# Patient Record
Sex: MALE | Race: WHITE | NOT HISPANIC OR LATINO | Employment: OTHER | URBAN - METROPOLITAN AREA
[De-identification: names, ages, dates, MRNs, and addresses within clinical notes are randomized per-mention and may not be internally consistent; named-entity substitution may affect disease eponyms.]

---

## 2017-01-04 DIAGNOSIS — E78.5 HYPERLIPIDEMIA: ICD-10-CM

## 2017-01-05 RX ORDER — ATORVASTATIN CALCIUM 10 MG/1
TABLET, FILM COATED ORAL
Qty: 90 TABLET | Refills: 0 | Status: SHIPPED | OUTPATIENT
Start: 2017-01-05 | End: 2017-04-02 | Stop reason: SDUPTHER

## 2017-01-19 ENCOUNTER — LAB VISIT (OUTPATIENT)
Dept: LAB | Facility: HOSPITAL | Age: 62
End: 2017-01-19
Attending: FAMILY MEDICINE
Payer: COMMERCIAL

## 2017-01-19 DIAGNOSIS — Z51.81 ENCOUNTER FOR MONITORING STATIN THERAPY: ICD-10-CM

## 2017-01-19 DIAGNOSIS — E78.5 HYPERLIPIDEMIA: ICD-10-CM

## 2017-01-19 DIAGNOSIS — Z79.899 ENCOUNTER FOR MONITORING STATIN THERAPY: ICD-10-CM

## 2017-01-19 LAB
ALBUMIN SERPL BCP-MCNC: 4 G/DL
ALP SERPL-CCNC: 72 U/L
ALT SERPL W/O P-5'-P-CCNC: 31 U/L
ANION GAP SERPL CALC-SCNC: 12 MMOL/L
AST SERPL-CCNC: 24 U/L
BILIRUB SERPL-MCNC: 0.5 MG/DL
BUN SERPL-MCNC: 15 MG/DL
CALCIUM SERPL-MCNC: 10.3 MG/DL
CHLORIDE SERPL-SCNC: 99 MMOL/L
CHOLEST/HDLC SERPL: 2.4 {RATIO}
CO2 SERPL-SCNC: 25 MMOL/L
CREAT SERPL-MCNC: 0.9 MG/DL
EST. GFR  (AFRICAN AMERICAN): >60 ML/MIN/1.73 M^2
EST. GFR  (NON AFRICAN AMERICAN): >60 ML/MIN/1.73 M^2
GLUCOSE SERPL-MCNC: 95 MG/DL
HDL/CHOLESTEROL RATIO: 41 %
HDLC SERPL-MCNC: 183 MG/DL
HDLC SERPL-MCNC: 75 MG/DL
LDLC SERPL CALC-MCNC: 89.4 MG/DL
NONHDLC SERPL-MCNC: 108 MG/DL
POTASSIUM SERPL-SCNC: 4.4 MMOL/L
PROT SERPL-MCNC: 7.3 G/DL
SODIUM SERPL-SCNC: 136 MMOL/L
TRIGL SERPL-MCNC: 93 MG/DL

## 2017-01-19 PROCEDURE — 80053 COMPREHEN METABOLIC PANEL: CPT

## 2017-01-19 PROCEDURE — 80061 LIPID PANEL: CPT

## 2017-01-19 PROCEDURE — 36415 COLL VENOUS BLD VENIPUNCTURE: CPT | Mod: PO

## 2017-03-21 ENCOUNTER — TELEPHONE (OUTPATIENT)
Dept: FAMILY MEDICINE | Facility: CLINIC | Age: 62
End: 2017-03-21

## 2017-03-21 NOTE — TELEPHONE ENCOUNTER
That statement was made to me by him and I can't change it.  However, if they look at the details in the 5/16/14 note they will see the date he stated that [place] recorded.

## 2017-03-21 NOTE — TELEPHONE ENCOUNTER
Patient quit smoking 5/8/2014 at 4:30 am. Life insurance denied due to statement in ov notes from 6/29/15. Patient wants this changed and taken out of his record or record stating when he quit.

## 2017-03-22 NOTE — TELEPHONE ENCOUNTER
Patient notified documentation found in chart stating what day he stopped smoking-mailed copy to him.

## 2017-04-02 DIAGNOSIS — E78.5 HYPERLIPIDEMIA: ICD-10-CM

## 2017-04-02 RX ORDER — ATORVASTATIN CALCIUM 10 MG/1
TABLET, FILM COATED ORAL
Qty: 90 TABLET | Refills: 2 | Status: SHIPPED | OUTPATIENT
Start: 2017-04-02 | End: 2017-12-26 | Stop reason: SDUPTHER

## 2017-04-03 DIAGNOSIS — E78.5 HYPERLIPIDEMIA: ICD-10-CM

## 2017-04-03 RX ORDER — ATORVASTATIN CALCIUM 10 MG/1
TABLET, FILM COATED ORAL
Qty: 90 TABLET | Refills: 0 | OUTPATIENT
Start: 2017-04-03

## 2017-04-11 DIAGNOSIS — E78.5 HYPERLIPIDEMIA: ICD-10-CM

## 2017-04-11 RX ORDER — ATORVASTATIN CALCIUM 10 MG/1
TABLET, FILM COATED ORAL
Qty: 90 TABLET | Refills: 2 | OUTPATIENT
Start: 2017-04-11

## 2017-05-02 ENCOUNTER — OFFICE VISIT (OUTPATIENT)
Dept: DERMATOLOGY | Facility: CLINIC | Age: 62
End: 2017-05-02
Payer: COMMERCIAL

## 2017-05-02 VITALS — WEIGHT: 270 LBS | HEIGHT: 69 IN | BODY MASS INDEX: 39.99 KG/M2

## 2017-05-02 DIAGNOSIS — L57.0 ACTINIC KERATOSES: ICD-10-CM

## 2017-05-02 DIAGNOSIS — L82.0 INFLAMED SEBORRHEIC KERATOSIS: ICD-10-CM

## 2017-05-02 DIAGNOSIS — D22.9 MULTIPLE BENIGN NEVI: ICD-10-CM

## 2017-05-02 DIAGNOSIS — L82.1 SEBORRHEIC KERATOSES: ICD-10-CM

## 2017-05-02 DIAGNOSIS — L81.4 SOLAR LENTIGO: ICD-10-CM

## 2017-05-02 DIAGNOSIS — Z85.820 HISTORY OF MELANOMA: Primary | ICD-10-CM

## 2017-05-02 PROCEDURE — 1160F RVW MEDS BY RX/DR IN RCRD: CPT | Mod: S$GLB,,, | Performed by: DERMATOLOGY

## 2017-05-02 PROCEDURE — 17000 DESTRUCT PREMALG LESION: CPT | Mod: 59,S$GLB,, | Performed by: DERMATOLOGY

## 2017-05-02 PROCEDURE — 99214 OFFICE O/P EST MOD 30 MIN: CPT | Mod: 25,S$GLB,, | Performed by: DERMATOLOGY

## 2017-05-02 PROCEDURE — 99999 PR PBB SHADOW E&M-EST. PATIENT-LVL II: CPT | Mod: PBBFAC,,, | Performed by: DERMATOLOGY

## 2017-05-02 PROCEDURE — 17003 DESTRUCT PREMALG LES 2-14: CPT | Mod: S$GLB,,, | Performed by: DERMATOLOGY

## 2017-05-02 PROCEDURE — 11307 SHAVE SKIN LESION 1.1-2.0 CM: CPT | Mod: S$GLB,,, | Performed by: DERMATOLOGY

## 2017-05-02 NOTE — PATIENT INSTRUCTIONS
Shave Wound Care    Your doctor has performed a shave today.  A band aid and vaseline ointment has been placed over the site.  This should remain in place for 24 hours.  It is recommended that you keep the area dry for the first 24 hours.  After 24 hours, you may remove the band aid and wash the area with warm soap and water and apply Vaseline jelly.  Many patients prefer to use Neosporin or Bacitracin ointment.  This is acceptable; however, know that you can develop an allergy to this medication even if you have used it safely for years.  It is important to keep the area moist.  Letting it dry out and get air slows healing time, and will worsen the scar.  Band aid is optional after first 24 hours.      If you notice increasing redness, tenderness, pain, or yellow drainage at the biopsy site, please notify your doctor.  These are signs of an infection.    If your biopsy site is bleeding, apply firm pressure for 15 minutes straight.  Repeat for another 15 minutes, if it is still bleeding.   If the surgical site continues to bleed, then please contact your doctor.       [facility]  [facility]  [address]  [place] [address]  Dept: [phone]     CRYOSURGERY      Your doctor has used a method called cryosurgery to treat your skin condition. Cryosurgery refers to the use of very cold substances to treat a variety of skin conditions such as warts, pre-skin cancers, molluscum contagiosum, sun spots, and several benign growths. The substance we use in cryosurgery is liquid nitrogen and is so cold (-195 degrees Celsius) that is burns when administered.     Following treatment in the office, the skin may immediately burn and become red. You may find the area around the lesion is affected as well. It is sometimes necessary to treat not only the lesion, but a small area of the surrounding normal skin to achieve a good response.     A blister, and even a blood filled blister, may form after  treatment.   This is a normal response. If the blister is painful, it is acceptable to sterilize a needle and with rubbing alcohol and gently pop the blister. It is important that you gently wash the area with soap and warm water as the blister fluid may contain wart virus if a wart was treated. Do no remove the roof of the blister.     The area treated can take anywhere from 1-3 weeks to heal. Healing time depends on the kind skin lesion treated, the location, and how aggressively the lesion was treated. It is recommended that the areas treated are covered with Vaseline or bacitracin ointment and a band-aid. If a band-aid is not practical, just ointment applied several times per day will do. Keeping these areas moist will speed the healing time.    Treatment with liquid nitrogen can leave a scar. In dark skin, it may be a light or dark scar, in light skin it may be a white or pink scar. These will generally fade with time, but may never go away completely.     If you have any concerns after your treatment, please feel free to call the office.         [facility]  [facility]  [address] [address]  Dept: [phone]

## 2017-05-02 NOTE — PROGRESS NOTES
Subjective:       Patient ID:  Mal Larkin is a 61 y.o. male who presents for   Chief Complaint   Patient presents with    Skin Check     TBSE    Spot     scalp     HPI Comments: Initial visit  Previously under the care of Dr Novoa, last seen 12/2015  H/o MIS upper back 2011  This is a high risk patient here to check for the development of new lesions.        Spot  - Initial  Affected locations: scalp  Duration: 3 years  Signs / symptoms: irritated  Severity: mild  Timing: constant  Aggravated by: pressure  Relieving factors/Treatments tried: nothing        Review of Systems   Constitutional: Negative for fever, chills, weight loss, weight gain, fatigue, night sweats and malaise.   Skin: Positive for wears hat. Negative for daily sunscreen use, activity-related sunscreen use and recent sunburn.   Hematologic/Lymphatic: Bruises/bleeds easily.        Objective:    Physical Exam   Constitutional: He appears well-developed and well-nourished. He is obese.  No distress.   Neurological: He is alert and oriented to person, place, and time. He is not disoriented.   Psychiatric: He has a normal mood and affect.   Skin:   Areas Examined (abnormalities noted in diagram):   Scalp / Hair Palpated and Inspected  Head / Face Inspection Performed  Neck Inspection Performed  Chest / Axilla Inspection Performed  Abdomen Inspection Performed  Genitals / Buttocks / Groin Inspection Performed  Back Inspection Performed  RUE Inspected  LUE Inspection Performed  RLE Inspected  LLE Inspection Performed  Nails and Digits Inspection Performed                       Diagram Legend     Erythematous scaling macule/papule c/w actinic keratosis       Vascular papule c/w angioma      Pigmented verrucoid papule/plaque c/w seborrheic keratosis      Yellow umbilicated papule c/w sebaceous hyperplasia      Irregularly shaped tan macule c/w lentigo     1-2 mm smooth white papules consistent with Milia      Movable subcutaneous cyst with  punctum c/w epidermal inclusion cyst      Subcutaneous movable cyst c/w pilar cyst      Firm pink to brown papule c/w dermatofibroma      Pedunculated fleshy papule(s) c/w skin tag(s)      Evenly pigmented macule c/w junctional nevus     Mildly variegated pigmented, slightly irregular-bordered macule c/w mildly atypical nevus      Flesh colored to evenly pigmented papule c/w intradermal nevus       Pink pearly papule/plaque c/w basal cell carcinoma      Erythematous hyperkeratotic cursted plaque c/w SCC      Surgical scar with no sign of skin cancer recurrence      Open and closed comedones      Inflammatory papules and pustules      Verrucoid papule consistent consistent with wart     Erythematous eczematous patches and plaques     Dystrophic onycholytic nail with subungual debris c/w onychomycosis     Umbilicated papule    Erythematous-base heme-crusted tan verrucoid plaque consistent with inflamed seborrheic keratosis     Erythematous Silvery Scaling Plaque c/w Psoriasis     See annotation      Assessment / Plan:        History of melanoma  Area of previous melanoma (upper back) examined. Site well healed with no signs of recurrence.  Total body skin examination performed today including at least 12 points as noted in physical examination. No lesions suspicious for malignancy noted.    Inflamed seborrheic keratosis, R ant scalp, irritated injured by hard hat, requests treatment  Verbal consent obtained. 1 lesions (1.5 x 1cm) removed with shallow shave removal after anesthesia with 1% lidocaine with epinephrine. Hemostasis achieved with aluminum chloride and hyfrecation. No complications.    Seborrheic keratoses  These are benign inherited growths without a malignant potential. Reassurance given to patient. No treatment is necessary.     Actinic keratoses  Cryosurgery Procedure Note    Verbal consent from the patient is obtained and the patient is aware of the precancerous quality and need for treatment of these  lesions. Liquid nitrogen cryosurgery is applied to the 6 actinic keratoses, as detailed in the physical exam, to produce a freeze injury. The patient is aware that blisters may form and is instructed on wound care with gentle cleansing and use of vaseline ointment to keep moist until healed. The patient is supplied a handout on cryosurgery and is instructed to call if lesions do not completely resolve.    Solar lentigo  This is a benign hyperpigmented sun induced lesion. Daily sun protection will reduce the number of new lesions. Treatment of these benign lesions are considered cosmetic.    Multiple benign nevi  Discussed ABCDE's of nevi.  Monitor for new mole or moles that are becoming bigger, darker, irritated, or developing irregular borders. Brochure provided.    Patient instructed in importance in daily sun protection of at least spf 30. Sun avoidance and topical protection discussed.   Recommend Elta MD (physician office) COTZ sensitive (available online) for daily use on face and neck.  Patient encouraged to wear hat for all outdoor exposure.   Also discussed sun protective clothing.           Return in about 1 year (around 5/2/2018).

## 2017-06-29 ENCOUNTER — OFFICE VISIT (OUTPATIENT)
Dept: FAMILY MEDICINE | Facility: CLINIC | Age: 62
End: 2017-06-29
Payer: COMMERCIAL

## 2017-06-29 ENCOUNTER — TELEPHONE (OUTPATIENT)
Dept: FAMILY MEDICINE | Facility: CLINIC | Age: 62
End: 2017-06-29

## 2017-06-29 ENCOUNTER — DOCUMENTATION ONLY (OUTPATIENT)
Dept: FAMILY MEDICINE | Facility: CLINIC | Age: 62
End: 2017-06-29

## 2017-06-29 ENCOUNTER — HOSPITAL ENCOUNTER (OUTPATIENT)
Dept: RADIOLOGY | Facility: CLINIC | Age: 62
Discharge: HOME | End: 2017-06-29
Attending: FAMILY MEDICINE
Payer: COMMERCIAL

## 2017-06-29 VITALS
HEART RATE: 102 BPM | BODY MASS INDEX: 39.74 KG/M2 | TEMPERATURE: 99 F | WEIGHT: 268.31 LBS | HEIGHT: 69 IN | SYSTOLIC BLOOD PRESSURE: 135 MMHG | DIASTOLIC BLOOD PRESSURE: 82 MMHG

## 2017-06-29 DIAGNOSIS — M47.26 OSTEOARTHRITIS OF SPINE WITH RADICULOPATHY, LUMBAR REGION: ICD-10-CM

## 2017-06-29 DIAGNOSIS — M47.26 OSTEOARTHRITIS OF SPINE WITH RADICULOPATHY, LUMBAR REGION: Primary | ICD-10-CM

## 2017-06-29 PROCEDURE — 99213 OFFICE O/P EST LOW 20 MIN: CPT | Mod: S$GLB,,, | Performed by: PHYSICIAN ASSISTANT

## 2017-06-29 PROCEDURE — 99999 PR PBB SHADOW E&M-EST. PATIENT-LVL III: CPT | Mod: PBBFAC,,, | Performed by: PHYSICIAN ASSISTANT

## 2017-06-29 PROCEDURE — 72110 X-RAY EXAM L-2 SPINE 4/>VWS: CPT | Mod: TC,PO

## 2017-06-29 PROCEDURE — 72110 X-RAY EXAM L-2 SPINE 4/>VWS: CPT | Mod: 26,,, | Performed by: RADIOLOGY

## 2017-06-29 NOTE — PROGRESS NOTES
Pre-Visit Chart Review  For Appointment Scheduled on 06/29/2017      There are no preventive care reminders to display for this patient.

## 2017-06-29 NOTE — PROGRESS NOTES
Subjective:       Patient ID: Mal Larkin is a 61 y.o. male.    Chief Complaint: Back Pain    RESULTS:  Lumbar spine: AP, lateral  views, with coned down lateral view of the lumbosacral junction.     Comparison: 10/10/11.  MRI 06/29/11     Findings:     Again demonstrated is grade II anterolisthesis of L5 on S1, by 2 cm, on the basis of spondylolysis.  The degree of anterolisthesis is similar to that seen on 2011 exam.  There is advanced disk degenerative disease at this level with marked loss of   intervertebral disk space height and endplate sclerosis.  Mild to moderate degenerative changes elsewhere in the upper lumbar region is noted with spurring of the vertebral bodies and mild decrease in intervertebral disk space height.   Slight chronic   anterior wedging of T12.  No acute fracture.  Minor left convex curvature of the lumbar spine is noted as well as post operative findings from posterior decompression L4 to L5.  Sacroiliac joints are intact.  IMPRESSION:      Grade II anterolisthesis of L5 on S1 due to spondylolysis with mild to moderate lumbar spondylosis elsewhere and post operative findings from posterior lower lumbar decompression.           Back Pain   This is a chronic problem. The current episode started more than 1 year ago. The problem occurs constantly. The problem has been gradually worsening (patient in getting to the pain where it is difficult to walk, and is unable to work or pass a physical. ) since onset. The pain is present in the sacro-iliac and lumbar spine. The quality of the pain is described as aching and shooting. The pain is at a severity of 10/10. The pain is severe. The pain is the same all the time. The symptoms are aggravated by bending, lying down, standing and sitting. Associated symptoms include leg pain, numbness, paresthesias, pelvic pain and weakness. Pertinent negatives include no abdominal pain, bladder incontinence, bowel incontinence, chest pain, dysuria, fever,  headaches, perianal numbness, tingling or weight loss. Risk factors include lack of exercise and obesity (chronic severe back issues. ). He has tried NSAIDs for the symptoms. The treatment provided no relief.     Review of Systems   Constitutional: Negative for activity change, appetite change, chills, fatigue, fever, unexpected weight change and weight loss.   HENT: Negative.    Eyes: Negative for photophobia, pain, discharge, redness and visual disturbance.   Respiratory: Negative for cough, chest tightness and shortness of breath.    Cardiovascular: Negative for chest pain, palpitations and leg swelling.   Gastrointestinal: Negative for abdominal distention, abdominal pain, blood in stool, bowel incontinence, constipation, diarrhea, nausea and vomiting.   Genitourinary: Positive for pelvic pain. Negative for bladder incontinence, decreased urine volume, difficulty urinating, dysuria, frequency, hematuria and urgency.   Musculoskeletal: Positive for arthralgias, back pain, gait problem and myalgias.   Neurological: Positive for weakness, numbness and paresthesias. Negative for dizziness, tingling, light-headedness and headaches.       Objective:      Physical Exam   Constitutional: He is oriented to person, place, and time. He appears well-developed and well-nourished. No distress.   HENT:   Head: Normocephalic and atraumatic.   Right Ear: External ear normal.   Left Ear: External ear normal.   Mouth/Throat: No oropharyngeal exudate.   Eyes: Conjunctivae and EOM are normal. Pupils are equal, round, and reactive to light. Right eye exhibits no discharge. Left eye exhibits no discharge.   Neck: Normal range of motion. Neck supple. No thyromegaly present.   Cardiovascular: Normal rate, regular rhythm and normal heart sounds.  Exam reveals no gallop and no friction rub.    No murmur heard.  Pulmonary/Chest: Effort normal and breath sounds normal. No respiratory distress. He has no wheezes. He has no rales. He exhibits  no tenderness.   Abdominal: Soft. Bowel sounds are normal. He exhibits no distension and no mass. There is no tenderness. There is no guarding.   Musculoskeletal:        Lumbar back: He exhibits decreased range of motion, tenderness, bony tenderness and pain. He exhibits no swelling, no edema, no deformity, no laceration and no spasm.   Lymphadenopathy:     He has no cervical adenopathy.   Neurological: He is alert and oriented to person, place, and time.   Skin: Skin is warm and dry. He is not diaphoretic.   Psychiatric: He has a normal mood and affect. His behavior is normal. Judgment and thought content normal.       Assessment:       1. Osteoarthritis of spine with radiculopathy, lumbar region        Plan:       Mal was seen today for back pain.    Diagnoses and all orders for this visit:    Osteoarthritis of spine with radiculopathy, lumbar region  -     X-Ray Lumbar Spine Complete 5 View; Future    Will refer to pain management or neurosurgery

## 2017-06-30 ENCOUNTER — DOCUMENTATION ONLY (OUTPATIENT)
Dept: FAMILY MEDICINE | Facility: CLINIC | Age: 62
End: 2017-06-30

## 2017-06-30 ENCOUNTER — TELEPHONE (OUTPATIENT)
Dept: FAMILY MEDICINE | Facility: CLINIC | Age: 62
End: 2017-06-30

## 2017-06-30 DIAGNOSIS — M47.26 OSTEOARTHRITIS OF SPINE WITH RADICULOPATHY, LUMBAR REGION: Primary | ICD-10-CM

## 2017-06-30 DIAGNOSIS — G89.29 CHRONIC MIDLINE LOW BACK PAIN WITH BILATERAL SCIATICA: Primary | ICD-10-CM

## 2017-06-30 DIAGNOSIS — M54.42 CHRONIC MIDLINE LOW BACK PAIN WITH BILATERAL SCIATICA: Primary | ICD-10-CM

## 2017-06-30 DIAGNOSIS — M54.41 CHRONIC MIDLINE LOW BACK PAIN WITH BILATERAL SCIATICA: Primary | ICD-10-CM

## 2017-06-30 NOTE — TELEPHONE ENCOUNTER
----- Message from JEANNIE Causey sent at 6/29/2017  2:47 PM CDT -----  Back xray unchanged from 2013, lets refer to Dr. Tamayo

## 2017-06-30 NOTE — PROGRESS NOTES
Pre-Visit Chart Review  For Appointment Scheduled on 8-3-17    There are no preventive care reminders to display for this patient.

## 2017-06-30 NOTE — TELEPHONE ENCOUNTER
Patient notified of xray result and has been scheduled to see Dr. Tamayo on 7/17/17. Please review pended referral and will link to appointment.

## 2017-07-17 ENCOUNTER — OFFICE VISIT (OUTPATIENT)
Dept: PAIN MEDICINE | Facility: CLINIC | Age: 62
End: 2017-07-17
Payer: COMMERCIAL

## 2017-07-17 VITALS
BODY MASS INDEX: 39.69 KG/M2 | SYSTOLIC BLOOD PRESSURE: 150 MMHG | HEIGHT: 69 IN | WEIGHT: 268 LBS | HEART RATE: 69 BPM | DIASTOLIC BLOOD PRESSURE: 94 MMHG

## 2017-07-17 DIAGNOSIS — M47.819 FACET ARTHROPATHY: ICD-10-CM

## 2017-07-17 DIAGNOSIS — M51.36 DDD (DEGENERATIVE DISC DISEASE), LUMBAR: Primary | ICD-10-CM

## 2017-07-17 DIAGNOSIS — M96.1 POSTLAMINECTOMY SYNDROME OF LUMBAR REGION: ICD-10-CM

## 2017-07-17 DIAGNOSIS — M43.10 SPONDYLOLISTHESIS, UNSPECIFIED SPINAL REGION: ICD-10-CM

## 2017-07-17 PROCEDURE — 99244 OFF/OP CNSLTJ NEW/EST MOD 40: CPT | Mod: S$GLB,,, | Performed by: ANESTHESIOLOGY

## 2017-07-17 PROCEDURE — 99999 PR PBB SHADOW E&M-EST. PATIENT-LVL II: CPT | Mod: PBBFAC,,, | Performed by: ANESTHESIOLOGY

## 2017-07-17 NOTE — PROGRESS NOTES
This note was completed with dictation software and grammatical errors may exist.    Referring Physician: Renee Vazquez PA    PCP: Tito Marino MD      CC: back and bilateral leg pain    HPI:   Mal Larkin is a 61 y.o. male referred to us for lower back and bilateral leg pain.  Pain has been present for over 30 years. No recent traumatic incident. He has history of 2 prior lumbar spinesurgeries.  Most recently, he had a L5 laminectomy by Dr. Bryant.  He is known history with a stable grade 2 spondylolisthesis at L5 on S1.he continues have constant aching, burning, deep pain in his lower back.  Lower back pain is more bothersome.  He has occasional sharp shooting pain going down his bilateral legs into his bilateral toes. He has numbness and tingling in his toes. Pain worsens with standing, bending, walking, getting up.  Pain improves with sitting down.  His tried physical therapy with minimal benefit. He has not any lumbar spine injections. E denies any weakness.  No bowel bladder changes.  He rates his pain 6/10 today, 10/10 at worse.    ROS:  CONSTITUTIONAL: No fevers, chills, night sweats, wt. loss, appetite changes  SKIN: no rashes or itching  ENT: No headaches, head trauma, vision changes, or eye pain  LYMPH NODES: None noticed   CV: No chest pain, palpitations.   RESP: No shortness of breath, dyspnea on exertion, cough, wheezing, or hemoptysis  GI: No nausea, emesis, diarrhea, constipation, melena, hematochezia, pain.    : No dysuria, hematuria, urgency, or frequency   HEME: No easy bruising, bleeding problems  PSYCHIATRIC: No depression, anxiety, psychosis, hallucinations.  NEURO: No seizures, memory loss, dizziness or difficulty sleeping  MSK: + history of present illness      Past Medical History:   Diagnosis Date    Adenomatous colon polyp     + dysplasia    Colon polyp     Fatty liver     Hypertension     Melanoma 2011    in situ - upper back     Past Surgical History:   Procedure  Laterality Date    APPENDECTOMY      BACK SURGERY      transverse process fracture    COLONOSCOPY  11/11/2013    Dr. Hayes, 3 year recheck    COLONOSCOPY N/A 12/20/2016    Procedure: COLONOSCOPY;  Surgeon: Atilio Hayes MD;  Location[facility];  Service: Endoscopy;  Laterality: N/A;    HAND SURGERY      right dupuytrens release on 8/5/13    LUMBAR LAMINECTOMY  2011    melanoma removal  2011    WISDOM TOOTH EXTRACTION       Family History   Problem Relation Age of Onset    Skin cancer Mother     Ovarian cancer Mother     Heart disease Mother     Prostate cancer Father     Colon cancer Father     Cancer Father      glands    Melanoma Father     Melanoma Sister     Asthma Daughter     Fibromyalgia Daughter     Heart disease Maternal Uncle     Alcohol abuse Maternal Uncle     Cancer Paternal Aunt     Cerebral aneurysm Maternal Grandmother     Early death Maternal Grandmother      fall hit head    Heart disease Maternal Grandfather     Cancer Paternal Grandmother     Pancreatic cancer Paternal Grandmother     Cancer Sister      cervix, abd     Melanoma Sister     Skin cancer Sister     Vaginal cancer Sister     Macular degeneration Neg Hx     Retinal detachment Neg Hx     Glaucoma Neg Hx     Psoriasis Neg Hx     Lupus Neg Hx     Eczema Neg Hx      Social History     Social History    Marital status:      Spouse name: N/A    Number of children: N/A    Years of education: N/A     Occupational History     [employer]     Social History Main Topics    Smoking status: Former Smoker     Packs/day: 1.00     Years: 30.00     Types: Cigarettes     Quit date: 5/8/2014    Smokeless tobacco: Former User     Types: Chew     Quit date: 9/22/1979    Alcohol use 6.0 oz/week     12 Standard drinks or equivalent per week      Comment: Drinks beer only and mostly during football season    Drug use: No    Sexual activity: Not Asked     Other Topics Concern    None  "    Social History Narrative    None         Medications/Allergies: See med card    Vitals:    07/17/17 0801   BP: (!) 150/94   Pulse: 69   Weight: 121.6 kg (268 lb)   Height: 5' 9" (1.753 m)   PainSc:   8   PainLoc: Back         Physical exam:    GENERAL: A and O x3, the patient appears well groomed and is in no acute distress.  Skin: No rashes or obvious lesions  HEENT: normocephalic, atraumatic  CARDIOVASCULAR:  Palpable peripheral pulses  LUNGS: easy work of breathing  ABDOMEN: soft, nontender   UPPER EXTREMITIES: Normal alignment, normal range of motion, no atrophy, no skin changes,  hair growth and nail growth normal and equal bilaterally. No swelling, no tenderness.    LOWER EXTREMITIES:  Normal alignment, normal range of motion, no atrophy, no skin changes,  hair growth and nail growth normal and equal bilaterally. No swelling, no tenderness.    LUMBAR SPINE  Lumbar spine: ROM is limited with flexion extension and oblique extension with moderate increased pain.    Micheal's test causes no increased pain on either side.    Supine straight leg raise is positive on right at 60 degrees   Internal and external rotation of the hip causes no increased pain on either side.  Myofascial exam: No tenderness to palpation across lumbar paraspinous muscles.      MENTAL STATUS: normal orientation, speech, language, and fund of knowledge for social situation.  Emotional state appropriate.    CRANIAL NERVES:  II:  PERRL bilaterally,   III,IV,VI: EOMI.    V:  Facial sensation equal bilaterally  VII:  Facial motor function normal.  VIII:  Hearing equal to finger rub bilaterally  IX/X: Gag normal, palate symmetric  XI:  Shoulder shrug equal, head turn equal  XII:  Tongue midline without fasciculations      MOTOR: Tone and bulk: normal bilateral upper and lower Strength: normal   Delt Bi Tri WE WF     R 5 5 5 5 5 5   L 5 5 5 5 5 5     IP ADD ABD Quad TA Gas HAM  R 5 5 5 5 5 5 5  L 5 5 5 5 5 5 5    SENSATION: Light touch and " pinprick intact bilaterally  REFLEXES: normal, symmetric, nonbrisk.  Toes down, no clonus. No hoffmans.  GAIT: normal rise, base, steps, and arm swing.        Imaging:  MRI L-spine 6/2011  IMPRESSION:   1.  DEGENERATION AND MILD CENTRAL EXTRUSION OF THE T12-L1 DISC.  2.  SEVERE SPINAL STENOSIS AT THE L1-2 LEVEL FROM A COMBINATION OF DISC   EXTRUSION AND DEGENERATIVE FACET ARTHROSIS.    3.  SEVERE SPINAL STENOSIS AT THE L2-3 LEVEL FROM A COMBINATION OF DISC   BULGE AND DEGENERATIVE FACET ARTHROSIS.    4.  MILD SPINAL STENOSIS AT THE L3-4 LEVEL FROM A COMBINATION OF DISC   BULGE AND DEGENERATIVE FACET ARTHROSIS.  5.  MINIMAL L4-5 DISC BULGE.  THERE IS MILD LEFT L4-5 FACET JOINT   DEGENERATIVE ARTHROSIS.    6.  BILATERAL L5 SPONDYLOLYSIS WITH GRADE II SPONDYLOLISTHESIS OF L5 ON   S1.  THERE IS SEVERE DEGENERATION OF THE L5-S1 DISC WITH MILD DISC BULGE   AND SEVERE BILATERAL FORAMINAL STENOSIS.  THERE ARE ALSO POSTOPERATIVE   CHANGES OF L5 LAMINECTOMY.      Xray L-spine 6/29/17  1.  Stable radiographic appearance of the lumbar spine demonstrating chronic grade 2 spondylolisthesis of L5 on S1 secondary to bilateral spondylolysis and additional multilevel spondylosis. Postoperative laminectomy changes are also noted at L4 and L5. Severe osseous neural foraminal narrowing at L5-S1 is also again noted.    Assessment:  Patient referred for lower back and bilateral leg pain  1. DDD (degenerative disc disease), lumbar    2. Postlaminectomy syndrome of lumbar region    3. Spondylolisthesis, unspecified spinal region    4. Facet arthropathy          Plan:  - I have stressed the importance of physical activity and exercise to improve overall health  - I believe his low back pain maybe due to facet arthropathy and have recommended lumbar medial branch blocks as a diagnostic procedure.  If successful, would proceed with radiofrequency ablation.  - May consider lumbar ELOY if radicular pain worsens  - Follow up after  procedure      Thank you for referring this interesting patient, and I look forward to continuing to collaborate in his care.

## 2017-07-24 DIAGNOSIS — M47.816 FACET ARTHROPATHY, LUMBAR: Primary | ICD-10-CM

## 2017-08-01 ENCOUNTER — PATIENT MESSAGE (OUTPATIENT)
Dept: ADMINISTRATIVE | Facility: OTHER | Age: 62
End: 2017-08-01

## 2017-08-03 ENCOUNTER — SURGERY (OUTPATIENT)
Age: 62
End: 2017-08-03

## 2017-08-03 ENCOUNTER — HOSPITAL ENCOUNTER (OUTPATIENT)
Facility: AMBULARY SURGERY CENTER | Age: 62
Discharge: HOME | End: 2017-08-03
Attending: ANESTHESIOLOGY | Admitting: ANESTHESIOLOGY
Payer: COMMERCIAL

## 2017-08-03 DIAGNOSIS — M47.819 FACET ARTHROPATHY: Primary | ICD-10-CM

## 2017-08-03 PROCEDURE — 64493 INJ PARAVERT F JNT L/S 1 LEV: CPT | Mod: LT | Performed by: ANESTHESIOLOGY

## 2017-08-03 PROCEDURE — 64495 INJ PARAVERT F JNT L/S 3 LEV: CPT | Mod: LT | Performed by: ANESTHESIOLOGY

## 2017-08-03 PROCEDURE — 64495 INJ PARAVERT F JNT L/S 3 LEV: CPT | Mod: 50,,, | Performed by: ANESTHESIOLOGY

## 2017-08-03 PROCEDURE — 64493 INJ PARAVERT F JNT L/S 1 LEV: CPT | Mod: 50,,, | Performed by: ANESTHESIOLOGY

## 2017-08-03 PROCEDURE — 64494 INJ PARAVERT F JNT L/S 2 LEV: CPT | Mod: RT | Performed by: ANESTHESIOLOGY

## 2017-08-03 PROCEDURE — 99152 MOD SED SAME PHYS/QHP 5/>YRS: CPT | Mod: ,,, | Performed by: ANESTHESIOLOGY

## 2017-08-03 PROCEDURE — 64494 INJ PARAVERT F JNT L/S 2 LEV: CPT | Mod: 50,,, | Performed by: ANESTHESIOLOGY

## 2017-08-03 RX ORDER — FENTANYL CITRATE 50 UG/ML
INJECTION, SOLUTION INTRAMUSCULAR; INTRAVENOUS
Status: DISCONTINUED
Start: 2017-08-03 | End: 2017-08-03 | Stop reason: WASHOUT

## 2017-08-03 RX ORDER — BUPIVACAINE HYDROCHLORIDE 5 MG/ML
INJECTION, SOLUTION EPIDURAL; INTRACAUDAL
Status: DISCONTINUED | OUTPATIENT
Start: 2017-08-03 | End: 2017-08-03 | Stop reason: HOSPADM

## 2017-08-03 RX ORDER — MIDAZOLAM HYDROCHLORIDE 1 MG/ML
INJECTION INTRAMUSCULAR; INTRAVENOUS
Status: DISCONTINUED
Start: 2017-08-03 | End: 2017-08-03 | Stop reason: HOSPADM

## 2017-08-03 RX ORDER — SODIUM CHLORIDE, SODIUM LACTATE, POTASSIUM CHLORIDE, CALCIUM CHLORIDE 600; 310; 30; 20 MG/100ML; MG/100ML; MG/100ML; MG/100ML
INJECTION, SOLUTION INTRAVENOUS ONCE AS NEEDED
Status: COMPLETED | OUTPATIENT
Start: 2017-08-03 | End: 2017-08-03

## 2017-08-03 RX ORDER — BUPIVACAINE HYDROCHLORIDE 5 MG/ML
INJECTION, SOLUTION EPIDURAL; INTRACAUDAL
Status: DISCONTINUED
Start: 2017-08-03 | End: 2017-08-03 | Stop reason: HOSPADM

## 2017-08-03 RX ORDER — MIDAZOLAM HYDROCHLORIDE 2 MG/2ML
INJECTION, SOLUTION INTRAMUSCULAR; INTRAVENOUS
Status: DISCONTINUED | OUTPATIENT
Start: 2017-08-03 | End: 2017-08-03 | Stop reason: HOSPADM

## 2017-08-03 RX ORDER — ALPRAZOLAM 1 MG/1
1 TABLET, ORALLY DISINTEGRATING ORAL
Status: DISCONTINUED | OUTPATIENT
Start: 2017-08-03 | End: 2017-08-03 | Stop reason: HOSPADM

## 2017-08-03 RX ADMIN — MIDAZOLAM HYDROCHLORIDE 2 MG: 2 INJECTION, SOLUTION INTRAMUSCULAR; INTRAVENOUS at 10:08

## 2017-08-03 RX ADMIN — BUPIVACAINE HYDROCHLORIDE 5 ML: 5 INJECTION, SOLUTION EPIDURAL; INTRACAUDAL at 10:08

## 2017-08-03 RX ADMIN — SODIUM CHLORIDE, SODIUM LACTATE, POTASSIUM CHLORIDE, CALCIUM CHLORIDE: 600; 310; 30; 20 INJECTION, SOLUTION INTRAVENOUS at 10:08

## 2017-08-03 NOTE — H&P (VIEW-ONLY)
This note was completed with dictation software and grammatical errors may exist.    Referring Physician: Renee Vazquez PA    PCP: Tito Marino MD      CC: back and bilateral leg pain    HPI:   Mal Larkin is a 61 y.o. male referred to us for lower back and bilateral leg pain.  Pain has been present for over 30 years. No recent traumatic incident. He has history of 2 prior lumbar spinesurgeries.  Most recently, he had a L5 laminectomy by Dr. Bryant.  He is known history with a stable grade 2 spondylolisthesis at L5 on S1.he continues have constant aching, burning, deep pain in his lower back.  Lower back pain is more bothersome.  He has occasional sharp shooting pain going down his bilateral legs into his bilateral toes. He has numbness and tingling in his toes. Pain worsens with standing, bending, walking, getting up.  Pain improves with sitting down.  His tried physical therapy with minimal benefit. He has not any lumbar spine injections. E denies any weakness.  No bowel bladder changes.  He rates his pain 6/10 today, 10/10 at worse.    ROS:  CONSTITUTIONAL: No fevers, chills, night sweats, wt. loss, appetite changes  SKIN: no rashes or itching  ENT: No headaches, head trauma, vision changes, or eye pain  LYMPH NODES: None noticed   CV: No chest pain, palpitations.   RESP: No shortness of breath, dyspnea on exertion, cough, wheezing, or hemoptysis  GI: No nausea, emesis, diarrhea, constipation, melena, hematochezia, pain.    : No dysuria, hematuria, urgency, or frequency   HEME: No easy bruising, bleeding problems  PSYCHIATRIC: No depression, anxiety, psychosis, hallucinations.  NEURO: No seizures, memory loss, dizziness or difficulty sleeping  MSK: + history of present illness      Past Medical History:   Diagnosis Date    Adenomatous colon polyp     + dysplasia    Colon polyp     Fatty liver     Hypertension     Melanoma 2011    in situ - upper back     Past Surgical History:   Procedure  Laterality Date    APPENDECTOMY      BACK SURGERY      transverse process fracture    COLONOSCOPY  11/11/2013    Dr. Hayes, 3 year recheck    COLONOSCOPY N/A 12/20/2016    Procedure: COLONOSCOPY;  Surgeon: Atilio Hayes MD;  Location: [facility];  Service: Endoscopy;  Laterality: N/A;    HAND SURGERY      right dupuytrens release on 8/5/13    LUMBAR LAMINECTOMY  2011    melanoma removal  2011    WISDOM TOOTH EXTRACTION       Family History   Problem Relation Age of Onset    Skin cancer Mother     Ovarian cancer Mother     Heart disease Mother     Prostate cancer Father     Colon cancer Father     Cancer Father      glands    Melanoma Father     Melanoma Sister     Asthma Daughter     Fibromyalgia Daughter     Heart disease Maternal Uncle     Alcohol abuse Maternal Uncle     Cancer Paternal Aunt     Cerebral aneurysm Maternal Grandmother     Early death Maternal Grandmother      fall hit head    Heart disease Maternal Grandfather     Cancer Paternal Grandmother     Pancreatic cancer Paternal Grandmother     Cancer Sister      cervix, abd     Melanoma Sister     Skin cancer Sister     Vaginal cancer Sister     Macular degeneration Neg Hx     Retinal detachment Neg Hx     Glaucoma Neg Hx     Psoriasis Neg Hx     Lupus Neg Hx     Eczema Neg Hx      Social History     Social History    Marital status:      Spouse name: N/A    Number of children: N/A    Years of education: N/A     Occupational History     [employer]     Social History Main Topics    Smoking status: Former Smoker     Packs/day: 1.00     Years: 30.00     Types: Cigarettes     Quit date: 5/8/2014    Smokeless tobacco: Former User     Types: Chew     Quit date: 9/22/1979    Alcohol use 6.0 oz/week     12 Standard drinks or equivalent per week      Comment: Drinks beer only and mostly during football season    Drug use: No    Sexual activity: Not Asked     Other Topics Concern    None  "    Social History Narrative    None         Medications/Allergies: See med card    Vitals:    07/17/17 0801   BP: (!) 150/94   Pulse: 69   Weight: 121.6 kg (268 lb)   Height: 5' 9" (1.753 m)   PainSc:   8   PainLoc: Back         Physical exam:    GENERAL: A and O x3, the patient appears well groomed and is in no acute distress.  Skin: No rashes or obvious lesions  HEENT: normocephalic, atraumatic  CARDIOVASCULAR:  Palpable peripheral pulses  LUNGS: easy work of breathing  ABDOMEN: soft, nontender   UPPER EXTREMITIES: Normal alignment, normal range of motion, no atrophy, no skin changes,  hair growth and nail growth normal and equal bilaterally. No swelling, no tenderness.    LOWER EXTREMITIES:  Normal alignment, normal range of motion, no atrophy, no skin changes,  hair growth and nail growth normal and equal bilaterally. No swelling, no tenderness.    LUMBAR SPINE  Lumbar spine: ROM is limited with flexion extension and oblique extension with moderate increased pain.    Micheal's test causes no increased pain on either side.    Supine straight leg raise is positive on right at 60 degrees   Internal and external rotation of the hip causes no increased pain on either side.  Myofascial exam: No tenderness to palpation across lumbar paraspinous muscles.      MENTAL STATUS: normal orientation, speech, language, and fund of knowledge for social situation.  Emotional state appropriate.    CRANIAL NERVES:  II:  PERRL bilaterally,   III,IV,VI: EOMI.    V:  Facial sensation equal bilaterally  VII:  Facial motor function normal.  VIII:  Hearing equal to finger rub bilaterally  IX/X: Gag normal, palate symmetric  XI:  Shoulder shrug equal, head turn equal  XII:  Tongue midline without fasciculations      MOTOR: Tone and bulk: normal bilateral upper and lower Strength: normal   Delt Bi Tri WE WF     R 5 5 5 5 5 5   L 5 5 5 5 5 5     IP ADD ABD Quad TA Gas HAM  R 5 5 5 5 5 5 5  L 5 5 5 5 5 5 5    SENSATION: Light touch and " pinprick intact bilaterally  REFLEXES: normal, symmetric, nonbrisk.  Toes down, no clonus. No hoffmans.  GAIT: normal rise, base, steps, and arm swing.        Imaging:  MRI L-spine 6/2011  IMPRESSION:   1.  DEGENERATION AND MILD CENTRAL EXTRUSION OF THE T12-L1 DISC.  2.  SEVERE SPINAL STENOSIS AT THE L1-2 LEVEL FROM A COMBINATION OF DISC   EXTRUSION AND DEGENERATIVE FACET ARTHROSIS.    3.  SEVERE SPINAL STENOSIS AT THE L2-3 LEVEL FROM A COMBINATION OF DISC   BULGE AND DEGENERATIVE FACET ARTHROSIS.    4.  MILD SPINAL STENOSIS AT THE L3-4 LEVEL FROM A COMBINATION OF DISC   BULGE AND DEGENERATIVE FACET ARTHROSIS.  5.  MINIMAL L4-5 DISC BULGE.  THERE IS MILD LEFT L4-5 FACET JOINT   DEGENERATIVE ARTHROSIS.    6.  BILATERAL L5 SPONDYLOLYSIS WITH GRADE II SPONDYLOLISTHESIS OF L5 ON   S1.  THERE IS SEVERE DEGENERATION OF THE L5-S1 DISC WITH MILD DISC BULGE   AND SEVERE BILATERAL FORAMINAL STENOSIS.  THERE ARE ALSO POSTOPERATIVE   CHANGES OF L5 LAMINECTOMY.      Xray L-spine 6/29/17  1.  Stable radiographic appearance of the lumbar spine demonstrating chronic grade 2 spondylolisthesis of L5 on S1 secondary to bilateral spondylolysis and additional multilevel spondylosis. Postoperative laminectomy changes are also noted at L4 and L5. Severe osseous neural foraminal narrowing at L5-S1 is also again noted.    Assessment:  Patient referred for lower back and bilateral leg pain  1. DDD (degenerative disc disease), lumbar    2. Postlaminectomy syndrome of lumbar region    3. Spondylolisthesis, unspecified spinal region    4. Facet arthropathy          Plan:  - I have stressed the importance of physical activity and exercise to improve overall health  - I believe his low back pain maybe due to facet arthropathy and have recommended lumbar medial branch blocks as a diagnostic procedure.  If successful, would proceed with radiofrequency ablation.  - May consider lumbar ELOY if radicular pain worsens  - Follow up after  procedure      Thank you for referring this interesting patient, and I look forward to continuing to collaborate in his care.

## 2017-08-03 NOTE — INTERVAL H&P NOTE
The patient has been examined and the H&P has been reviewed:    I concur with the findings and no changes have occurred since H&P was written.   This patient has been cleared for surgery in an ambulatory surgical facility    ASA 3,  MP3  No history of anesthetic complications  Plan for RN IV sedation      Anesthesia/Surgery risks, benefits and alternative options discussed and understood by patient/family.          There are no hospital problems to display for this patient.

## 2017-08-03 NOTE — DISCHARGE SUMMARY
[facility]  Discharge Note  Short Stay    Admit Date: 8/3/2017    Discharge Date and Time: 8/3/2017    Attending Physician: Javier Tamayo MD     Discharge Provider: Javier Tamayo    Diagnoses:  Active Hospital Problems    Diagnosis  POA    *Facet arthropathy [M12.88]  Yes      Resolved Hospital Problems    Diagnosis Date Resolved POA   No resolved problems to display.       Hospital Course: Lumbar MBB   Discharged Condition: Good    Final Diagnoses:   Active Hospital Problems    Diagnosis  POA    *Facet arthropathy [M12.88]  Yes      Resolved Hospital Problems    Diagnosis Date Resolved POA   No resolved problems to display.       Disposition: Home or Self Care    Follow up/Patient Instructions:    Medications:  Reconciled Home Medications:   Current Discharge Medication List      CONTINUE these medications which have NOT CHANGED    Details   amlodipine (NORVASC) 5 MG tablet TAKE 1 TABLET BY MOUTH ONCE DAILY  Qty: 90 tablet, Refills: 3    Associated Diagnoses: Essential hypertension      atorvastatin (LIPITOR) 10 MG tablet TAKE 1 TABLET BY MOUTH DAILY  Qty: 90 tablet, Refills: 2    Associated Diagnoses: Hyperlipidemia      lisinopril (PRINIVIL,ZESTRIL) 20 MG tablet TAKE 1 TABLET BY MOUTH DAILY  Qty: 90 tablet, Refills: 3    Associated Diagnoses: Essential hypertension             Discharge Procedure Orders  Diet general     Activity as tolerated     Call MD for:  temperature >100.4     Call MD for:  persistent nausea and vomiting or diarrhea     Call MD for:  severe uncontrolled pain     Call MD for:  redness, tenderness, or signs of infection (pain, swelling, redness, odor or green/yellow discharge around incision site)     Call MD for:  difficulty breathing or increased cough     Call MD for:  severe persistent headache          Follow up with MD in 2-3 weeks    Discharge Procedure Orders (must include Diet, Follow-up, Activity):    Discharge Procedure Orders (must include Diet, Follow-up, Activity)  Diet  general     Activity as tolerated     Call MD for:  temperature >100.4     Call MD for:  persistent nausea and vomiting or diarrhea     Call MD for:  severe uncontrolled pain     Call MD for:  redness, tenderness, or signs of infection (pain, swelling, redness, odor or green/yellow discharge around incision site)     Call MD for:  difficulty breathing or increased cough     Call MD for:  severe persistent headache

## 2017-08-03 NOTE — OP NOTE
PROCEDURE DATE: 8/3/2017    PROCEDURE:  Bilateral L2,3,4,5 medial branch nerve blocks    DIAGNOSIS:  Lumbar spondylosis without myelopathy    Post Op diagnosis: Same    PHYSICIAN: Javier Tamayo MD    MEDICATIONS INJECTED: 0.5% bupivicaine, 0.5 ml at each level    LOCAL ANESTHETIC USED: None    SEDATION MEDICATIONS:IV Versed    ESTIMATED BLOOD LOSS:  None    COMPLICATIONS:  None    TECHNIQUE: A time out was taken to identify the patient, procedure and side of the procedure. The patient was placed in a prone position, then prepped and draped in the usual sterile fashion using ChloraPrep and sterile towels.  The levels were determined under fluoroscopic guidance and then marked.  A 25-gauge 3.5 inch needle was introduced to the anatomic location of the L2,3,4,5 medial branch nerves on the bilateral side. The above medication was then injected. The patient tolerated the procedure well.     The patient was monitored after the procedure. Patient was given pain diary to record pain levels at home.     If found to have greater than a 50% recovery and so will be scheduled for a radiofrequency ablation of the corresponding nerves.  Patient was given post procedure and discharge instructions to follow at home.  The patient was discharged in a stable condition.

## 2017-08-04 VITALS
DIASTOLIC BLOOD PRESSURE: 62 MMHG | HEIGHT: 69 IN | HEART RATE: 68 BPM | RESPIRATION RATE: 18 BRPM | SYSTOLIC BLOOD PRESSURE: 116 MMHG | WEIGHT: 268 LBS | BODY MASS INDEX: 39.69 KG/M2 | OXYGEN SATURATION: 92 % | TEMPERATURE: 98 F

## 2017-08-07 ENCOUNTER — TELEPHONE (OUTPATIENT)
Dept: PAIN MEDICINE | Facility: CLINIC | Age: 62
End: 2017-08-07

## 2017-08-07 NOTE — TELEPHONE ENCOUNTER
Patient reports 50% or greater decrease in pain. Will proceed with RF to corresponding nerves. Patient scheduled for 8/22/17. Instructions given patient voiced understanding.

## 2017-08-07 NOTE — TELEPHONE ENCOUNTER
----- Message from Abi Perez sent at 8/7/2017  1:31 PM CDT -----  Contact: self  Patient calling regarding the nerve burn appointment. Patient is needing to know it is schedule for. Please call patient at [phone]. Thanks!

## 2017-08-09 ENCOUNTER — PATIENT MESSAGE (OUTPATIENT)
Dept: ADMINISTRATIVE | Facility: OTHER | Age: 62
End: 2017-08-09

## 2017-08-15 DIAGNOSIS — M47.816 LUMBAR FACET ARTHROPATHY: Primary | ICD-10-CM

## 2017-08-17 ENCOUNTER — PATIENT OUTREACH (OUTPATIENT)
Dept: OTHER | Facility: OTHER | Age: 62
End: 2017-08-17

## 2017-08-17 NOTE — TELEPHONE ENCOUNTER
HTN Digital Medicine Program Medication Reconciliation Outreach    Called patient to introduce him into the HDMP. Reviewed program details including blood pressure goals, technique for taking readings (timing, cuff placement, body positioning, [facility] eugenio), and what to do in case of emergency. Introduced patient to members of care team (health , clinician, and responsible provider). Verified patient's understanding of [facility] eugenio use for contacting clinical team and to ensure that iHealth cuff readings continue to transmit by logging in once every 2 weeks. Confirmation text sent and patient received.  Pt has been taking readings at various times of day. States that he wakes up around 3am and takes his Norvasc at that time. Norvasc was decreased to 5mg d/t swelling in ankles and feet, but BP has been much higher since that change was made and would like to be back on 10mg daily. Is concerned about swelling and feels that he should be on a diuretic as well.  Initially had some issues with BP cuff and was notified that he was not placing the cuff on tight enough. Has not been having any issues since this was adjusted.  Pt has pain management issues and is set to undergo a nerve burn to help with symptoms. Not currently on any pain medications as outpatient.    Screening Questionnaire Review:  1. Depression - not indicated  2. Sleep apnea - yes     VIOLET screening results for this patient suggest a high likelihood of sleep apnea, which can contribute to hypertension. Patient has not been previously diagnosed with sleep apnea and is not interested in referral at this time.  Pt told by gastroenterologist that he was snoring heavily under anesthesia when he had colonoscopy done last. Knows that he needs to lose weight as this issue did not start until he gained weight following quitting smoking.  Will speak with PCP at his next appointment in September.    Patient reports the following symptoms of sleep apnea:  snoring.    Verified the following information with the patient:  1. Medication list  Current Outpatient Prescriptions on File Prior to Visit   Medication Sig Dispense Refill    amlodipine (NORVASC) 5 MG tablet TAKE 1 TABLET BY MOUTH ONCE DAILY 90 tablet 3    atorvastatin (LIPITOR) 10 MG tablet TAKE 1 TABLET BY MOUTH DAILY 90 tablet 2    lisinopril (PRINIVIL,ZESTRIL) 20 MG tablet TAKE 1 TABLET BY MOUTH DAILY 90 tablet 3     No current facility-administered medications on file prior to visit.        Previous ADRs  NKDA    2. Medication compliance: has been compliant with the medicaiton regimen    3. Medication Allergies: Review of patient's allergies indicates:  No Known Allergies    Explained that our goal is to get his BP consistently below 140/90mmHg.  Patient denies having further questions, concerns. BP is at goal.       Last 5 Patient Entered [employer] Current 30 Day Average: 135/85     Recent Readings 8/17/2017 8/17/2017 8/16/2017 8/16/2017 8/16/2017    Systolic BP (mmHg) 131 132 115 140 144    Diastolic BP (mmHg) 82 90 76 82 93    Pulse 65 66 68 70 70

## 2017-08-21 ENCOUNTER — PATIENT OUTREACH (OUTPATIENT)
Dept: OTHER | Facility: OTHER | Age: 62
End: 2017-08-21

## 2017-08-21 DIAGNOSIS — I10 ESSENTIAL HYPERTENSION: ICD-10-CM

## 2017-08-21 NOTE — LETTER
Sophia Ogden, Rayna  [address] [place] [address]     Dear Mal Larkin,    Welcome to the [facility]!         My name is Sophia Ogden PharmD and I am your dedicated Digital Medicine clinician.  As an expert in medication management, I will help ensure that the medications you are taking continue to provide you with the intended benefits.      I am Gregoria Lobato and I will be your health  for the duration of the program.  My  job is to help you identify lifestyle changes to improve your blood pressure control.  We will talk about nutrition, exercise, and other ways that you may be able to adjust your current habits to better your health. Together, we will work to improve your overall health and encourage you to meet your goals for a healthier lifestyle.    What we expect from YOU:    You will need to take blood pressure readings multiple times a week and no less than one reading per week.   It is important that you take your measurements at different times during the day, when possible.     What you should expect from your Digital Medicine Care Team:   We will provide you with education about high blood pressure, including lifestyle changes that could help you to control your blood pressure.   We will review your weekly readings and provide you with monthly blood pressure progress reports after you have been in the program for more than 30 days.   We will send monthly progress reports on your blood pressure control to your physician so they can follow along with your progress as well.    You will be able to reach me by phone at [phone] or through your [facility] account by clicking my name under Care Team on the right side of the home screen.    I look forward to working with you to achieve your blood pressure goals!    Sincerely,    Sophia Ogden PharmD  Your personal clinician    Please visit [email or web address]  to learn more about high blood pressure and what you can do lower your blood pressure.                                                                                           Mal Larkin  [address] [place] [address] [address] Carlos QUIROS [id number]

## 2017-08-21 NOTE — PROGRESS NOTES
"Last 5 Patient Entered [employer] Current 30 Day Average: 137/85     Recent Readings 8/22/2017 8/22/2017 8/21/2017 8/21/2017 8/21/2017    Systolic BP (mmHg) 146 152 130 109 132    Diastolic BP (mmHg) 90 92 79 77 92    Pulse 67 70 99 103 68        Called Mr. Larkin to introduce him into the Hypertension Digital Medicine Program.     Mr. Larkin informed his health  that he has ADAM from amlodipine. He says he started having swelling of his feet and ankles "the day he started taking amlodipine." He is asking about a diuretic to counteract the swelling. Explained that we have room to increase lisinopril to 40 mg QD and stop the amlodipine, instead of adding a new medication. He said that he asked Dr. Marino about this and he informed him that the higher dose of lisinopril will cause ED. Explained to Mr. Larkin that may not necessarily happen, but if we increase it and he does experience ED or any other side effect, we can resume the 20 mg dose and add another medication, other than amlodipine. He is willing to try the lisinopril 40 mg QD to see if this will control BP and stop swelling. He will inform me of any side effects that occur, otherwise I will check in with him in about 2 weeks.     Reviewed patient's medications and verified allergies on file.   Hypertension Medications                  lisinopril (PRINIVIL,ZESTRIL) 20 MG tablet TAKE 2 TABLETS BY MOUTH DAILY        Explained that we expect him to obtain several blood pressures/week at random times of day. Also asked that the BP be taken at least 1 hour after taking BP medications.     Explained that our goal is to get his BP to consistently below 140/90mmHg.     Patient and I agreed that the patient will take his BP daily to every other day at varying times of the day.     Emailed patient link to [facility] webpage as well as my direct phone number in case he has in questions.         "

## 2017-08-21 NOTE — PROGRESS NOTES
[facility] Average: 137/86     Recent Readings 8/21/2017 8/20/2017 8/20/2017 8/19/2017 8/19/2017    Systolic BP (mmHg) 132 141 146 154 134    Diastolic BP (mmHg) 92 93 93 86 86    Pulse 68 78 81 67 66            Hypertension Digital Medicine Program (HDMP): Health  Introduction    Introduced Mr. Mal Larkin to [facility]. Discussed health  role and recommended lifestyle modifications.    Diet (i.e. Low sodium, food labels):  Patient says, he only eats one meal a day that his wife cooks. Patient expressed he that he does not add any salt to foods and watches what he eats.  Patient is aware of how much sodium he needs to intake on a daily bases. He mentioned, he eats sausage once a month and drinks a diet soft drink twice a week.    Exercise:  Patient mention he can not exercise d/t being in pain from the malignant melanoma of the skin on the upper back that is blocking a nerve. Patient states, the pain  makes it harder for him to stand up and cook on his own.    I will follow up with the patient on exercise once he gets well soon from his surgery he is having tomorrow on his back. The patient says,he hopes this surgery will help him and he can move around better.    Alcohol/Tobacco: Patient does not smoke or drink.    Medication Adherence: has been compliant with the medicaiton regimen    Patient mentioned feet and ankles were swollen. Patient says, the physician decreased the Norvasc to 5mg d/t feet and ankle swelling and BP has been reading high lately. He would like to go back to 10mg daily and thinks he should be on diuretics. I will put in a task for his PharmD Sophia Ogden to see what is going on with the medication. Patient is also having surgery tomorrow on his back to relieve pain    Reviewed AHA recommendations:  Limit sodium intake to <2000mg/day  Perform 150 minutes of physical activity per week    Patient is aware of the importance of taking daily BP readings  at various times of the day  Patient aware that the health  can be used as a resource for lifestyle modifications to help reduce or maintain a healthy BP  Patient is aware of the importance of medication adherence.  [facility]. Patient should call 911 or [facility] if one arises.

## 2017-08-22 ENCOUNTER — SURGERY (OUTPATIENT)
Age: 62
End: 2017-08-22

## 2017-08-22 ENCOUNTER — HOSPITAL ENCOUNTER (OUTPATIENT)
Facility: AMBULARY SURGERY CENTER | Age: 62
Discharge: HOME | End: 2017-08-22
Attending: ANESTHESIOLOGY | Admitting: ANESTHESIOLOGY
Payer: COMMERCIAL

## 2017-08-22 DIAGNOSIS — M47.819 FACET ARTHROPATHY: Primary | ICD-10-CM

## 2017-08-22 PROCEDURE — 99152 MOD SED SAME PHYS/QHP 5/>YRS: CPT | Mod: ,,, | Performed by: ANESTHESIOLOGY

## 2017-08-22 PROCEDURE — 64636 DESTROY L/S FACET JNT ADDL: CPT | Mod: RT | Performed by: ANESTHESIOLOGY

## 2017-08-22 PROCEDURE — 64635 DESTROY LUMB/SAC FACET JNT: CPT | Mod: LT | Performed by: ANESTHESIOLOGY

## 2017-08-22 PROCEDURE — 64635 DESTROY LUMB/SAC FACET JNT: CPT | Mod: 50,,, | Performed by: ANESTHESIOLOGY

## 2017-08-22 PROCEDURE — 64636 DESTROY L/S FACET JNT ADDL: CPT | Mod: 50,,, | Performed by: ANESTHESIOLOGY

## 2017-08-22 RX ORDER — LIDOCAINE HYDROCHLORIDE 10 MG/ML
INJECTION, SOLUTION EPIDURAL; INFILTRATION; INTRACAUDAL; PERINEURAL
Status: DISCONTINUED | OUTPATIENT
Start: 2017-08-22 | End: 2017-08-22 | Stop reason: HOSPADM

## 2017-08-22 RX ORDER — FENTANYL CITRATE 50 UG/ML
INJECTION, SOLUTION INTRAMUSCULAR; INTRAVENOUS
Status: DISCONTINUED
Start: 2017-08-22 | End: 2017-08-22 | Stop reason: HOSPADM

## 2017-08-22 RX ORDER — MIDAZOLAM HYDROCHLORIDE 2 MG/2ML
INJECTION, SOLUTION INTRAMUSCULAR; INTRAVENOUS
Status: DISCONTINUED | OUTPATIENT
Start: 2017-08-22 | End: 2017-08-22 | Stop reason: HOSPADM

## 2017-08-22 RX ORDER — SODIUM CHLORIDE, SODIUM LACTATE, POTASSIUM CHLORIDE, CALCIUM CHLORIDE 600; 310; 30; 20 MG/100ML; MG/100ML; MG/100ML; MG/100ML
INJECTION, SOLUTION INTRAVENOUS ONCE AS NEEDED
Status: COMPLETED | OUTPATIENT
Start: 2017-08-22 | End: 2017-08-22

## 2017-08-22 RX ORDER — LIDOCAINE HYDROCHLORIDE 20 MG/ML
INJECTION, SOLUTION EPIDURAL; INFILTRATION; INTRACAUDAL; PERINEURAL
Status: DISCONTINUED
Start: 2017-08-22 | End: 2017-08-22 | Stop reason: HOSPADM

## 2017-08-22 RX ORDER — MIDAZOLAM HYDROCHLORIDE 1 MG/ML
INJECTION INTRAMUSCULAR; INTRAVENOUS
Status: DISCONTINUED
Start: 2017-08-22 | End: 2017-08-22 | Stop reason: HOSPADM

## 2017-08-22 RX ORDER — LISINOPRIL 20 MG/1
40 TABLET ORAL DAILY
Qty: 90 TABLET | Refills: 3
Start: 2017-08-22 | End: 2017-09-27 | Stop reason: SDUPTHER

## 2017-08-22 RX ORDER — FENTANYL CITRATE 50 UG/ML
INJECTION, SOLUTION INTRAMUSCULAR; INTRAVENOUS
Status: DISCONTINUED | OUTPATIENT
Start: 2017-08-22 | End: 2017-08-22 | Stop reason: HOSPADM

## 2017-08-22 RX ORDER — DEXAMETHASONE SODIUM PHOSPHATE 10 MG/ML
INJECTION INTRAMUSCULAR; INTRAVENOUS
Status: DISCONTINUED
Start: 2017-08-22 | End: 2017-08-22 | Stop reason: HOSPADM

## 2017-08-22 RX ORDER — DEXAMETHASONE SODIUM PHOSPHATE 10 MG/ML
INJECTION INTRAMUSCULAR; INTRAVENOUS
Status: DISCONTINUED | OUTPATIENT
Start: 2017-08-22 | End: 2017-08-22 | Stop reason: HOSPADM

## 2017-08-22 RX ORDER — ALPRAZOLAM 1 MG/1
1 TABLET, ORALLY DISINTEGRATING ORAL
Status: DISCONTINUED | OUTPATIENT
Start: 2017-08-22 | End: 2017-08-22 | Stop reason: HOSPADM

## 2017-08-22 RX ORDER — LIDOCAINE HYDROCHLORIDE 10 MG/ML
INJECTION, SOLUTION EPIDURAL; INFILTRATION; INTRACAUDAL; PERINEURAL
Status: DISCONTINUED
Start: 2017-08-22 | End: 2017-08-22 | Stop reason: HOSPADM

## 2017-08-22 RX ORDER — LIDOCAINE HYDROCHLORIDE 20 MG/ML
INJECTION, SOLUTION EPIDURAL; INFILTRATION; INTRACAUDAL; PERINEURAL
Status: DISCONTINUED | OUTPATIENT
Start: 2017-08-22 | End: 2017-08-22 | Stop reason: HOSPADM

## 2017-08-22 RX ORDER — BUPIVACAINE HYDROCHLORIDE 2.5 MG/ML
INJECTION, SOLUTION EPIDURAL; INFILTRATION; INTRACAUDAL
Status: DISCONTINUED | OUTPATIENT
Start: 2017-08-22 | End: 2017-08-22 | Stop reason: HOSPADM

## 2017-08-22 RX ORDER — BUPIVACAINE HYDROCHLORIDE 2.5 MG/ML
INJECTION, SOLUTION EPIDURAL; INFILTRATION; INTRACAUDAL
Status: DISCONTINUED
Start: 2017-08-22 | End: 2017-08-22 | Stop reason: HOSPADM

## 2017-08-22 RX ADMIN — FENTANYL CITRATE 50 MCG: 50 INJECTION, SOLUTION INTRAMUSCULAR; INTRAVENOUS at 03:08

## 2017-08-22 RX ADMIN — LIDOCAINE HYDROCHLORIDE 6 ML: 20 INJECTION, SOLUTION EPIDURAL; INFILTRATION; INTRACAUDAL; PERINEURAL at 03:08

## 2017-08-22 RX ADMIN — LIDOCAINE HYDROCHLORIDE 10 ML: 10 INJECTION, SOLUTION EPIDURAL; INFILTRATION; INTRACAUDAL; PERINEURAL at 03:08

## 2017-08-22 RX ADMIN — BUPIVACAINE HYDROCHLORIDE 6 ML: 2.5 INJECTION, SOLUTION EPIDURAL; INFILTRATION; INTRACAUDAL at 03:08

## 2017-08-22 RX ADMIN — SODIUM CHLORIDE, SODIUM LACTATE, POTASSIUM CHLORIDE, CALCIUM CHLORIDE: 600; 310; 30; 20 INJECTION, SOLUTION INTRAVENOUS at 02:08

## 2017-08-22 RX ADMIN — MIDAZOLAM HYDROCHLORIDE 2 MG: 2 INJECTION, SOLUTION INTRAMUSCULAR; INTRAVENOUS at 03:08

## 2017-08-22 RX ADMIN — DEXAMETHASONE SODIUM PHOSPHATE 10 MG: 10 INJECTION INTRAMUSCULAR; INTRAVENOUS at 03:08

## 2017-08-22 NOTE — DISCHARGE SUMMARY
[facility]  Discharge Note  Short Stay    Admit Date: 8/22/2017    Discharge Date and Time: 8/22/2017    Attending Physician: Javier Tamayo MD     Discharge Provider: Javier Tamayo    Diagnoses:  Active Hospital Problems    Diagnosis  POA    *Facet arthropathy [M12.88]  Yes      Resolved Hospital Problems    Diagnosis Date Resolved POA   No resolved problems to display.       Hospital Course: Lumbar MB RFA  Discharged Condition: Good    Final Diagnoses:   Active Hospital Problems    Diagnosis  POA    *Facet arthropathy [M12.88]  Yes      Resolved Hospital Problems    Diagnosis Date Resolved POA   No resolved problems to display.       Disposition: Home or Self Care    Follow up/Patient Instructions:    Medications:  Reconciled Home Medications:   Current Discharge Medication List      CONTINUE these medications which have NOT CHANGED    Details   atorvastatin (LIPITOR) 10 MG tablet TAKE 1 TABLET BY MOUTH DAILY  Qty: 90 tablet, Refills: 2    Associated Diagnoses: Hyperlipidemia      lisinopril (PRINIVIL,ZESTRIL) 20 MG tablet Take 2 tablets (40 mg total) by mouth once daily.  Qty: 90 tablet, Refills: 3    Associated Diagnoses: Essential hypertension             Discharge Procedure Orders  Diet general     Activity as tolerated     Call MD for:  temperature >100.4     Call MD for:  persistent nausea and vomiting or diarrhea     Call MD for:  severe uncontrolled pain     Call MD for:  redness, tenderness, or signs of infection (pain, swelling, redness, odor or green/yellow discharge around incision site)     Call MD for:  difficulty breathing or increased cough     Call MD for:  severe persistent headache          Follow up with MD in 2-3 weeks    Discharge Procedure Orders (must include Diet, Follow-up, Activity):    Discharge Procedure Orders (must include Diet, Follow-up, Activity)  Diet general     Activity as tolerated     Call MD for:  temperature >100.4     Call MD for:  persistent nausea and vomiting or  diarrhea     Call MD for:  severe uncontrolled pain     Call MD for:  redness, tenderness, or signs of infection (pain, swelling, redness, odor or green/yellow discharge around incision site)     Call MD for:  difficulty breathing or increased cough     Call MD for:  severe persistent headache

## 2017-08-22 NOTE — PLAN OF CARE
Patient sitting in chair denies pain, nausea or leg weakness. Patient was able to lay supine, and raise each leg and hold each leg up for 30 seconds prior to getting out of bed without leg weakness.States he is ready to go home. Patient's wife chairside and states ready to to take patient  Home. Both patient and his spouse verbalize understanding of all discharge instructions. Patient to be discharged to car via ambulatory accompanied by nurse and his spouse who will be driving the patient home.

## 2017-08-22 NOTE — H&P
CC: low back pain    HPI: The patient is a 62 y.o. male with a history of facet arthropathy here for lumbar MB RFA. There are no major changes in history and physical from 7/17/17 by myself.    Past Medical History:   Diagnosis Date    Adenomatous colon polyp     + dysplasia    Colon polyp     Fatty liver     Hypertension     Melanoma 2011    in situ - upper back       Past Surgical History:   Procedure Laterality Date    APPENDECTOMY      BACK SURGERY      transverse process fracture    COLONOSCOPY  11/11/2013    Dr. Hayes, 3 year recheck    COLONOSCOPY N/A 12/20/2016    Procedure: COLONOSCOPY;  Surgeon: Atilio Hayes MD;  Location: [facility];  Service: Endoscopy;  Laterality: N/A;    HAND SURGERY      right dupuytrens release on 8/5/13    LUMBAR LAMINECTOMY  2011    melanoma removal  2011    WISDOM TOOTH EXTRACTION         Family History   Problem Relation Age of Onset    Skin cancer Mother     Ovarian cancer Mother     Heart disease Mother     Prostate cancer Father     Colon cancer Father     Cancer Father      glands    Melanoma Father     Melanoma Sister     Asthma Daughter     Fibromyalgia Daughter     Heart disease Maternal Uncle     Alcohol abuse Maternal Uncle     Cancer Paternal Aunt     Cerebral aneurysm Maternal Grandmother     Early death Maternal Grandmother      fall hit head    Heart disease Maternal Grandfather     Cancer Paternal Grandmother     Pancreatic cancer Paternal Grandmother     Cancer Sister      cervix, abd     Melanoma Sister     Skin cancer Sister     Vaginal cancer Sister     Macular degeneration Neg Hx     Retinal detachment Neg Hx     Glaucoma Neg Hx     Psoriasis Neg Hx     Lupus Neg Hx     Eczema Neg Hx        Social History     Social History    Marital status:      Spouse name: N/A    Number of children: N/A    Years of education: N/A     Occupational History     [employer]     Social History Main Topics  "   Smoking status: Former Smoker     Packs/day: 1.00     Years: 30.00     Types: Cigarettes     Quit date: 5/8/2014    Smokeless tobacco: Former User     Types: Chew     Quit date: 9/22/1979    Alcohol use 6.0 oz/week     12 Standard drinks or equivalent per week      Comment: Drinks beer only and mostly during football season    Drug use: No    Sexual activity: Not Asked     Other Topics Concern    None     Social History Narrative    None       Current Facility-Administered Medications   Medication Dose Route Frequency Provider Last Rate Last Dose    alprazolam dissolvable tablet 1 mg  1 mg Oral PRN Javier Tamayo MD        lactated ringers infusion   Intravenous Once PRN Javier Tamayo MD           Review of patient's allergies indicates:  No Known Allergies    Vitals:    08/21/17 0758   Weight: 122.5 kg (270 lb)   Height: 5' 9" (1.753 m)       REVIEW OF SYSTEMS:     GENERAL: No weight loss, malaise or fevers.  HEENT:  No recent changes in vision or hearing  NECK: Negative for lumps, no difficulty with swallowing.  RESPIRATORY: Negative for cough, wheezing or shortness of breath, patient denies any recent URI.  CARDIOVASCULAR: Negative for chest pain, leg swelling or palpitations.  GI: Negative for abdominal discomfort, blood in stools or black stools or change in bowel habits.  MUSCULOSKELETAL: See HPI.  SKIN: Negative for lesions, rash, and itching.  PSYCH: No suicidal or homicidal ideations, no current mood disturbances.  HEMATOLOGY/LYMPHOLOGY: Negative for prolonged bleeding, bruising easily or swollen nodes. Patient is not currently taking any anti-coagulants  ENDO: No history of diabetes or thyroid dysfunction  NEURO: No history of syncope, paralysis, seizures or tremors.All other reviewed and negative other than HPI.    Physical exam:  Gen: A and O x3, pleasant, well-groomed  Skin: No rashes or obvious lesions  HEENT: PERRLA, no obvious deformities on ears or in canals. No thyroid masses, trachea " midline, no palpable lymph nodes in neck, axilla.  CVS: Regular rate and rhythm, normal S1 and S2, no murmurs.  Resp: Clear to auscultation bilaterally.  Abdomen: Soft, NT/ND, normal bowel sounds present.  Musculoskeletal/Neuro: Moving all extremities    Assessment:  Facet arthropathy  -     Place in Outpatient; Standing  -     Vital signs; Standing  -     Activity as tolerated; Standing  -     Insert peripheral IV; Standing  -     Verify informed consent; Standing  -     Notify physician ; Standing  -     Notify physician ; Standing  -     Notify physician (specify); Standing  -     Diet NPO; Standing    Other orders  -     lactated ringers infusion; Inject into the vein once as needed (IV Sedation).  -     Low Risk of VTE; Standing  -     alprazolam dissolvable tablet 1 mg; Take 1 tablet (1 mg total) by mouth as needed for Anxiety.          PLAN: Lumbar MB RFA  This patient has been cleared for surgery in an ambulatory surgical facility    ASA 3,  MP3  No history of anesthetic complications  Plan for RN IV sedation

## 2017-08-22 NOTE — DISCHARGE INSTRUCTIONS
Procedural Sedation (Adult)  You have been given medicine by vein to make you sleep during your surgery. This may have included both a pain medicine and sleeping medicine. Most of the effects have worn off. But you may still have some drowsiness for the next 6 to 8 hours.  Home care  Follow these guidelines when you get home:  · For the next 8 hours, you should be watched by a responsible adult. This person should make sure your condition is not getting worse.  · Don't take any medicine by mouth for pain or for sleep during the next 4 hours. These might react with the medicines you were given in the hospital. This could cause a much stronger response than usual.  · Don't drink any alcohol for the next 24 hours or when taking narcotic pain medication.  · Don't drive, operate dangerous machinery, or make important business or personal decisions during the next 24 hours or when taking narcotic pain medication  Follow-up care  Follow up with your healthcare provider if you are not alert and back to your usual level of activity within 12 hours.  When to seek medical advice  Call your healthcare provider right away if any of these occur:  · Drowsiness gets worse  · Weakness or dizziness gets worse  · Repeated vomiting  · You cannot be awakened   Date Last Reviewed: 10/18/2016  © 0521-2555 [employer]. [address] [place] [address], [place] [id number]. All rights reserved. This information is not intended as a substitute for professional medical care. Always follow your healthcare professional's instructions.      RADIOFREQUENCY/Pain injection instructions:     Steroids take about a week to relieve pain.  Initially you may get pain relief from the local anesthetic but this may wear off before the steroid works.    No driving for 24 hrs   Activity as tolerated- gradually increase activities.  Dont lift over 10 lbs for 24 hrs   No heat at injection sites x 2 days. Do not use heating pads, hot tubs or swim for 2  days.  Use ice for mild swelling and for comfort.  May shower today. No  Tub baths for two days.      Resume Aspirin, Plavix, or Coumadin the day after the procedure unless otherwise instructed.   If diabetic,monitor your glucose carefully as steroids can increase glucose level    Seek immediate medical help for:   Severe increase in your usual pain or appearance of new pain.  Prolonged or increasing weakness or numbness in the legs or arms.    Numbing medicine was injected that affects nerves that carry information from your leg muscles to your brain and from your brain to your leg muscles.  This numbness can last 4-6 hrs so please get assistance when standing or walking.    Fever above 101 ,Drainage,redness,active bleeding, or increased swelling at the injection site.  Headache, shortness of breath, chest pain, or breathing problems.

## 2017-08-22 NOTE — OP NOTE
PROCEDURE DATE: 8/22/2017    PROCEDURE:  Radiofrequency ablation of the L2,3,4,5 medial branch nerves on the bilateral-side utilizing fluoroscopy    DIAGNOSIS:  Lumbar spondylosis without myelopathy  Post op Diagnosis: Same    PHYSICIAN: Javier Tamayo MD    MEDICATIONS INJECTED:  From a mixture of 6ml of 0.25% bupivicaine and 10mg of dexamethasone,  1ml of this solution was injected at each level.    LOCAL ANESTHETIC USED: Lidocaine 1%, 2 ml given at each site.    SEDATION MEDICATIONS: RN IV sedation    ESTIMATED BLOOD LOSS:  None    COMPLICATIONS:  None    TECHNIQUE:  A time out was taken to identify patient and procedure side prior to starting the procedure. Laying in a prone position, the patient was prepped and draped in the usual sterile fashion using ChloraPrep and sterile towels.  The levels were determined under fluoroscopic guidance and then marked.  Local anesthetic was given by raising a wheal at the skin over each site and then infiltrated approximately 2cm deeper.  A 20-gauge  100 mm RF needle was introduced to the anatomic location of the bilateral L2,3,4,5 medial branch nerves. Motor stimulation up to 2 Volts at each level confirmed no motor nerve involvement.  Impedance was less than 800 ohms at each level.  1ml of 2% lidocaine was instilled prior to lesioning.  Ablation was performed per level utilizing radiofrequency generator 80°C for 60 seconds.  Needles were then rotated 90° and a second lesion was performed.  The above noted medication was then injected slowly. The patient tolerated the procedure well.     The patient was monitored after the procedure.  Patient was given post procedure and discharge instructions to follow at home.  The patient was discharged in a stable condition

## 2017-08-25 VITALS
HEART RATE: 65 BPM | TEMPERATURE: 99 F | BODY MASS INDEX: 39.99 KG/M2 | RESPIRATION RATE: 16 BRPM | HEIGHT: 69 IN | OXYGEN SATURATION: 95 % | WEIGHT: 270 LBS | SYSTOLIC BLOOD PRESSURE: 115 MMHG | DIASTOLIC BLOOD PRESSURE: 67 MMHG

## 2017-09-07 ENCOUNTER — PATIENT OUTREACH (OUTPATIENT)
Dept: OTHER | Facility: OTHER | Age: 62
End: 2017-09-07

## 2017-09-07 DIAGNOSIS — I10 ESSENTIAL HYPERTENSION: Primary | ICD-10-CM

## 2017-09-07 RX ORDER — CHLORTHALIDONE 25 MG/1
TABLET ORAL
Qty: 30 TABLET | Refills: 11 | Status: SHIPPED | OUTPATIENT
Start: 2017-09-07 | End: 2017-09-15 | Stop reason: SDUPTHER

## 2017-09-07 NOTE — PROGRESS NOTES
Last 5 Patient Entered Redings Current 30 Day Average: 136/86     Recent Readings 9/7/2017 9/6/2017 9/5/2017 9/5/2017 9/5/2017    Systolic BP (mmHg) 136 144 144 151 145    Diastolic BP (mmHg) 89 97 91 99 95    Pulse 70 71 70 72 91        Mr. Saba' BP is at goal, but he is concerned that readings are still higher than they should be. He said when he was first diagnosed with HTN he was on lisinopril 20 mg QD and amlodipine 10 mg QD and his BP was in the 110s/70-80s. He did have swelling the entire time he was on amlodipine and says even now that he has stopped the amlodipine he is still experiencing ADAM. He says was on amlodipine for 6 years. Will start low dose chlorthalidone today and check BMP on 9/18.     Current HTN regimen:  Hypertension Medications             chlorthalidone (HYGROTEN) 25 MG Tab Take 1/2 tablet (12.5 mg) by mouth once daily.    lisinopril (PRINIVIL,ZESTRIL) 20 MG tablet Take 2 tablets (40 mg total) by mouth once daily.        Will continue to monitor regularly. Will follow up in 2-3 weeks, sooner if BP begins to trend upward or downward.    Patient has my contact information and knows to call with any concerns or clinical changes.

## 2017-09-11 ENCOUNTER — PATIENT OUTREACH (OUTPATIENT)
Dept: OTHER | Facility: OTHER | Age: 62
End: 2017-09-11

## 2017-09-11 NOTE — PROGRESS NOTES
Last 5 Patient Entered Redings Current 30 Day Average: 135/86     Recent Readings 9/11/2017 9/10/2017 9/9/2017 9/9/2017 9/8/2017    Systolic BP (mmHg) 138 125 133 144 128    Diastolic BP (mmHg) 89 88 85 91 84    Pulse 71 96 62 65 72          Hypertension Digital Medicine Program (HDMP): Health  Follow Up    Lifestyle Modifications:    1. Low sodium diet: yes    2.Physical activity: no     3.Hypotension/Hypertension symptoms: no   Frequency/Alleviating factors/Precipitating factors, etc.     4. Patient has been compliant with the medicaiton regimen    Follow up with Mr. Mal TEAGUE Yenniluther completed. No further questions or concerns. I will follow up in a few weeks to assess progress.     Patient mentioned he is doing well.  Patient stated he has cut out diet soft drinks and is drinking more water.  Patient mentioned he will try to start back making his own sausage to cut out the salt. Patient reports the swelling of his feet and ankles have gone down.  Patient mentioned he still has not been able to get any physical activity d/t him having surgery. Mr. Larkin mentioned he has to wait 6 weeks to see if his back is better to start physical activity.

## 2017-09-15 ENCOUNTER — LAB VISIT (OUTPATIENT)
Dept: LAB | Facility: HOSPITAL | Age: 62
End: 2017-09-15
Attending: FAMILY MEDICINE
Payer: COMMERCIAL

## 2017-09-15 ENCOUNTER — PATIENT OUTREACH (OUTPATIENT)
Dept: OTHER | Facility: OTHER | Age: 62
End: 2017-09-15

## 2017-09-15 DIAGNOSIS — I10 ESSENTIAL HYPERTENSION: ICD-10-CM

## 2017-09-15 DIAGNOSIS — E87.1 HYPONATREMIA: Primary | ICD-10-CM

## 2017-09-15 LAB
ANION GAP SERPL CALC-SCNC: 8 MMOL/L
BUN SERPL-MCNC: 9 MG/DL
CALCIUM SERPL-MCNC: 9.1 MG/DL
CHLORIDE SERPL-SCNC: 90 MMOL/L
CO2 SERPL-SCNC: 29 MMOL/L
CREAT SERPL-MCNC: 0.9 MG/DL
EST. GFR  (AFRICAN AMERICAN): >60 ML/MIN/1.73 M^2
EST. GFR  (NON AFRICAN AMERICAN): >60 ML/MIN/1.73 M^2
GLUCOSE SERPL-MCNC: 100 MG/DL
POTASSIUM SERPL-SCNC: 4.6 MMOL/L
SODIUM SERPL-SCNC: 127 MMOL/L

## 2017-09-15 PROCEDURE — 36415 COLL VENOUS BLD VENIPUNCTURE: CPT | Mod: PO

## 2017-09-15 PROCEDURE — 80048 BASIC METABOLIC PNL TOTAL CA: CPT

## 2017-09-15 RX ORDER — CHLORTHALIDONE 25 MG/1
TABLET ORAL
Qty: 30 TABLET | Refills: 11 | Status: SHIPPED | OUTPATIENT
Start: 2017-09-15 | End: 2017-09-27

## 2017-09-15 NOTE — PROGRESS NOTES
Last 5 Patient Entered Redings Current 30 Day Average: 134/86     Recent Readings 9/14/2017 9/14/2017 9/13/2017 9/12/2017 9/12/2017    Systolic BP (mmHg) 136 138 107 137 142    Diastolic BP (mmHg) 86 97 67 91 91    Pulse 68 68 83 77 79        Mr. Larkin's BP is improving on chlorthalidone 12.5 mg QD and his ADAM is resolving. He says his ankles are no longer swollen, but his feet do still have a little swelling. His Na on BMP today was 127. He drinks 2-4 L of fluids/daily. Asked that he restrict his fluid to 1.5-2L/day. Also reduced chlorthalidone to 1/4 tablet daily. Explained that if his Na does not return to normal, will discontinue chlorthalidone and start carvedilol. He will report to lab to repeat BMP on 9/21/17. Advised he seek emergency attention if he becomes lethargic, weak, or confused. He currently denies these symptoms and feels well. Will follow up once lab results are reviewed.     Current HTN regimen:  Hypertension Medications             chlorthalidone (HYGROTEN) 25 MG Tab Take 1/4 tablet (6.25 mg) by mouth once daily.    lisinopril (PRINIVIL,ZESTRIL) 20 MG tablet Take 2 tablets (40 mg total) by mouth once daily.          Patient has my contact information and knows to call with any concerns or clinical changes.

## 2017-09-16 ENCOUNTER — TELEPHONE (OUTPATIENT)
Dept: FAMILY MEDICINE | Facility: CLINIC | Age: 62
End: 2017-09-16

## 2017-09-16 NOTE — TELEPHONE ENCOUNTER
----- Message from Tito Marino MD sent at 9/15/2017  6:57 PM CDT -----  Sodium level is very low probably caused by his chlorthalidone/diuretic.  Needs to stop it for a few days and recheck the sodium level.  When the level comes back up we can try it again at a half dose but will have to continue to monitor it.  Order is in for sodium level one week after he stops the chlorthalidone.    Results sent by email

## 2017-09-16 NOTE — TELEPHONE ENCOUNTER
Patient was given report. Sophia Ogden pharmacist had advised him to go to 1/4 tab and recheck with BMP on Thursday 9-21-17.   Patient was given Dr Marino's report. He will stop the fluid pill and do lab on Saturday 9-23-17 sodium level. Bmp added that Ms Ogden ordered.

## 2017-09-21 ENCOUNTER — PATIENT OUTREACH (OUTPATIENT)
Dept: OTHER | Facility: OTHER | Age: 62
End: 2017-09-21

## 2017-09-21 NOTE — PROGRESS NOTES
"Last 5 Patient Entered Redings Current 30 Day Average: 133/86     Recent Readings 9/21/2017 9/21/2017 9/20/2017 9/18/2017 9/17/2017    Systolic BP (mmHg) 151 160 123 146 145    Diastolic BP (mmHg) 94 96 82 87 90    Pulse 69 72 65 81 66        Mr. Larkin's BP has started to increase since stopping chlorthalidone as directed by Dr. Marino. He will report to lab this Saturday per Dr. Marino's instructions. He says he has stopped monitoring his salt intake and has resumed his "normal" eating habits. He feels that since his Na was low on BMP, he should eat more salt through his diet. Explained that the drop in his sodium is due to the diuretic and the solution is to either reduce the dose or discontinue it, not to eat more high sodium foods. He has noticed swelling returning in his feet and ankles and his BP has started to increase. Explained this is likely due to stopping the diuretic and increasing salt intake. Explained that while he is not on a diuretic, he should definitely continue to limit his salt intake. I have messaged Dr. Marino for recommendation on resuming chlorthalidone 1/4 tablet QD if Na returns to normal levels or start HCTZ instead, but have not received a response yet. Will follow up with Mr. Larkin next week once labs are reviewed. Will also review any notes from Dr. Marino's office regarding instructions on resuming chlorthalidone.     Current HTN regimen:  Hypertension Medications             chlorthalidone (HYGROTEN) 25 MG Tab Take 1/4 tablet (6.25 mg) by mouth once daily.    lisinopril (PRINIVIL,ZESTRIL) 20 MG tablet Take 2 tablets (40 mg total) by mouth once daily.          Patient has my contact information and knows to call with any concerns or clinical changes.             "

## 2017-09-23 ENCOUNTER — LAB VISIT (OUTPATIENT)
Dept: LAB | Facility: HOSPITAL | Age: 62
End: 2017-09-23
Attending: FAMILY MEDICINE
Payer: COMMERCIAL

## 2017-09-23 DIAGNOSIS — I10 ESSENTIAL HYPERTENSION: ICD-10-CM

## 2017-09-23 DIAGNOSIS — E87.1 HYPONATREMIA: ICD-10-CM

## 2017-09-23 LAB
ANION GAP SERPL CALC-SCNC: 10 MMOL/L
BUN SERPL-MCNC: 11 MG/DL
CALCIUM SERPL-MCNC: 10.1 MG/DL
CHLORIDE SERPL-SCNC: 99 MMOL/L
CO2 SERPL-SCNC: 25 MMOL/L
CREAT SERPL-MCNC: 0.9 MG/DL
EST. GFR  (AFRICAN AMERICAN): >60 ML/MIN/1.73 M^2
EST. GFR  (NON AFRICAN AMERICAN): >60 ML/MIN/1.73 M^2
GLUCOSE SERPL-MCNC: 102 MG/DL
POTASSIUM SERPL-SCNC: 4.6 MMOL/L
SODIUM SERPL-SCNC: 134 MMOL/L
SODIUM SERPL-SCNC: 134 MMOL/L

## 2017-09-23 PROCEDURE — 80048 BASIC METABOLIC PNL TOTAL CA: CPT

## 2017-09-23 PROCEDURE — 36415 COLL VENOUS BLD VENIPUNCTURE: CPT | Mod: PO

## 2017-09-25 ENCOUNTER — PATIENT OUTREACH (OUTPATIENT)
Dept: OTHER | Facility: OTHER | Age: 62
End: 2017-09-25

## 2017-09-25 NOTE — PROGRESS NOTES
Last 5 Patient Entered Redings Current 30 Day Average: 134/87     Recent Readings 9/25/2017 9/25/2017 9/23/2017 9/22/2017 9/22/2017    Systolic BP (mmHg) 138 153 140 133 138    Diastolic BP (mmHg) 93 101 88 87 88    Pulse 83 88 69 65 66          Hypertension Digital Medicine (HDMP) Health  Follow Up    LVM to follow up with Mr. Mal Larkin.    Per 30 day average, blood pressure is well controlled 134/87 mmHg. Encouraged adherence to low sodium diet and physical activity guidelines. Advised patient to call or message with questions or concerns. WCB in 2 weeks.

## 2017-09-26 ENCOUNTER — PATIENT MESSAGE (OUTPATIENT)
Dept: FAMILY MEDICINE | Facility: CLINIC | Age: 62
End: 2017-09-26

## 2017-09-27 ENCOUNTER — PATIENT OUTREACH (OUTPATIENT)
Dept: OTHER | Facility: OTHER | Age: 62
End: 2017-09-27

## 2017-09-27 DIAGNOSIS — I10 ESSENTIAL HYPERTENSION: ICD-10-CM

## 2017-09-27 RX ORDER — LISINOPRIL 40 MG/1
40 TABLET ORAL DAILY
Qty: 90 TABLET | Refills: 3 | Status: SHIPPED | OUTPATIENT
Start: 2017-09-27 | End: 2018-08-02

## 2017-09-27 NOTE — PROGRESS NOTES
Last 5 Patient Entered Redings Current 30 Day Average: 134/87     Recent Readings 9/26/2017 9/25/2017 9/25/2017 9/23/2017 9/22/2017    Systolic BP (mmHg) 122 138 153 140 133    Diastolic BP (mmHg) 84 93 101 88 87    Pulse 79 83 88 69 65        Mr. Larkin reported to lab on Saturday. Sodium is increasing, but still lower than normal. He will continue to remain off of chlorthalidone. Will repeat BMP next week to see if sodium has returned to normal limits.     While his BP is controlled on increased dose of lisinopril 40 mg QD, he is have ADAM again. He said as soon as he stopped the diuretic, he began to have swelling of his feet that is so bad he has trouble putting on his shoes. He said it takes about 20 minutes for him to put his shoes on due to the fluid. Explained that before we can address the fluid retention with another diuretic, we need to get his sodium level normalized. Will check with Dr. Marino to see if he feels Mr. Larkin needs further workup regarding ADAM and if he is okay with starting Lasix 20 mg QD for edema.     Of note, Mr. Larkin does say that he does not eat more than 1,000-1,500 mg of Na/day. Will follow up with Mr. Larkin once his BMP results are reviewed.     Current HTN regimen:  Hypertension Medications             lisinopril (PRINIVIL,ZESTRIL) 40 MG tablet Take 1 tablet (40 mg total) by mouth once daily.        Patient has my contact information and knows to call with any concerns or clinical changes.

## 2017-09-29 DIAGNOSIS — I10 ESSENTIAL HYPERTENSION: ICD-10-CM

## 2017-09-29 RX ORDER — AMLODIPINE BESYLATE 5 MG/1
TABLET ORAL
Qty: 90 TABLET | Refills: 0 | OUTPATIENT
Start: 2017-09-29

## 2017-09-29 NOTE — TELEPHONE ENCOUNTER
Prescription not filled.  Please call patient. There is a message on the prescription stating that it was discontinued by pharmacist in the hypertension digital medicine program for side effects.  Thanks.   Yoselin

## 2017-10-03 ENCOUNTER — LAB VISIT (OUTPATIENT)
Dept: LAB | Facility: HOSPITAL | Age: 62
End: 2017-10-03
Attending: FAMILY MEDICINE
Payer: COMMERCIAL

## 2017-10-03 ENCOUNTER — PATIENT MESSAGE (OUTPATIENT)
Dept: FAMILY MEDICINE | Facility: CLINIC | Age: 62
End: 2017-10-03

## 2017-10-03 ENCOUNTER — OFFICE VISIT (OUTPATIENT)
Dept: PAIN MEDICINE | Facility: CLINIC | Age: 62
End: 2017-10-03
Payer: COMMERCIAL

## 2017-10-03 VITALS
DIASTOLIC BLOOD PRESSURE: 100 MMHG | BODY MASS INDEX: 39.99 KG/M2 | HEART RATE: 76 BPM | WEIGHT: 270 LBS | HEIGHT: 69 IN | SYSTOLIC BLOOD PRESSURE: 159 MMHG

## 2017-10-03 DIAGNOSIS — I10 ESSENTIAL HYPERTENSION: ICD-10-CM

## 2017-10-03 DIAGNOSIS — M79.10 MUSCLE PAIN: ICD-10-CM

## 2017-10-03 DIAGNOSIS — M47.816 LUMBAR FACET ARTHROPATHY: Primary | ICD-10-CM

## 2017-10-03 DIAGNOSIS — M96.1 POSTLAMINECTOMY SYNDROME OF LUMBAR REGION: ICD-10-CM

## 2017-10-03 DIAGNOSIS — M51.36 DDD (DEGENERATIVE DISC DISEASE), LUMBAR: ICD-10-CM

## 2017-10-03 LAB
ANION GAP SERPL CALC-SCNC: 6 MMOL/L
BUN SERPL-MCNC: 9 MG/DL
CALCIUM SERPL-MCNC: 9.3 MG/DL
CHLORIDE SERPL-SCNC: 103 MMOL/L
CO2 SERPL-SCNC: 29 MMOL/L
CREAT SERPL-MCNC: 0.9 MG/DL
EST. GFR  (AFRICAN AMERICAN): >60 ML/MIN/1.73 M^2
EST. GFR  (NON AFRICAN AMERICAN): >60 ML/MIN/1.73 M^2
GLUCOSE SERPL-MCNC: 103 MG/DL
POTASSIUM SERPL-SCNC: 4.8 MMOL/L
SODIUM SERPL-SCNC: 138 MMOL/L

## 2017-10-03 PROCEDURE — 99214 OFFICE O/P EST MOD 30 MIN: CPT | Mod: S$GLB,,, | Performed by: PHYSICIAN ASSISTANT

## 2017-10-03 PROCEDURE — 36415 COLL VENOUS BLD VENIPUNCTURE: CPT | Mod: PO

## 2017-10-03 PROCEDURE — 99999 PR PBB SHADOW E&M-EST. PATIENT-LVL III: CPT | Mod: PBBFAC,,, | Performed by: PHYSICIAN ASSISTANT

## 2017-10-03 PROCEDURE — 80048 BASIC METABOLIC PNL TOTAL CA: CPT

## 2017-10-03 RX ORDER — BACLOFEN 10 MG/1
10 TABLET ORAL 3 TIMES DAILY PRN
Qty: 90 TABLET | Refills: 0 | Status: SHIPPED | OUTPATIENT
Start: 2017-10-03 | End: 2018-01-19

## 2017-10-03 NOTE — PROGRESS NOTES
Referring Physician: No ref. provider found    PCP: Tito Marino MD      CC: back and bilateral leg pain    Interval History:  Mr. Larkin is a 62 y.o. male with low back and bilateral leg pain who presents today for f/u s/p lumbar MB RFA at L2, 3, 4, 5 bilaterally. Reports 20% improvment in pain. Reports he can walk further but is interested in further relief. Reports sharp shooting pain in b/l LEs to his toes. C/o muscle pain in low back and b/l LE. He has not tried muscle relaxants. He does not recall if he has taken Gabapentin in the past. He has had low Na recently. Denies b/b changes. Denies LE weakness. Pain today is rated 8/10.  Pt has been seen in the clinic before, however pt is new to me.     History below per Dr. Tamayo    HPI:   Mal Larkin is a 62 y.o. male referred to us for lower back and bilateral leg pain.  Pain has been present for over 30 years. No recent traumatic incident. He has history of 2 prior lumbar spinesurgeries.  Most recently, he had a L5 laminectomy by Dr. Bryant.  He is known history with a stable grade 2 spondylolisthesis at L5 on S1.he continues have constant aching, burning, deep pain in his lower back.  Lower back pain is more bothersome.  He has occasional sharp shooting pain going down his bilateral legs into his bilateral toes. He has numbness and tingling in his toes. Pain worsens with standing, bending, walking, getting up.  Pain improves with sitting down.  His tried physical therapy with minimal benefit. He has not any lumbar spine injections. E denies any weakness.  No bowel bladder changes.  He rates his pain 6/10 today, 10/10 at worse.    ROS:  CONSTITUTIONAL: No fevers, chills, night sweats, wt. loss, appetite changes  SKIN: no rashes or itching  ENT: No headaches, head trauma, vision changes, or eye pain  LYMPH NODES: None noticed   CV: No chest pain, palpitations.   RESP: No shortness of breath, dyspnea on exertion, cough, wheezing, or hemoptysis  GI: No nausea,  emesis, diarrhea, constipation, melena, hematochezia, pain.    : No dysuria, hematuria, urgency, or frequency   HEME: No easy bruising, bleeding problems  PSYCHIATRIC: No depression, anxiety, psychosis, hallucinations.  NEURO: No seizures, memory loss, dizziness or difficulty sleeping  MSK: + history of present illness      Past Medical History:   Diagnosis Date    Adenomatous colon polyp     + dysplasia    Colon polyp     Fatty liver     Hypertension     Melanoma 2011    in situ - upper back     Past Surgical History:   Procedure Laterality Date    APPENDECTOMY      BACK SURGERY      transverse process fracture    COLONOSCOPY  11/11/2013    Dr. Hayes, 3 year recheck    COLONOSCOPY N/A 12/20/2016    Procedure: COLONOSCOPY;  Surgeon: Atilio Hayes MD;  Location: [facility];  Service: Endoscopy;  Laterality: N/A;    HAND SURGERY      right dupuytrens release on 8/5/13    LUMBAR LAMINECTOMY  2011    melanoma removal  2011    WISDOM TOOTH EXTRACTION       Family History   Problem Relation Age of Onset    Skin cancer Mother     Ovarian cancer Mother     Heart disease Mother     Prostate cancer Father     Colon cancer Father     Cancer Father      glands    Melanoma Father     Melanoma Sister     Asthma Daughter     Fibromyalgia Daughter     Heart disease Maternal Uncle     Alcohol abuse Maternal Uncle     Cancer Paternal Aunt     Cerebral aneurysm Maternal Grandmother     Early death Maternal Grandmother      fall hit head    Heart disease Maternal Grandfather     Cancer Paternal Grandmother     Pancreatic cancer Paternal Grandmother     Cancer Sister      cervix, abd     Melanoma Sister     Skin cancer Sister     Vaginal cancer Sister     Macular degeneration Neg Hx     Retinal detachment Neg Hx     Glaucoma Neg Hx     Psoriasis Neg Hx     Lupus Neg Hx     Eczema Neg Hx      Social History     Social History    Marital status:      Spouse name: N/A    Number of  "children: N/A    Years of education: N/A     Occupational History     [employer]     Social History Main Topics    Smoking status: Former Smoker     Packs/day: 1.00     Years: 30.00     Types: Cigarettes     Quit date: 5/8/2014    Smokeless tobacco: Former User     Types: Chew     Quit date: 9/22/1979    Alcohol use 6.0 oz/week     12 Standard drinks or equivalent per week      Comment: Drinks beer only and mostly during football season    Drug use: No    Sexual activity: Not Asked     Other Topics Concern    None     Social History Narrative    None         Medications/Allergies: See med card    Vitals:    10/03/17 1022   BP: (!) 159/100   Pulse: 76   Weight: 122.5 kg (270 lb)   Height: 5' 9" (1.753 m)   PainSc:   8   PainLoc: Back         Physical exam:    GENERAL: A and O x3, the patient appears well groomed and is in no acute distress.  Skin: No rashes or obvious lesions  HEENT: normocephalic, atraumatic  CARDIOVASCULAR:  Palpable peripheral pulses  LUNGS: easy work of breathing  ABDOMEN: soft, nontender   UPPER EXTREMITIES: Normal alignment, normal range of motion, no atrophy, no skin changes,  hair growth and nail growth normal and equal bilaterally. No swelling, no tenderness.    LOWER EXTREMITIES:  Normal alignment, normal range of motion, no atrophy, no skin changes,  hair growth and nail growth normal and equal bilaterally. No swelling, no tenderness.    LUMBAR SPINE  Lumbar spine: ROM is limited with flexion extension and oblique extension with moderate increased pain.    Micheal's test causes no increased pain on either side.    Supine straight leg raise is positive on right at 60 degrees   Internal and external rotation of the hip causes no increased pain on either side.  Myofascial exam: No tenderness to palpation across lumbar paraspinous muscles.      MENTAL STATUS: normal orientation, speech, language, and fund of knowledge for social situation.  Emotional state " appropriate.    CRANIAL NERVES:  II:  PERRL bilaterally,   III,IV,VI: EOMI.    V:  Facial sensation equal bilaterally  VII:  Facial motor function normal.  VIII:  Hearing equal to finger rub bilaterally  IX/X: Gag normal, palate symmetric  XI:  Shoulder shrug equal, head turn equal  XII:  Tongue midline without fasciculations      MOTOR: Tone and bulk: normal bilateral upper and lower Strength: normal   Delt Bi Tri WE WF     R 5 5 5 5 5 5   L 5 5 5 5 5 5     IP ADD ABD Quad TA Gas HAM  R 5 5 5 5 5 5 5  L 5 5 5 5 5 5 5    SENSATION: Light touch and pinprick intact bilaterally  REFLEXES: normal, symmetric, nonbrisk.  Toes down, no clonus. No hoffmans.  GAIT: normal rise, base, steps, and arm swing.        Imaging:  MRI L-spine 6/2011  IMPRESSION:   1.  DEGENERATION AND MILD CENTRAL EXTRUSION OF THE T12-L1 DISC.  2.  SEVERE SPINAL STENOSIS AT THE L1-2 LEVEL FROM A COMBINATION OF DISC   EXTRUSION AND DEGENERATIVE FACET ARTHROSIS.    3.  SEVERE SPINAL STENOSIS AT THE L2-3 LEVEL FROM A COMBINATION OF DISC   BULGE AND DEGENERATIVE FACET ARTHROSIS.    4.  MILD SPINAL STENOSIS AT THE L3-4 LEVEL FROM A COMBINATION OF DISC   BULGE AND DEGENERATIVE FACET ARTHROSIS.  5.  MINIMAL L4-5 DISC BULGE.  THERE IS MILD LEFT L4-5 FACET JOINT   DEGENERATIVE ARTHROSIS.    6.  BILATERAL L5 SPONDYLOLYSIS WITH GRADE II SPONDYLOLISTHESIS OF L5 ON   S1.  THERE IS SEVERE DEGENERATION OF THE L5-S1 DISC WITH MILD DISC BULGE   AND SEVERE BILATERAL FORAMINAL STENOSIS.  THERE ARE ALSO POSTOPERATIVE   CHANGES OF L5 LAMINECTOMY.      Xray L-spine 6/29/17  1.  Stable radiographic appearance of the lumbar spine demonstrating chronic grade 2 spondylolisthesis of L5 on S1 secondary to bilateral spondylolysis and additional multilevel spondylosis. Postoperative laminectomy changes are also noted at L4 and L5. Severe osseous neural foraminal narrowing at L5-S1 is also again noted.    Assessment:  Mr. Larkin is a 62 y.o. male with back and bilateral leg  pain  1. Lumbar facet arthropathy    2. DDD (degenerative disc disease), lumbar    3. Postlaminectomy syndrome of lumbar region    4. Muscle pain      Plan:  1.  I have stressed the importance of physical activity and exercise to improve overall health  2. Recommend lumbar ELOY at L5-S1. He will consider this. He has limited income from [employer]  3.  Follow up with PCP regarding elevated BP  4. Start Baclofen 10 mg TID prn #90.  5. F/u prn       Thank you for referring this interesting patient, and I look forward to continuing to collaborate in his care.

## 2017-10-05 ENCOUNTER — PATIENT OUTREACH (OUTPATIENT)
Dept: OTHER | Facility: OTHER | Age: 62
End: 2017-10-05

## 2017-10-05 DIAGNOSIS — I10 ESSENTIAL HYPERTENSION: Primary | ICD-10-CM

## 2017-10-05 RX ORDER — FUROSEMIDE 20 MG/1
20 TABLET ORAL DAILY
Qty: 30 TABLET | Refills: 6 | Status: SHIPPED | OUTPATIENT
Start: 2017-10-05 | End: 2018-05-11 | Stop reason: SDUPTHER

## 2017-10-05 NOTE — PROGRESS NOTES
Last 5 Patient Entered Redings Current 30 Day Average: 133/86     Recent Readings 10/4/2017 10/3/2017 10/2/2017 10/2/2017 10/1/2017    Systolic BP (mmHg) 123 132 139 142 139    Diastolic BP (mmHg) 83 89 86 90 86    Pulse 68 72 74 74 98        Mr. Saba' BP average remains controlled. He repeated BMP Tuesday and sodium is now WNL. He does still have ADAM. Discussed starting Lasix with Dr. Marino for Mr. Larkin's swelling and he agreed with this plan. Will start Lasix 20 mg QD and will check BMP again on 10/16. Will also follow up with Mr. Larkin again next week to see if swelling is improved with this dose of Lasix.     Current HTN regimen:  Hypertension Medications             furosemide (LASIX) 20 MG tablet Take 1 tablet (20 mg total) by mouth once daily.    lisinopril (PRINIVIL,ZESTRIL) 40 MG tablet Take 1 tablet (40 mg total) by mouth once daily.        Will continue to monitor regularly. Will follow up next week, sooner if BP begins to trend upward or downward.    Patient has my contact information and knows to call with any concerns or clinical changes.

## 2017-10-09 NOTE — PROGRESS NOTES
Last 5 Patient Entered Redings Current 30 Day Average: 132/85     Recent Readings 10/6/2017 10/4/2017 10/3/2017 10/2/2017 10/2/2017    Systolic BP (mmHg) 117 123 132 139 142    Diastolic BP (mmHg) 84 83 89 86 90    Pulse 78 68 72 74 74          Hypertension Digital Medicine (HDMP) Health  Follow Up    LVM to follow up with Mr. Mal Larkin.    Per 30 day average, blood pressure is well controlled 132/85 mmHg. Encouraged adherence to low sodium diet and physical activity guidelines. Advised patient to call or message with questions or concerns. WCB in 2 weeks.

## 2017-10-10 NOTE — PROGRESS NOTES
"Last 5 Patient Entered Redings Current 30 Day Average: 132/85     Recent Readings 10/6/2017 10/4/2017 10/3/2017 10/2/2017 10/2/2017    Systolic BP (mmHg) 117 123 132 139 142    Diastolic BP (mmHg) 84 83 89 86 90    Pulse 78 68 72 74 74          Hypertension Digital Medicine Program (HDMP): Health  Follow Up    Lifestyle Modifications:    1.Low sodium diet: yes. Patient reports he is still following a low sodium diet. Patient states he "watches" his salt intake and food d/t wife having food allergies. Patient reports he eats green vegtables and meat only.     2.Physical activity: no. Patient reports he has not been able to do any physical activity because of back pain. We discussed options on ways to increase physical activity.  states he would like to try the home chair exercises to help increase his physical activity. I will send chair exercises through the patient's my chart.      3.Hypotension/Hypertension symptoms: no. Patient verbalizes he has no signs or symptoms.   Frequency/Alleviating factors/Precipitating factors, etc.     4.Patient has been compliant with the medication regimen.     Patient states he is doing "well".Pateint reports he will be cooking tenderloin with farley wrapped around it this week. We discussed watching salt intake and staying 1500 mg or less. Patient reports if he is not able to exercise because of back pain ,then he will be looking into "bariatric surgery". I will follow up with the patient to see how the exercises are doing or if we need to do another alternative.     Follow up with Mr. Mal Larkin completed. No further questions or concerns. I will follow up in a few weeks to assess progress.     "

## 2017-10-16 ENCOUNTER — LAB VISIT (OUTPATIENT)
Dept: LAB | Facility: HOSPITAL | Age: 62
End: 2017-10-16
Attending: FAMILY MEDICINE
Payer: COMMERCIAL

## 2017-10-16 ENCOUNTER — PATIENT MESSAGE (OUTPATIENT)
Dept: FAMILY MEDICINE | Facility: CLINIC | Age: 62
End: 2017-10-16

## 2017-10-16 DIAGNOSIS — I10 ESSENTIAL HYPERTENSION: ICD-10-CM

## 2017-10-16 LAB
ANION GAP SERPL CALC-SCNC: 11 MMOL/L
BUN SERPL-MCNC: 10 MG/DL
CALCIUM SERPL-MCNC: 10.3 MG/DL
CHLORIDE SERPL-SCNC: 97 MMOL/L
CO2 SERPL-SCNC: 28 MMOL/L
CREAT SERPL-MCNC: 1 MG/DL
EST. GFR  (AFRICAN AMERICAN): >60 ML/MIN/1.73 M^2
EST. GFR  (NON AFRICAN AMERICAN): >60 ML/MIN/1.73 M^2
GLUCOSE SERPL-MCNC: 97 MG/DL
POTASSIUM SERPL-SCNC: 4.3 MMOL/L
SODIUM SERPL-SCNC: 136 MMOL/L

## 2017-10-16 PROCEDURE — 80048 BASIC METABOLIC PNL TOTAL CA: CPT

## 2017-10-16 PROCEDURE — 36415 COLL VENOUS BLD VENIPUNCTURE: CPT | Mod: PO

## 2017-10-18 ENCOUNTER — PATIENT OUTREACH (OUTPATIENT)
Dept: OTHER | Facility: OTHER | Age: 62
End: 2017-10-18

## 2017-10-18 NOTE — PROGRESS NOTES
Last 5 Patient Entered Redings Current 30 Day Average: 132/86     Recent Readings 10/16/2017 10/15/2017 10/15/2017 10/15/2017 10/15/2017    Systolic BP (mmHg) 107 153 152 159 173    Diastolic BP (mmHg) 77 98 100 105 109    Pulse 71 78 83 87 88        Mr. Larkin's BP average is well controlled. He still has ADAM with Lasix 20 QD, but feels it is much better than without a diuretic. His sodium is WNL, but on the lower end of normal, so hesitant to increase Lasix at this time. He is awaiting a consultation with a surgeon to discuss bariatric surgery. He feels this is the only way to lose weight for him. He will keep us informed about this.     Current HTN regimen:  Hypertension Medications             furosemide (LASIX) 20 MG tablet Take 1 tablet (20 mg total) by mouth once daily.    lisinopril (PRINIVIL,ZESTRIL) 40 MG tablet Take 1 tablet (40 mg total) by mouth once daily.        Will continue to monitor regularly. Will follow up in 2-3 months, sooner if BP begins to trend upward or downward.    Patient has my contact information and knows to call with any concerns or clinical changes.

## 2017-10-24 ENCOUNTER — PATIENT OUTREACH (OUTPATIENT)
Dept: OTHER | Facility: OTHER | Age: 62
End: 2017-10-24

## 2017-10-24 NOTE — PROGRESS NOTES
"Last 5 Patient Entered Redings Current 30 Day Average: 129/85     Recent Readings 10/24/2017 10/23/2017 10/20/2017 10/19/2017 10/19/2017    Systolic BP (mmHg) 120 122 124 118 118    Diastolic BP (mmHg) 81 89 88 81 91    Pulse 66 75 77 79 82        Hypertension Digital Medicine Program (HDMP): Health  Follow Up    Lifestyle Modifications:    1.Low sodium diet: yes. Patient reports he is still following a low sodium. He reports eating a one meal a day. Patient reports he has not been able to cook for himself d/t having pain in the back and wife working. We discussed trying to eat more than one meal a day. Patient reports he is still drinking 2 liters of water.     2.Physical activity: no. Patient reports he still not getting any physical activity d/t pain in his feet and back. We discussed trying the links that were sent and found out that only one was able to open up for the patient. I resend the links to Mr. Larkin through his other e-mail and he was able to open them all. Patient states he "try the exercises and see how they go".     3.Hypotension/Hypertension symptoms: no. Patient reports he has no s/s of hypo/hypertension.   Frequency/Alleviating factors/Precipitating factors, etc.     4.Patient has been compliant with the medication regimen.     Follow up with Mr. Mal Larkin completed. No further questions or concerns. I will follow up in a few weeks to assess progress.     Patient states he is doing "good." Patient reports he found out his insurance does not cover the bariatric surgery he wants to do. Patient states he is looking at other options to able to have the surgery. Patient states , "If I could have the surgery I would be able to move more and exercise more."  We discussed that weight loss takes time to see result and I encourage the patient to just continue following the low sodium diet and doing the chair exercises.   "

## 2017-11-07 ENCOUNTER — PATIENT OUTREACH (OUTPATIENT)
Dept: OTHER | Facility: OTHER | Age: 62
End: 2017-11-07

## 2017-11-07 NOTE — PROGRESS NOTES
"Last 5 Patient Entered Readings Current 30 Day Average: 124/83     Recent Readings 11/4/2017 11/3/2017 11/1/2017 11/1/2017 10/30/2017    Systolic BP (mmHg) 111 114 123 131 137    Diastolic BP (mmHg) 79 80 87 94 88    Pulse 71 76 88 90 73          Hypertension Digital Medicine Program (HDMP): Health  Follow Up    Lifestyle Modifications:    1.Low sodium diet: yes. Patient reports he is still following a low sodium diet.    2.Physical activity: yes. Patient states he has been "doing chair exercises every other day".     3.Hypotension/Hypertension symptoms: no . Patient reports he has no s/s of hypo/hypertension.   Frequency/Alleviating factors/Precipitating factors, etc.     4.Patient has been compliant with the medication regimen.     Follow up with Mr. Mal Larkin completed. No further questions or concerns. I will follow up in a few weeks to assess progress.      Patient states he is doing "good".   "

## 2017-11-30 ENCOUNTER — PATIENT OUTREACH (OUTPATIENT)
Dept: OTHER | Facility: OTHER | Age: 62
End: 2017-11-30

## 2017-11-30 NOTE — PROGRESS NOTES
Last 5 Patient Entered Readings Current 30 Day Average: 126/83     Recent Readings 11/28/2017 11/28/2017 11/21/2017 11/19/2017 11/19/2017    Systolic BP (mmHg) 129 143 146 147 152    Diastolic BP (mmHg) 72 94 87 89 94    Pulse 82 82 72 65 65        Patient's BP average is controlled based on 2017 ACC/AHA HTN guidelines of goal BP <130/80.      Patient denies s/s of hypotension (lightheadedness, dizziness, nausea, fatigue) associated with low readings. Instructed patient to inform me if this occurs, patient confirms understanding.      Mr. Larkin has recently lost a friend unexpectedly. He feels some of his low BP readings were while he was taking a muscle relaxer, which he has since stopped taking. His BP is very close to goal. No medication changes required at this time.      Current HTN regimen:  Hypertension Medications             furosemide (LASIX) 20 MG tablet Take 1 tablet (20 mg total) by mouth once daily.    lisinopril (PRINIVIL,ZESTRIL) 40 MG tablet Take 1 tablet (40 mg total) by mouth once daily.          Patient's health , Gregoria Lobato, will be following up every 3-4 weeks. I will continue to monitor regularly and will follow up in 2-3 months, sooner if BP begins to trend upward or downward.    Patient has my contact information and knows to call with any concerns or clinical changes.

## 2017-12-12 ENCOUNTER — PATIENT OUTREACH (OUTPATIENT)
Dept: OTHER | Facility: OTHER | Age: 62
End: 2017-12-12

## 2017-12-12 NOTE — PROGRESS NOTES
"Last 5 Patient Entered Readings Current 30 Day Average: 131/80     Recent Readings 12/10/2017 12/2/2017 11/28/2017 11/28/2017 11/21/2017    Systolic BP (mmHg) 120 118 129 143 146    Diastolic BP (mmHg) 66 73 72 94 87    Pulse 73 72 82 82 72          Hypertension Digital Medicine Program (HDMP): Health  Follow Up    Lifestyle Modifications:    1.Low sodium diet: yes. Patient reports he is still following a low sodium diet. Patient states he eats one meal a day still.     2.Physical activity: yes. Patient reports he is still doing the chair exercises.     3.Hypotension/Hypertension symptoms: no. Patient reports he has no s/s of hypo/hypertension.  Frequency/Alleviating factors/Precipitating factors, etc.     4.Patient has been compliant with the medication regimen.     Follow up with Mr. Mal Larkin completed. No further questions or concerns. I will follow up in a few weeks to assess progress.     Patient states he is doing "good".   "

## 2017-12-26 DIAGNOSIS — E78.5 HYPERLIPIDEMIA: ICD-10-CM

## 2017-12-26 RX ORDER — ATORVASTATIN CALCIUM 10 MG/1
TABLET, FILM COATED ORAL
Qty: 90 TABLET | Refills: 0 | Status: SHIPPED | OUTPATIENT
Start: 2017-12-26 | End: 2018-03-23 | Stop reason: SDUPTHER

## 2018-01-09 ENCOUNTER — PATIENT OUTREACH (OUTPATIENT)
Dept: OTHER | Facility: OTHER | Age: 63
End: 2018-01-09

## 2018-01-09 NOTE — PROGRESS NOTES
"Last 5 Patient Entered Readings Current 30 Day Average: 131/81     Recent Readings 1/7/2018 1/7/2018 1/7/2018 1/5/2018 1/5/2018    SBP (mmHg) 131 142 147 141 148    DBP (mmHg) 77 82 91 91 92    Pulse 75 76 77 68 68          Hypertension Digital Medicine Program (HDMP): Health  Follow Up    Lifestyle Modifications:    1.Low sodium diet: yes. Patient reports he still watching his sodium intake. Patient report he has been having self control and not eat foods with high sodium or sweets when at work.     2.Physical activity: yes. Patient reports he is still doing the chair exercises.     3.Hypotension/Hypertension symptoms: no. Patient reports he has no s/s of hypo/hypertension.   Frequency/Alleviating factors/Precipitating factors, etc.     4.Patient has been compliant with the medication regimen.     Follow up with Mr. Mal Larkin completed. No further questions or concerns. I will follow up in a few weeks to assess progress.     Patient states he is doing "good". Patient reports he is saving up to get the bariatric surgery.   "

## 2018-01-16 ENCOUNTER — PATIENT MESSAGE (OUTPATIENT)
Dept: OTHER | Facility: OTHER | Age: 63
End: 2018-01-16

## 2018-01-17 ENCOUNTER — HOSPITAL ENCOUNTER (OUTPATIENT)
Dept: RADIOLOGY | Facility: CLINIC | Age: 63
Discharge: HOME | End: 2018-01-17
Attending: PHYSICIAN ASSISTANT
Payer: COMMERCIAL

## 2018-01-17 ENCOUNTER — OFFICE VISIT (OUTPATIENT)
Dept: FAMILY MEDICINE | Facility: CLINIC | Age: 63
End: 2018-01-17
Payer: COMMERCIAL

## 2018-01-17 VITALS
TEMPERATURE: 98 F | RESPIRATION RATE: 18 BRPM | WEIGHT: 281.31 LBS | OXYGEN SATURATION: 94 % | HEART RATE: 104 BPM | DIASTOLIC BLOOD PRESSURE: 82 MMHG | SYSTOLIC BLOOD PRESSURE: 148 MMHG | HEIGHT: 69 IN | BODY MASS INDEX: 41.67 KG/M2

## 2018-01-17 DIAGNOSIS — R50.9 FEVER, UNSPECIFIED FEVER CAUSE: ICD-10-CM

## 2018-01-17 DIAGNOSIS — J18.9 PNEUMONIA DUE TO INFECTIOUS ORGANISM, UNSPECIFIED LATERALITY, UNSPECIFIED PART OF LUNG: ICD-10-CM

## 2018-01-17 DIAGNOSIS — R05.9 COUGH: ICD-10-CM

## 2018-01-17 DIAGNOSIS — R06.2 WHEEZING: ICD-10-CM

## 2018-01-17 DIAGNOSIS — R00.0 TACHYCARDIA: ICD-10-CM

## 2018-01-17 DIAGNOSIS — R05.9 COUGH: Primary | ICD-10-CM

## 2018-01-17 LAB
CTP QC/QA: YES
FLUAV AG NPH QL: NEGATIVE
FLUBV AG NPH QL: NEGATIVE

## 2018-01-17 PROCEDURE — 96372 THER/PROPH/DIAG INJ SC/IM: CPT | Mod: S$GLB,,, | Performed by: FAMILY MEDICINE

## 2018-01-17 PROCEDURE — 71046 X-RAY EXAM CHEST 2 VIEWS: CPT | Mod: TC,FY,PO

## 2018-01-17 PROCEDURE — 99214 OFFICE O/P EST MOD 30 MIN: CPT | Mod: S$GLB,,, | Performed by: PHYSICIAN ASSISTANT

## 2018-01-17 PROCEDURE — 87804 INFLUENZA ASSAY W/OPTIC: CPT | Mod: 59,QW,S$GLB, | Performed by: PHYSICIAN ASSISTANT

## 2018-01-17 PROCEDURE — 99999 PR PBB SHADOW E&M-EST. PATIENT-LVL V: CPT | Mod: PBBFAC,,, | Performed by: PHYSICIAN ASSISTANT

## 2018-01-17 PROCEDURE — 71046 X-RAY EXAM CHEST 2 VIEWS: CPT | Mod: 26,,, | Performed by: RADIOLOGY

## 2018-01-17 RX ORDER — AZITHROMYCIN 250 MG/1
TABLET, FILM COATED ORAL
Qty: 6 TABLET | Refills: 0 | Status: SHIPPED | OUTPATIENT
Start: 2018-01-17 | End: 2018-01-19

## 2018-01-17 RX ORDER — PREDNISONE 20 MG/1
20 TABLET ORAL DAILY
Qty: 5 TABLET | Refills: 0 | Status: SHIPPED | OUTPATIENT
Start: 2018-01-17 | End: 2018-01-22

## 2018-01-17 RX ORDER — CEFTRIAXONE 1 G/1
1 INJECTION, POWDER, FOR SOLUTION INTRAMUSCULAR; INTRAVENOUS
Status: COMPLETED | OUTPATIENT
Start: 2018-01-17 | End: 2018-01-17

## 2018-01-17 RX ORDER — PROMETHAZINE HYDROCHLORIDE AND DEXTROMETHORPHAN HYDROBROMIDE 6.25; 15 MG/5ML; MG/5ML
5 SYRUP ORAL EVERY 8 HOURS PRN
Qty: 180 ML | Refills: 0 | Status: SHIPPED | OUTPATIENT
Start: 2018-01-17 | End: 2018-01-25 | Stop reason: ALTCHOICE

## 2018-01-17 RX ADMIN — CEFTRIAXONE 1 G: 1 INJECTION, POWDER, FOR SOLUTION INTRAMUSCULAR; INTRAVENOUS at 04:01

## 2018-01-17 NOTE — PROGRESS NOTES
Subjective:       Patient ID: Mal Larkin is a 62 y.o. male.    Chief Complaint: Cough; Chest Congestion; and Wheezing    Cough   This is a new problem. The current episode started in the past 7 days. The problem has been gradually worsening. The problem occurs every few minutes. The cough is productive of sputum. Associated symptoms include chills, ear congestion, a fever, headaches, nasal congestion, postnasal drip and wheezing. Pertinent negatives include no chest pain, ear pain, heartburn, rash, rhinorrhea, shortness of breath or sweats. Associated symptoms comments: Subjective fever  . Treatments tried: OTC sinus decongestant, OTC cough medication. The treatment provided no relief. There is no history of asthma.   Wheezing    This is a new problem. The current episode started in the past 7 days. The problem occurs daily. The problem has been unchanged. Associated symptoms include chills, coryza, coughing, a fever, headaches and sputum production. Pertinent negatives include no chest pain, diarrhea, ear pain, neck pain, rash, rhinorrhea, shortness of breath or vomiting. Nothing aggravates the symptoms. He has tried OTC cough suppressant for the symptoms. The treatment provided mild relief. There is no history of asthma.     Review of Systems   Constitutional: Positive for chills and fever. Negative for activity change and unexpected weight change.   HENT: Positive for postnasal drip. Negative for ear pain, hearing loss, rhinorrhea and trouble swallowing.    Eyes: Negative for discharge and visual disturbance.   Respiratory: Positive for cough, sputum production and wheezing. Negative for chest tightness and shortness of breath.    Cardiovascular: Negative for chest pain and palpitations.   Gastrointestinal: Negative for blood in stool, constipation, diarrhea, heartburn and vomiting.   Endocrine: Negative for polydipsia and polyuria.   Genitourinary: Negative for difficulty urinating, hematuria and urgency.    Musculoskeletal: Negative for arthralgias, joint swelling and neck pain.   Skin: Negative for rash.   Neurological: Positive for headaches. Negative for weakness.   Psychiatric/Behavioral: Negative for confusion and dysphoric mood.       Objective:      Physical Exam   Constitutional: He is oriented to person, place, and time.   HENT:   Mouth/Throat: Oropharynx is clear and moist. No oropharyngeal exudate.   Posterior oropharynx mildly erythematous with post nasal drip present  Maxillary sinuses TTP   Eyes:   Conjunctiva mildly erythematous and watery   Cardiovascular: Normal rate and regular rhythm.    Pulmonary/Chest: Effort normal. He has wheezes.   Abdominal: Soft. Bowel sounds are normal. He exhibits no distension. There is no tenderness.   Musculoskeletal: He exhibits no edema.   Neurological: He is alert and oriented to person, place, and time.   Skin: No erythema.   Psychiatric: His behavior is normal.       Assessment:       1. Cough    2. Fever, unspecified fever cause    3. Wheezing    4. Pneumonia due to infectious organism, unspecified laterality, unspecified part of lung    5. Tachycardia        Plan:   Mal was seen today for cough, chest congestion and wheezing.    Diagnoses and all orders for this visit:    Cough  -     X-Ray Chest PA And Lateral; Future  -     POCT Influenza A/B  -     predniSONE (DELTASONE) 20 MG tablet; Take 1 tablet (20 mg total) by mouth once daily.  -     promethazine-dextromethorphan (PROMETHAZINE-DM) 6.25-15 mg/5 mL Syrp; Take 5 mLs by mouth every 8 (eight) hours as needed.    Fever, unspecified fever cause  -     X-Ray Chest PA And Lateral; Future  -     POCT Influenza A/B    Wheezing  -     X-Ray Chest PA And Lateral; Future  -     predniSONE (DELTASONE) 20 MG tablet; Take 1 tablet (20 mg total) by mouth once daily.    Pneumonia due to infectious organism, unspecified laterality, unspecified part of lung  -     cefTRIAXone injection 1 g; Inject 1 g into the muscle one  time.  -     azithromycin (Z-BROOKLYN) 250 MG tablet; Take 2 tablets by mouth on day 1; Take 1 tablet by mouth on days 2-5    Tachycardia  Likely due to acute illness and oral decongestant  Follow up in 2 days    Take antibiotics with food.  Increase fluid intake.  Call the clinic if symptoms worsen, new symptoms develop or if you are not any better after completion of your antibiotics.    If symptoms worsen seek urgent medical attention attention. Patient and wife verbalized understanding.

## 2018-01-17 NOTE — PATIENT INSTRUCTIONS
Weight Management: Getting Started  Healthy bodies come in all shapes and sizes. Not all bodies are made to be thin. For some people, a healthy weight is higher than the average weight listed on weight charts. Your healthcare provider can help you decide on a healthy weight for you.    Reasons to lose weight  Losing weight can help with some health problems, such as high blood pressure, heart disease, diabetes, sleep apnea, and arthritis. You may also feel more energy.  Set your long-term goal  Your goal doesn't even have to be a specific weight. You may decide on a fitness goal (such as being able to walk 10 miles a week), or a health goal (such as lowering your blood pressure). Choose a goal that is measurable and reasonable, so you know when you've reached it. A goal of reaching a BMI of less than 25 is not always reasonable (or possible).   Make an action plan  Habits dont change overnight. Setting your goals too high can leave you feeling discouraged if you cant reach them. Be realistic. Choose one or two small changes you can make now. Set an action plan for how you are going to make these changes. When you can stick to this plan, keep making a few more small changes. Taking small steps will help you stay on the path to success.  Track your progress  Write down your goals. Then, keep a daily record of your progress. Write down what you eat and how active you are. This record lets you look back on how much youve done. It may also help when youre feeling frustrated. Reward yourself for success. Even if you dont reach every goal, give yourself credit for what you do get done.  Get support  Encouragement from others can help make losing weight easier. Ask your family members and friends for support. They may even want to join you. Also look to your healthcare provider, registered dietitian, and  for help. Your local hospital can give you more information about nutrition, exercise, and  weight loss.  Date Last Reviewed: 1/31/2016 © 2000-2017 [employer]. [address] [place] [address], [place] [address]. All rights reserved. This information is not intended as a substitute for professional medical care. Always follow your healthcare professional's instructions.          Walking for Fitness  Fitness walking has something for everyone, even people who are already fit. Walking is one of the safest ways to condition your body aerobically. It can boost energy, help you lose weight, and reduce stress.    Physical benefits  · Walking strengthens your heart and lungs, and tones your muscles.  · When walking, your feet land with less impact than in other sports. This reduces chances of muscle, bone, and joint injury.  · Regular walking improves your cholesterol levels and lowers your risk of heart disease. And it helps you control your blood sugar if you have diabetes.  · Walking is a weight-bearing activity, which helps maintain bone density. This can help prevent osteoporosis.  Personal rewards  · Taking walks can help you relax and manage stress. And fitness walking may make you feel better about yourself.  · Walking can help you sleep better at night and make you less likely to be depressed.  · Regular walking may help maintain your memory as you get older.  · Walking is a great way to spend extra time with friends and family members. Be sure to invite your dog along!  Q&A about fitness walking  Q: Will walking keep me fit?  A: Yes. Regular walking at the right pace gives you all the benefits of other aerobic activities, such as jogging and swimming.  Q: Will walking help me lose weight and keep it off?  A: Yes. Per mile, walking can burn as many calories as jogging. Your health care provider can help work walking into your weight-loss plan.  Q: Is walking safe for my health?  A: Yes. Walking is safe if you have high blood pressure, diabetes, heart disease, or other conditions. Talk to your  healthcare provider before you start.  Date Last Reviewed: 4/1/2017  © 2030-7592 [employer]. [address] [place] [address], [place] [address]. All rights reserved. This information is not intended as a substitute for professional medical care. Always follow your healthcare professional's instructions.            Established High Blood Pressure    High blood pressure (hypertension) is a chronic disease. Often, healthcare providers dont know what causes it. But it can be caused by certain health conditions and medicines.  If you have high blood pressure, you may not have any symptoms. If you do have symptoms, they may include headache, dizziness, changes in your vision, chest pain, and shortness of breath. But even without symptoms, high blood pressure thats not treated raises your risk for heart attack and stroke. High blood pressure is a serious health risk and shouldnt be ignored.  A blood pressure reading is made up of two numbers: a higher number over a lower number. The top number is the systolic pressure. The bottom number is the diastolic pressure. A normal blood pressure is a systolic pressure of  less than 120 over a diastolic pressure of less than 80. You will see your blood pressure readings written together. For example, a person with a systolic pressure of 188 and a diastolic pressure of 78 will have 118/78 written in the medical record.  High blood pressure is when either the top number is 140 or higher, or the bottom number is 90 or higher. This must be the result when taking your blood pressure a number of times. The blood pressures between normal and high are called prehypertension.  Home care  If you have high blood pressure, you should do what is listed below to lower your blood pressure. If you are taking medicines for high blood pressure, these methods may reduce or end your need for medicines in the future.  · Begin a weight-loss program if you are overweight.  · Cut back on how much  salt you get in your diet. Heres how to do this:  ¨ Dont eat foods that have a lot of salt. These include olives, pickles, smoked meats, and salted potato chips.  ¨ Dont add salt to your food at the table.  ¨ Use only small amounts of salt when cooking.  · Start an exercise program. Talk with your healthcare provider about the type of exercise program that would be best for you. It doesn't have to be hard. Even brisk walking for 20 minutes 3 times a week is a good form of exercise.  · Dont take medicines that stimulate the heart. This includes many over-the-counter cold and sinus decongestant pills and sprays, as well as diet pills. Check the warnings about hypertension on the label. Before buying any over-the-counter medicines or supplements, always ask the pharmacist about the product's potential interaction with your high blood pressure and your high blood pressure medicines.  · Stimulants such as amphetamine or cocaine could be deadly for someone with high blood pressure. Never take these.  · Limit how much caffeine you get in your diet. Switch to caffeine-free products.  · Stop smoking. If you are a long-time smoker, this can be hard. Talk to your healthcare provider about medicines and nicotine replacement options to help you. Also, enroll in a stop-smoking program to make it more likely that you will quit for good.  · Learn how to handle stress. This is an important part of any program to lower blood pressure. Learn about relaxation methods like meditation, yoga, or biofeedback.  · If your provider prescribed medicines, take them exactly as directed. Missing doses may cause your blood pressure get out of control.  · If you miss a dose or doses, check with your healthcare provider or pharmacist about what to do.  · Consider buying an automatic blood pressure machine. Ask your provider for a recommendation. You can get one of these at most pharmacies.     The American Heart Association recommends the  following guidelines for home blood pressure monitoring:  · Don't smoke or drink coffee for 30 minutes before taking your blood pressure.  · Go to the bathroom before the test.  · Relax for 5 minutes before taking the measurement.  · Sit with your back supported (don't sit on a couch or soft chair); keep your feet on the floor uncrossed. Place your arm on a solid flat surface (like a table) with the upper part of the arm at heart level. Place the middle of the cuff directly above the eye of the elbow. Check the monitor's instruction manual for an illustration.  · Take multiple readings. When you measure, take 2 to 3 readings one minute apart and record all of the results.  · Take your blood pressure at the same time every day, or as your healthcare provider recommends.  · Record the date, time, and blood pressure reading.  · Take the record with you to your next medical appointment. If your blood pressure monitor has a built-in memory, simply take the monitor with you to your next appointment.  · Call your provider if you have several high readings. Don't be frightened by a single high blood pressure reading, but if you get several high readings, check in with your healthcare provider.  · Note: When blood pressure reaches a systolic (top number) of 180 or higher OR diastolic (bottom number) of 110 or higher, seek emergency medical treatment.  Follow-up care  You will need to see your healthcare provider regularly. This is to check your blood pressure and to make changes to your medicines. Make a follow-up appointment as directed. Bring the record of your home blood pressure readings to the appointment.  When to seek medical advice  Call your healthcare provider right away if any of these occur:  · Blood pressure reaches a systolic (upper number) of 180 or higher OR a diastolic (bottom number) of 110 or higher  · Chest pain or shortness of breath  · Severe headache  · Throbbing or rushing sound in the  ears  · Nosebleed  · Sudden severe pain in your belly (abdomen)  · Extreme drowsiness, confusion, or fainting  · Dizziness or spinning sensation (vertigo)  · Weakness of an arm or leg or one side of the face  · You have problems speaking or seeing   Date Last Reviewed: 12/1/2016  © 7684-3361 [employer]. [address] [place] [address], [place] [id number]. All rights reserved. This information is not intended as a substitute for professional medical care. Always follow your healthcare professional's instructions.

## 2018-01-17 NOTE — PROGRESS NOTES
2 identifiers, name and , used to confirm patient identity.  Procedure was explained and patient verbalized understanding. Rocephin 1 Gm given IM in the left upper outer quadrant of the gluteus using sterile technique. Patient tolerated procedure well. No residual bleeding noted at the injection site. Patient waited in office for 20 minutes post injection without adverse reaction.

## 2018-01-18 ENCOUNTER — PATIENT OUTREACH (OUTPATIENT)
Dept: OTHER | Facility: OTHER | Age: 63
End: 2018-01-18

## 2018-01-18 NOTE — PROGRESS NOTES
Last 5 Patient Entered Readings                                      Current 30 Day Average: 131/81     Recent Readings 2018    SBP (mmHg) 131 142 147 141 148    DBP (mmHg) 77 82 91 91 92    Pulse 75 76 77 68 68          18- Called Patient today in regards of his BP machine still not working d/t it  on him last week. LVM for the patient to call me back and I will send him a [employer] message for him to go to the [facility] to exchange the machine out for a new one.      Patient called me back and states he has a home cuff he can use and will manually put his readings in until he can go to the [facility] next week. Patient states she has Pneumonia and he is on antibotics and couch medicine that is  Prescribed that is going to cause his BP to trend up. I told him to wait till next week to submit another reading to see if his BP readings have gone down once he is done with taking his medicine.

## 2018-01-19 ENCOUNTER — OFFICE VISIT (OUTPATIENT)
Dept: FAMILY MEDICINE | Facility: CLINIC | Age: 63
End: 2018-01-19
Payer: COMMERCIAL

## 2018-01-19 ENCOUNTER — PATIENT MESSAGE (OUTPATIENT)
Dept: FAMILY MEDICINE | Facility: CLINIC | Age: 63
End: 2018-01-19

## 2018-01-19 VITALS
BODY MASS INDEX: 42.29 KG/M2 | HEART RATE: 75 BPM | DIASTOLIC BLOOD PRESSURE: 84 MMHG | SYSTOLIC BLOOD PRESSURE: 150 MMHG | OXYGEN SATURATION: 97 % | HEIGHT: 69 IN | TEMPERATURE: 98 F | WEIGHT: 285.5 LBS

## 2018-01-19 DIAGNOSIS — I10 ESSENTIAL HYPERTENSION: ICD-10-CM

## 2018-01-19 DIAGNOSIS — J18.9 PNEUMONIA DUE TO INFECTIOUS ORGANISM, UNSPECIFIED LATERALITY, UNSPECIFIED PART OF LUNG: Primary | ICD-10-CM

## 2018-01-19 PROCEDURE — 99999 PR PBB SHADOW E&M-EST. PATIENT-LVL III: CPT | Mod: PBBFAC,,, | Performed by: PHYSICIAN ASSISTANT

## 2018-01-19 PROCEDURE — 99213 OFFICE O/P EST LOW 20 MIN: CPT | Mod: S$GLB,,, | Performed by: PHYSICIAN ASSISTANT

## 2018-01-19 NOTE — PROGRESS NOTES
Subjective:       Patient ID: Mal Larkin is a 62 y.o. male.    Chief Complaint: folllow up cough (patient states feeling much better)    Mr. Larkin comes to clinic today for 2 day follow up of pneumonia. The patient was treated with a rocephin shot in office, oral steroids, and oral antibiotics. The patient reports he is feeling much better. The patient denies fever for the last 24 hours. Patient has no additional complaints.       Review of Systems   Constitutional: Negative for activity change and unexpected weight change.   HENT: Negative for hearing loss, rhinorrhea and trouble swallowing.    Eyes: Negative for discharge and visual disturbance.   Respiratory: Negative for chest tightness and wheezing.    Cardiovascular: Negative for chest pain and palpitations.   Gastrointestinal: Negative for blood in stool, constipation, diarrhea and vomiting.   Endocrine: Negative for polydipsia and polyuria.   Genitourinary: Negative for difficulty urinating, hematuria and urgency.   Musculoskeletal: Negative for arthralgias, joint swelling and neck pain.   Neurological: Negative for weakness and headaches.   Psychiatric/Behavioral: Negative for confusion and dysphoric mood.       Objective:      Physical Exam   Constitutional: He is oriented to person, place, and time.   HENT:   Mouth/Throat: Oropharynx is clear and moist. No oropharyngeal exudate.   Eyes: Conjunctivae are normal. Pupils are equal, round, and reactive to light.   Cardiovascular: Normal rate and regular rhythm.    Pulmonary/Chest: Effort normal and breath sounds normal. He has no wheezes.   Abdominal: There is no tenderness.   Musculoskeletal: He exhibits no edema.   Lymphadenopathy:     He has no cervical adenopathy.   Neurological: He is alert and oriented to person, place, and time.   Skin: No erythema.   Psychiatric: His behavior is normal.       Assessment:       1. Pneumonia due to infectious organism, unspecified laterality, unspecified part of  lung    2. Essential hypertension        Plan:     Mal was seen today for folllow up cough.    Diagnoses and all orders for this visit:    Pneumonia due to infectious organism, unspecified laterality, unspecified part of lung  Symptoms improving  Continue and complete prescribed medication  Essential hypertension  Elevated  Low salt diet  Recheck in 10 days at follow up appt.

## 2018-01-23 ENCOUNTER — TELEPHONE (OUTPATIENT)
Dept: FAMILY MEDICINE | Facility: CLINIC | Age: 63
End: 2018-01-23

## 2018-01-23 NOTE — TELEPHONE ENCOUNTER
----- Message from Janine Pate sent at 1/22/2018  3:26 PM CST -----  Contact: patient  Patient calling to speak with the Nurse about his annual physical. He wants the Nurse to speak with Dr Marino and get him in before 5/9/18. Please advise.  Call back   Thanks!

## 2018-01-25 ENCOUNTER — OFFICE VISIT (OUTPATIENT)
Dept: FAMILY MEDICINE | Facility: CLINIC | Age: 63
End: 2018-01-25
Payer: COMMERCIAL

## 2018-01-25 VITALS
WEIGHT: 280 LBS | TEMPERATURE: 99 F | HEIGHT: 69 IN | DIASTOLIC BLOOD PRESSURE: 79 MMHG | SYSTOLIC BLOOD PRESSURE: 135 MMHG | HEART RATE: 76 BPM | OXYGEN SATURATION: 96 % | BODY MASS INDEX: 41.47 KG/M2

## 2018-01-25 DIAGNOSIS — I10 ESSENTIAL HYPERTENSION: ICD-10-CM

## 2018-01-25 DIAGNOSIS — Z85.820 PERSONAL HISTORY OF MALIGNANT MELANOMA OF SKIN: ICD-10-CM

## 2018-01-25 DIAGNOSIS — Z79.899 HIGH RISK MEDICATION USE: ICD-10-CM

## 2018-01-25 DIAGNOSIS — M54.42 CHRONIC BILATERAL LOW BACK PAIN WITH BILATERAL SCIATICA: ICD-10-CM

## 2018-01-25 DIAGNOSIS — M54.41 CHRONIC BILATERAL LOW BACK PAIN WITH BILATERAL SCIATICA: ICD-10-CM

## 2018-01-25 DIAGNOSIS — E78.2 MIXED HYPERLIPIDEMIA: ICD-10-CM

## 2018-01-25 DIAGNOSIS — G89.29 CHRONIC BILATERAL LOW BACK PAIN WITH BILATERAL SCIATICA: ICD-10-CM

## 2018-01-25 DIAGNOSIS — Z00.00 ANNUAL PHYSICAL EXAM: Primary | ICD-10-CM

## 2018-01-25 DIAGNOSIS — E66.01 MORBID OBESITY WITH BMI OF 40.0-44.9, ADULT: ICD-10-CM

## 2018-01-25 PROCEDURE — 99396 PREV VISIT EST AGE 40-64: CPT | Mod: S$GLB,,, | Performed by: NURSE PRACTITIONER

## 2018-01-25 PROCEDURE — 99999 PR PBB SHADOW E&M-EST. PATIENT-LVL III: CPT | Mod: PBBFAC,,, | Performed by: NURSE PRACTITIONER

## 2018-01-25 NOTE — PROGRESS NOTES
Subjective:       Patient ID: Mal Larkin is a 62 y.o. male.    Chief Complaint: Annual Exam    HPI   Chief Complaint  Chief Complaint   Patient presents with    Annual Exam       HPI  Mal Larkin is a 62 y.o. male with medical diagnoses as listed in the medical history and problem list that presents for an annual exam.  Patient regularly treated for hypertension and blood pressure today is 135/79, digital hypertension medicine program.  BMI today is 41.36 and weight is down 3 pounds from previous visit to 280 pounds.  Patient was recently treated for bilateral lower lobe pneumonia diagnosed on 1/17/18. Also treated for hyperlipidemia. Patient has a personal history of melanoma on his back that was removed in 2011 and has an appointment with dermatology Dr. Martinez on 4/6/18.  Established patient with last clinic appointment 1/19/2018.        PAST MEDICAL HISTORY:  Past Medical History:   Diagnosis Date    Adenomatous colon polyp     + dysplasia    Colon polyp     Fatty liver     Hypertension     Melanoma 2011    in situ - upper back       PAST SURGICAL HISTORY:  Past Surgical History:   Procedure Laterality Date    APPENDECTOMY      BACK SURGERY      transverse process fracture    COLONOSCOPY  11/11/2013    Dr. Hayes, 3 year recheck    COLONOSCOPY N/A 12/20/2016    Procedure: COLONOSCOPY;  Surgeon: Atilio Hayes MD;  Location: [facility];  Service: Endoscopy;  Laterality: N/A;    HAND SURGERY      right dupuytrens release on 8/5/13    LUMBAR LAMINECTOMY  2011    melanoma removal  2011    WISDOM TOOTH EXTRACTION         SOCIAL HISTORY:  Social History     Social History    Marital status:      Spouse name: N/A    Number of children: N/A    Years of education: N/A     Occupational History     [employer]     Social History Main Topics    Smoking status: Former Smoker     Packs/day: 1.00     Years: 30.00     Types: Cigarettes     Quit date: 5/8/2014    Smokeless  tobacco: Former User     Types: Chew     Quit date: 9/22/1979    Alcohol use 6.0 oz/week     12 Standard drinks or equivalent per week      Comment: Drinks beer only and mostly during football season    Drug use: No    Sexual activity: Not on file     Other Topics Concern    Not on file     Social History Narrative    No narrative on file       FAMILY HISTORY:  Family History   Problem Relation Age of Onset    Skin cancer Mother     Ovarian cancer Mother     Heart disease Mother     Prostate cancer Father     Colon cancer Father     Cancer Father      glands    Melanoma Father     Melanoma Sister     Asthma Daughter     Fibromyalgia Daughter     Heart disease Maternal Uncle     Alcohol abuse Maternal Uncle     Cancer Paternal Aunt     Cerebral aneurysm Maternal Grandmother     Early death Maternal Grandmother      fall hit head    Heart disease Maternal Grandfather     Cancer Paternal Grandmother     Pancreatic cancer Paternal Grandmother     Cancer Sister      cervix, abd     Melanoma Sister     Skin cancer Sister     Vaginal cancer Sister     Macular degeneration Neg Hx     Retinal detachment Neg Hx     Glaucoma Neg Hx     Psoriasis Neg Hx     Lupus Neg Hx     Eczema Neg Hx        ALLERGIES AND MEDICATIONS: updated and reviewed.  Review of patient's allergies indicates:  No Known Allergies  Current Outpatient Prescriptions   Medication Sig Dispense Refill    atorvastatin (LIPITOR) 10 MG tablet TAKE 1 TABLET BY MOUTH DAILY 90 tablet 0    furosemide (LASIX) 20 MG tablet Take 1 tablet (20 mg total) by mouth once daily. 30 tablet 6    lisinopril (PRINIVIL,ZESTRIL) 40 MG tablet Take 1 tablet (40 mg total) by mouth once daily. 90 tablet 3     No current facility-administered medications for this visit.      Review of Systems   Constitutional: Negative for activity change, chills, fatigue, fever and unexpected weight change.   HENT: Negative for hearing loss, rhinorrhea and trouble  "swallowing.    Eyes: Negative for discharge and visual disturbance.        Wears glasses all the time, sees Dr. Tripp   Respiratory: Negative for cough, chest tightness, shortness of breath and wheezing.    Cardiovascular: Negative for chest pain, palpitations and leg swelling.   Gastrointestinal: Negative for abdominal pain, blood in stool, constipation, diarrhea and vomiting.   Endocrine: Negative for polydipsia and polyuria.   Genitourinary: Negative for difficulty urinating, hematuria and urgency.   Musculoskeletal: Positive for back pain. Negative for arthralgias, joint swelling and neck pain.        Chronic low back pain, has had a fractured tailbone   Skin: Negative for rash and wound.   Neurological: Negative for dizziness, weakness and headaches.   Psychiatric/Behavioral: Negative for confusion, dysphoric mood, sleep disturbance and suicidal ideas.       Objective:       Vitals:    01/25/18 1306   BP: 135/79   BP Location: Right arm   Patient Position: Sitting   BP Method: Large (Automatic)   Pulse: 76   Temp: 98.6 °F (37 °C)   TempSrc: Oral   SpO2: 96%   Weight: 127 kg (280 lb)   Height: 5' 9" (1.753 m)     Physical Exam   Constitutional: He is oriented to person, place, and time. Vital signs are normal. He appears well-developed. No distress.   HENT:   Head: Normocephalic and atraumatic.   Right Ear: Tympanic membrane, external ear and ear canal normal.   Left Ear: Tympanic membrane, external ear and ear canal normal.   Nose: Nose normal. No mucosal edema.   Mouth/Throat: Oropharynx is clear and moist and mucous membranes are normal. No oropharyngeal exudate.   Bilateral external auditory canals with some cerumen noted   Eyes: Conjunctivae, EOM and lids are normal. Pupils are equal, round, and reactive to light. Right eye exhibits no discharge. Left eye exhibits no discharge. Right conjunctiva is not injected. Left conjunctiva is not injected.   Neck: Normal range of motion. Neck supple. Normal carotid " pulses present. Carotid bruit is not present.   Cardiovascular: Normal rate, regular rhythm, S1 normal, S2 normal, normal heart sounds, intact distal pulses and normal pulses.    No murmur heard.  Pulses:       Carotid pulses are 2+ on the right side, and 2+ on the left side.       Radial pulses are 2+ on the right side, and 2+ on the left side.        Posterior tibial pulses are 2+ on the right side, and 2+ on the left side.   1 + pitting edema to medial aspects    Pulmonary/Chest: Effort normal and breath sounds normal. No respiratory distress. He has no decreased breath sounds. He has no wheezes. He has no rhonchi. He has no rales.   Abdominal: Soft. Bowel sounds are normal. He exhibits no distension, no abdominal bruit, no pulsatile midline mass and no mass. There is no hepatosplenomegaly. There is no tenderness. No hernia.   Musculoskeletal: Normal range of motion. He exhibits no edema, tenderness or deformity.   Patient does have limited ability to walk long distances due to low back pain that is chronic, has had 2 lumbar surgeries in past    Lymphadenopathy:     He has no cervical adenopathy.   Neurological: He is alert and oriented to person, place, and time. He has normal strength.   Skin: Skin is warm, dry and intact. Capillary refill takes less than 2 seconds. No rash noted. He is not diaphoretic. No pallor.   Psychiatric: He has a normal mood and affect. His speech is normal and behavior is normal. Judgment and thought content normal. His mood appears not anxious. Cognition and memory are normal. He does not exhibit a depressed mood.   Nursing note and vitals reviewed.      Assessment:       1. Annual physical exam    2. Essential hypertension    3. Personal history of malignant melanoma of skin    4. Morbid obesity with BMI of 40.0-44.9, adult    5. Mixed hyperlipidemia    6. High risk medication use    7. Chronic bilateral low back pain with bilateral sciatica        Plan:   Mal was seen today for  annual exam.  Discussed healthy diet, regular exercise, necessary labs, age appropriate cancer screening, and routine vaccinations.    Diagnoses and all orders for this visit:    Annual physical exam  -     Comprehensive metabolic panel; Future  -     Lipid panel; Future  -     PSA, Screening; Future  - Discussed healthy diet, regular exercise, necessary labs, age appropriate cancer screening, and routine vaccinations.    - Educational handouts provided. Preventive guidelines men age 50-64  - Patient has a diagnosis of fatty liver disease which he disputes, provided with copy of [place] 6/22/2005 that shows mild diffuse fatty liver infiltration, patient refuses to acknowledge diagnosis    Essential hypertension  -     Comprehensive metabolic panel; Future  - Controlled on current medication 135/79, no changes needed    Personal history of malignant melanoma of skin   Continue yearly follow up with dermatology, scheduled 4/6/18  Morbid obesity with BMI of 40.0-44.9, adult   Weight loss recommended with well balanced diet and portion controlled meals and increased activity.   Mixed hyperlipidemia  -     Lipid panel; Future  - LDL 89.4 on 1/19/17, continue lipitor 10 mg daily    High risk medication use  -     Comprehensive metabolic panel; Future    Chronic bilateral low back pain with bilateral sciatica    Would like to get a handicap tag for his vehicle   Difficulty walking long distances without resting due to back pain    Follow-up in about 1 year (around 1/25/2019) for Annual exam.     Patient readiness: acceptance and barriers:none and readiness    During the course of the visit the patient was educated and counseled about the following:     Hypertension:   Medication: no change.  Dietary sodium restriction.  Regular aerobic exercise.  Obesity:   General weight loss/lifestyle modification strategies discussed (elicit support from others; identify saboteurs; non-food rewards, etc).  Informal exercise measures  discussed, e.g. taking stairs instead of elevator.  Regular aerobic exercise program discussed.    Goals: Hypertension: Reduce Blood Pressure and Obesity: Reduce calorie intake and BMI    Did patient meet goals/outcomes: Yes for hypertension and No for obesity    The following self management tools provided: declined    Patient Instructions (the written plan) was given to the patient/family.     Time spent with patient: 30 minutes    Barriers to medications present (no )    Adverse reactions to current medications (no)    Over the counter medications reviewed (Yes)

## 2018-01-26 ENCOUNTER — PATIENT MESSAGE (OUTPATIENT)
Dept: FAMILY MEDICINE | Facility: CLINIC | Age: 63
End: 2018-01-26

## 2018-01-26 ENCOUNTER — LAB VISIT (OUTPATIENT)
Dept: LAB | Facility: HOSPITAL | Age: 63
End: 2018-01-26
Attending: NURSE PRACTITIONER
Payer: COMMERCIAL

## 2018-01-26 DIAGNOSIS — E78.2 MIXED HYPERLIPIDEMIA: ICD-10-CM

## 2018-01-26 DIAGNOSIS — Z00.00 ANNUAL PHYSICAL EXAM: ICD-10-CM

## 2018-01-26 DIAGNOSIS — Z79.899 HIGH RISK MEDICATION USE: ICD-10-CM

## 2018-01-26 DIAGNOSIS — I10 ESSENTIAL HYPERTENSION: ICD-10-CM

## 2018-01-26 LAB
ALBUMIN SERPL BCP-MCNC: 3.7 G/DL
ALP SERPL-CCNC: 75 U/L
ALT SERPL W/O P-5'-P-CCNC: 29 U/L
ANION GAP SERPL CALC-SCNC: 12 MMOL/L
AST SERPL-CCNC: 24 U/L
BILIRUB SERPL-MCNC: 0.6 MG/DL
BUN SERPL-MCNC: 12 MG/DL
CALCIUM SERPL-MCNC: 9.5 MG/DL
CHLORIDE SERPL-SCNC: 96 MMOL/L
CHOLEST SERPL-MCNC: 192 MG/DL
CHOLEST/HDLC SERPL: 2.6 {RATIO}
CO2 SERPL-SCNC: 25 MMOL/L
COMPLEXED PSA SERPL-MCNC: 0.43 NG/ML
CREAT SERPL-MCNC: 0.8 MG/DL
EST. GFR  (AFRICAN AMERICAN): >60 ML/MIN/1.73 M^2
EST. GFR  (NON AFRICAN AMERICAN): >60 ML/MIN/1.73 M^2
GLUCOSE SERPL-MCNC: 101 MG/DL
HDLC SERPL-MCNC: 73 MG/DL
HDLC SERPL: 38 %
LDLC SERPL CALC-MCNC: 102.4 MG/DL
NONHDLC SERPL-MCNC: 119 MG/DL
POTASSIUM SERPL-SCNC: 4.7 MMOL/L
PROT SERPL-MCNC: 7.2 G/DL
SODIUM SERPL-SCNC: 133 MMOL/L
TRIGL SERPL-MCNC: 83 MG/DL

## 2018-01-26 PROCEDURE — 84153 ASSAY OF PSA TOTAL: CPT

## 2018-01-26 PROCEDURE — 80053 COMPREHEN METABOLIC PANEL: CPT

## 2018-01-26 PROCEDURE — 36415 COLL VENOUS BLD VENIPUNCTURE: CPT | Mod: PO

## 2018-01-26 PROCEDURE — 80061 LIPID PANEL: CPT

## 2018-01-29 ENCOUNTER — PATIENT MESSAGE (OUTPATIENT)
Dept: ADMINISTRATIVE | Facility: OTHER | Age: 63
End: 2018-01-29

## 2018-01-31 ENCOUNTER — TELEPHONE (OUTPATIENT)
Dept: FAMILY MEDICINE | Facility: CLINIC | Age: 63
End: 2018-01-31

## 2018-01-31 NOTE — TELEPHONE ENCOUNTER
Spoke with patient and confirmed his appointment with Marie Calderon for 7:00 am on Friday 2/2. Patient stated he would like to keep this appointment for pneumonia follow up. Patient was contacted because he recently seen Yoselin Jenkins on 1/25/18.

## 2018-02-02 ENCOUNTER — PATIENT OUTREACH (OUTPATIENT)
Dept: OTHER | Facility: OTHER | Age: 63
End: 2018-02-02

## 2018-02-02 ENCOUNTER — OFFICE VISIT (OUTPATIENT)
Dept: FAMILY MEDICINE | Facility: CLINIC | Age: 63
End: 2018-02-02
Payer: COMMERCIAL

## 2018-02-02 VITALS
BODY MASS INDEX: 41.76 KG/M2 | HEIGHT: 69 IN | HEART RATE: 86 BPM | WEIGHT: 281.94 LBS | DIASTOLIC BLOOD PRESSURE: 88 MMHG | SYSTOLIC BLOOD PRESSURE: 136 MMHG | TEMPERATURE: 99 F

## 2018-02-02 DIAGNOSIS — I10 ESSENTIAL HYPERTENSION: Primary | ICD-10-CM

## 2018-02-02 DIAGNOSIS — Z23 NEED FOR INFLUENZA VACCINATION: ICD-10-CM

## 2018-02-02 DIAGNOSIS — J18.9 PNEUMONIA DUE TO INFECTIOUS ORGANISM, UNSPECIFIED LATERALITY, UNSPECIFIED PART OF LUNG: Primary | ICD-10-CM

## 2018-02-02 DIAGNOSIS — I10 ESSENTIAL HYPERTENSION: ICD-10-CM

## 2018-02-02 PROCEDURE — 99999 PR PBB SHADOW E&M-EST. PATIENT-LVL III: CPT | Mod: PBBFAC,,, | Performed by: PHYSICIAN ASSISTANT

## 2018-02-02 PROCEDURE — 90686 IIV4 VACC NO PRSV 0.5 ML IM: CPT | Mod: S$GLB,,, | Performed by: FAMILY MEDICINE

## 2018-02-02 PROCEDURE — 3008F BODY MASS INDEX DOCD: CPT | Mod: S$GLB,,, | Performed by: PHYSICIAN ASSISTANT

## 2018-02-02 PROCEDURE — 99213 OFFICE O/P EST LOW 20 MIN: CPT | Mod: 25,S$GLB,, | Performed by: PHYSICIAN ASSISTANT

## 2018-02-02 PROCEDURE — 90471 IMMUNIZATION ADMIN: CPT | Mod: S$GLB,,, | Performed by: FAMILY MEDICINE

## 2018-02-02 NOTE — PROGRESS NOTES
Last 5 Patient Entered Readings                                      Current 30 Day Average: 139/82     Recent Readings 3/20/2018 3/20/2018 3/20/2018 3/16/2018 3/11/2018    SBP (mmHg) 144 146 147 136 141    DBP (mmHg) 89 91 95 84 80    Pulse 72 65 66 75 67        Mr. Larkin's BP has been higher lately. He does not feel he is eating salt in excess of 2000 mg/day. He does note swelling in his RLE and feels it is due to lack of movement. He is in a lot of pain so he has not been very active. He is looking to have bariatric surgery scheduled in April and then possibly a spinal fusion after he loses weight. Will restart amlodipine 5 mg QD.    Patient's BP average is above goal of <130/80.     Will continue to monitor regularly. Will follow up in 2-3 weeks, sooner if BP begins to trend upward or downward.    Patient has my contact information and knows to call with any concerns or clinical changes.     Current HTN regimen:  Hypertension Medications             amLODIPine (NORVASC) 5 MG tablet Take 1 tablet (5 mg total) by mouth once daily.    furosemide (LASIX) 20 MG tablet Take 1 tablet (20 mg total) by mouth once daily.    lisinopril (PRINIVIL,ZESTRIL) 40 MG tablet Take 1 tablet (40 mg total) by mouth once daily.

## 2018-02-02 NOTE — PROGRESS NOTES
Subjective:       Patient ID: Mal Larkin is a 62 y.o. male.    Chief Complaint: follow up pneumonia (patient states feeling much better)    Mr. Larkin comes to clinic today for 10 day follow up of pneumonia. He is feeling much better. He has no complaints today. The patient is due for a flu shot today. He agrees to receive this.       Review of Systems   Constitutional: Negative for activity change and unexpected weight change.   HENT: Negative for hearing loss, rhinorrhea and trouble swallowing.    Eyes: Negative for discharge and visual disturbance.   Respiratory: Negative for chest tightness and wheezing.    Cardiovascular: Negative for chest pain and palpitations.   Gastrointestinal: Negative for blood in stool, constipation, diarrhea and vomiting.   Endocrine: Negative for polydipsia and polyuria.   Genitourinary: Negative for difficulty urinating, hematuria and urgency.   Musculoskeletal: Negative for arthralgias, joint swelling and neck pain.   Neurological: Negative for weakness and headaches.   Psychiatric/Behavioral: Negative for confusion and dysphoric mood.       Objective:      Physical Exam   Constitutional: He is oriented to person, place, and time.   HENT:   Mouth/Throat: Oropharynx is clear and moist. No oropharyngeal exudate.   Eyes: Conjunctivae are normal. Pupils are equal, round, and reactive to light.   Cardiovascular: Normal rate and regular rhythm.    Pulmonary/Chest: Effort normal and breath sounds normal. He has no wheezes.   Abdominal: Soft. Bowel sounds are normal. He exhibits no distension. There is no tenderness.   Musculoskeletal: He exhibits no edema.   Lymphadenopathy:     He has no cervical adenopathy.   Neurological: He is alert and oriented to person, place, and time.   Skin: No erythema.   Psychiatric: His behavior is normal.       Assessment:       1. Pneumonia due to infectious organism, unspecified laterality, unspecified part of lung    2. Essential hypertension    3.  Need for influenza vaccination        Plan:   Mal was seen today for follow up pneumonia.    Diagnoses and all orders for this visit:    Pneumonia due to infectious organism, unspecified laterality, unspecified part of lung  -     X-Ray Chest PA And Lateral; Future  Treatment completed  Essential hypertension  Well controlled  Low salt diet   Continue current medication  Need for influenza vaccination  Flu shot given  Other orders  -     Influenza - Quadrivalent (3 years & older) (PF)

## 2018-02-02 NOTE — PATIENT INSTRUCTIONS
Weight Management: Getting Started  Healthy bodies come in all shapes and sizes. Not all bodies are made to be thin. For some people, a healthy weight is higher than the average weight listed on weight charts. Your healthcare provider can help you decide on a healthy weight for you.    Reasons to lose weight  Losing weight can help with some health problems, such as high blood pressure, heart disease, diabetes, sleep apnea, and arthritis. You may also feel more energy.  Set your long-term goal  Your goal doesn't even have to be a specific weight. You may decide on a fitness goal (such as being able to walk 10 miles a week), or a health goal (such as lowering your blood pressure). Choose a goal that is measurable and reasonable, so you know when you've reached it. A goal of reaching a BMI of less than 25 is not always reasonable (or possible).   Make an action plan  Habits dont change overnight. Setting your goals too high can leave you feeling discouraged if you cant reach them. Be realistic. Choose one or two small changes you can make now. Set an action plan for how you are going to make these changes. When you can stick to this plan, keep making a few more small changes. Taking small steps will help you stay on the path to success.  Track your progress  Write down your goals. Then, keep a daily record of your progress. Write down what you eat and how active you are. This record lets you look back on how much youve done. It may also help when youre feeling frustrated. Reward yourself for success. Even if you dont reach every goal, give yourself credit for what you do get done.  Get support  Encouragement from others can help make losing weight easier. Ask your family members and friends for support. They may even want to join you. Also look to your healthcare provider, registered dietitian, and  for help. Your local hospital can give you more information about nutrition, exercise, and  weight loss.  Date Last Reviewed: 1/31/2016 © 2000-2017 [employer]. [address] [place] [address], [place] [address]. All rights reserved. This information is not intended as a substitute for professional medical care. Always follow your healthcare professional's instructions.        Walking for Fitness  Fitness walking has something for everyone, even people who are already fit. Walking is one of the safest ways to condition your body aerobically. It can boost energy, help you lose weight, and reduce stress.    Physical benefits  · Walking strengthens your heart and lungs, and tones your muscles.  · When walking, your feet land with less impact than in other sports. This reduces chances of muscle, bone, and joint injury.  · Regular walking improves your cholesterol levels and lowers your risk of heart disease. And it helps you control your blood sugar if you have diabetes.  · Walking is a weight-bearing activity, which helps maintain bone density. This can help prevent osteoporosis.  Personal rewards  · Taking walks can help you relax and manage stress. And fitness walking may make you feel better about yourself.  · Walking can help you sleep better at night and make you less likely to be depressed.  · Regular walking may help maintain your memory as you get older.  · Walking is a great way to spend extra time with friends and family members. Be sure to invite your dog along!  Q&A about fitness walking  Q: Will walking keep me fit?  A: Yes. Regular walking at the right pace gives you all the benefits of other aerobic activities, such as jogging and swimming.  Q: Will walking help me lose weight and keep it off?  A: Yes. Per mile, walking can burn as many calories as jogging. Your health care provider can help work walking into your weight-loss plan.  Q: Is walking safe for my health?  A: Yes. Walking is safe if you have high blood pressure, diabetes, heart disease, or other conditions. Talk to your  healthcare provider before you start.  Date Last Reviewed: 4/1/2017  © 2682-7292 [employer]. [address] [place] [address], [place] [address]. All rights reserved. This information is not intended as a substitute for professional medical care. Always follow your healthcare professional's instructions.        Controlling High Blood Pressure  High blood pressure (hypertension) is often called the silent killer. This is because many people who have it dont know it. High blood pressure is defined as 140/90 mm Hg or higher. Know your blood pressure and remember to check it regularly. Doing so can save your life. Here are some things you can do to help control your blood pressure.    Choose heart-healthy foods  · Select low-salt, low-fat foods. Limit sodium intake to 2,400 mg per day or the amount suggested by your healthcare provider.  · Limit canned, dried, cured, packaged, and fast foods. These can contain a lot of salt.  · Eat 8 to 10 servings of fruits and vegetables every day.  · Choose lean meats, fish, or chicken.  · Eat whole-grain pasta, brown rice, and beans.  · Eat 2 to 3 servings of low-fat or fat-free dairy products.  · Ask your doctor about the DASH eating plan. This plan helps reduce blood pressure.  · When you go to a restaurant, ask that your meal be prepared with no added salt.  Maintain a healthy weight  · Ask your healthcare provider how many calories to eat a day. Then stick to that number.  · Ask your healthcare provider what weight range is healthiest for you. If you are overweight, a weight loss of only 3% to 5% of your body weight can help lower blood pressure. Generally, a good weight loss goal is to lose 10% of your body weight in a year.  · Limit snacks and sweets.  · Get regular exercise.  Get up and get active  · Choose activities you enjoy. Find ones you can do with friends or family. This includes bicycling, dancing, walking, and jogging.  · Park farther away from building  entrances.  · Use stairs instead of the elevator.  · When you can, walk or bike instead of driving.  · [place] leaves, garden, or do household repairs.  · Be active at a moderate to vigorous level of physical activity for at least 40 minutes for a minimum of 3 to 4 days a week.   Manage stress  · Make time to relax and enjoy life. Find time to laugh.  · Communicate your concerns with your loved ones and your healthcare provider.  · Visit with family and friends, and keep up with hobbies.  Limit alcohol and quit smoking  · Men should have no more than 2 drinks per day.  · Women should have no more than 1 drink per day.  · Talk with your healthcare provider about quitting smoking. Smoking significantly increases your risk for heart disease and stroke. Ask your healthcare provider about community smoking cessation programs and other options.  Medicines  If lifestyle changes arent enough, your healthcare provider may prescribe high blood pressure medicine. Take all medicines as prescribed. If you have any questions about your medicines, ask your healthcare provider before stopping or changing them.   Date Last Reviewed: 4/27/2016 © 2000-2017 [employer]. [address] [place] [id number]. All rights reserved. This information is not intended as a substitute for professional medical care. Always follow your healthcare professional's instructions.        Weight Management: Getting Started  Healthy bodies come in all shapes and sizes. Not all bodies are made to be thin. For some people, a healthy weight is higher than the average weight listed on weight charts. Your healthcare provider can help you decide on a healthy weight for you.    Reasons to lose weight  Losing weight can help with some health problems, such as high blood pressure, heart disease, diabetes, sleep apnea, and arthritis. You may also feel more energy.  Set your long-term goal  Your goal doesn't even have to be a specific weight. You  may decide on a fitness goal (such as being able to walk 10 miles a week), or a health goal (such as lowering your blood pressure). Choose a goal that is measurable and reasonable, so you know when you've reached it. A goal of reaching a BMI of less than 25 is not always reasonable (or possible).   Make an action plan  Habits dont change overnight. Setting your goals too high can leave you feeling discouraged if you cant reach them. Be realistic. Choose one or two small changes you can make now. Set an action plan for how you are going to make these changes. When you can stick to this plan, keep making a few more small changes. Taking small steps will help you stay on the path to success.  Track your progress  Write down your goals. Then, keep a daily record of your progress. Write down what you eat and how active you are. This record lets you look back on how much youve done. It may also help when youre feeling frustrated. Reward yourself for success. Even if you dont reach every goal, give yourself credit for what you do get done.  Get support  Encouragement from others can help make losing weight easier. Ask your family members and friends for support. They may even want to join you. Also look to your healthcare provider, registered dietitian, and  for help. Your local hospital can give you more information about nutrition, exercise, and weight loss.  Date Last Reviewed: 1/31/2016 © 2000-2017 [employer]. [address] [place] [address], [place] [address]. All rights reserved. This information is not intended as a substitute for professional medical care. Always follow your healthcare professional's instructions.        Walking for Fitness  Fitness walking has something for everyone, even people who are already fit. Walking is one of the safest ways to condition your body aerobically. It can boost energy, help you lose weight, and reduce stress.    Physical benefits  · Walking  strengthens your heart and lungs, and tones your muscles.  · When walking, your feet land with less impact than in other sports. This reduces chances of muscle, bone, and joint injury.  · Regular walking improves your cholesterol levels and lowers your risk of heart disease. And it helps you control your blood sugar if you have diabetes.  · Walking is a weight-bearing activity, which helps maintain bone density. This can help prevent osteoporosis.  Personal rewards  · Taking walks can help you relax and manage stress. And fitness walking may make you feel better about yourself.  · Walking can help you sleep better at night and make you less likely to be depressed.  · Regular walking may help maintain your memory as you get older.  · Walking is a great way to spend extra time with friends and family members. Be sure to invite your dog along!  Q&A about fitness walking  Q: Will walking keep me fit?  A: Yes. Regular walking at the right pace gives you all the benefits of other aerobic activities, such as jogging and swimming.  Q: Will walking help me lose weight and keep it off?  A: Yes. Per mile, walking can burn as many calories as jogging. Your health care provider can help work walking into your weight-loss plan.  Q: Is walking safe for my health?  A: Yes. Walking is safe if you have high blood pressure, diabetes, heart disease, or other conditions. Talk to your healthcare provider before you start.  Date Last Reviewed: 4/1/2017  © 7522-8247 [employer]. [address] [place] [address], [place] [address]. All rights reserved. This information is not intended as a substitute for professional medical care. Always follow your healthcare professional's instructions.

## 2018-02-06 ENCOUNTER — PATIENT OUTREACH (OUTPATIENT)
Dept: OTHER | Facility: OTHER | Age: 63
End: 2018-02-06

## 2018-02-10 ENCOUNTER — PATIENT MESSAGE (OUTPATIENT)
Dept: FAMILY MEDICINE | Facility: CLINIC | Age: 63
End: 2018-02-10

## 2018-02-19 ENCOUNTER — HOSPITAL ENCOUNTER (OUTPATIENT)
Dept: RADIOLOGY | Facility: CLINIC | Age: 63
Discharge: HOME | End: 2018-02-19
Attending: PHYSICIAN ASSISTANT
Payer: COMMERCIAL

## 2018-02-19 DIAGNOSIS — J18.9 PNEUMONIA DUE TO INFECTIOUS ORGANISM, UNSPECIFIED LATERALITY, UNSPECIFIED PART OF LUNG: ICD-10-CM

## 2018-02-19 PROCEDURE — 71046 X-RAY EXAM CHEST 2 VIEWS: CPT | Mod: 26,,, | Performed by: RADIOLOGY

## 2018-02-19 PROCEDURE — 71046 X-RAY EXAM CHEST 2 VIEWS: CPT | Mod: TC,FY,PO

## 2018-03-19 ENCOUNTER — PATIENT MESSAGE (OUTPATIENT)
Dept: FAMILY MEDICINE | Facility: CLINIC | Age: 63
End: 2018-03-19

## 2018-03-20 ENCOUNTER — DOCUMENTATION ONLY (OUTPATIENT)
Dept: FAMILY MEDICINE | Facility: CLINIC | Age: 63
End: 2018-03-20

## 2018-03-20 NOTE — PROGRESS NOTES
Pre-Visit Chart Review  For Appointment Scheduled on 3-29-18    Health Maintenance Due   Topic Date Due    TETANUS VACCINE  07[birth year]23/1973    Zoster Vaccine  07/23/2015

## 2018-03-22 ENCOUNTER — DOCUMENTATION ONLY (OUTPATIENT)
Dept: FAMILY MEDICINE | Facility: CLINIC | Age: 63
End: 2018-03-22

## 2018-03-22 NOTE — PROGRESS NOTES
Pre-Visit Chart Review  For Appointment Scheduled on 4-10-18    Health Maintenance Due   Topic Date Due    TETANUS VACCINE  07/23/1973    Zoster Vaccine  07/23/2015

## 2018-03-23 DIAGNOSIS — E78.5 HYPERLIPIDEMIA: ICD-10-CM

## 2018-03-23 RX ORDER — ATORVASTATIN CALCIUM 10 MG/1
TABLET, FILM COATED ORAL
Qty: 90 TABLET | Refills: 3 | Status: SHIPPED | OUTPATIENT
Start: 2018-03-23 | End: 2018-08-02

## 2018-03-26 RX ORDER — AMLODIPINE BESYLATE 5 MG/1
5 TABLET ORAL DAILY
Qty: 30 TABLET | Refills: 3 | Status: SHIPPED | OUTPATIENT
Start: 2018-03-26 | End: 2018-08-02

## 2018-04-06 ENCOUNTER — OFFICE VISIT (OUTPATIENT)
Dept: DERMATOLOGY | Facility: CLINIC | Age: 63
End: 2018-04-06
Payer: COMMERCIAL

## 2018-04-06 VITALS — WEIGHT: 281 LBS | HEIGHT: 69 IN | BODY MASS INDEX: 41.62 KG/M2

## 2018-04-06 DIAGNOSIS — L82.0 INFLAMED SEBORRHEIC KERATOSIS: ICD-10-CM

## 2018-04-06 DIAGNOSIS — L82.1 SEBORRHEIC KERATOSES: ICD-10-CM

## 2018-04-06 DIAGNOSIS — D22.9 MULTIPLE BENIGN NEVI: ICD-10-CM

## 2018-04-06 DIAGNOSIS — Z85.820 HISTORY OF MELANOMA: Primary | ICD-10-CM

## 2018-04-06 DIAGNOSIS — L81.4 SOLAR LENTIGO: ICD-10-CM

## 2018-04-06 PROCEDURE — 17110 DESTRUCTION B9 LES UP TO 14: CPT | Mod: S$GLB,,, | Performed by: DERMATOLOGY

## 2018-04-06 PROCEDURE — 99999 PR PBB SHADOW E&M-EST. PATIENT-LVL II: CPT | Mod: PBBFAC,,, | Performed by: DERMATOLOGY

## 2018-04-06 PROCEDURE — 99214 OFFICE O/P EST MOD 30 MIN: CPT | Mod: 25,S$GLB,, | Performed by: DERMATOLOGY

## 2018-04-06 NOTE — PROGRESS NOTES
Subjective:       Patient ID:  Mal Larkin is a 62 y.o. male who presents for   Chief Complaint   Patient presents with    Skin Check     TBSE     Patient last seen 5/2017 with AKs treated with cryo and bening lesions    H/o MIS upper back 2011  + FH melanoma in father and both sisters  This is a high risk patient here to check for the development of new lesions.          Review of Systems   Constitutional: Negative for fever, chills, weight loss, fatigue, night sweats and malaise.   HENT: Negative for headaches.    Respiratory: Negative for cough and shortness of breath.    Gastrointestinal: Negative for indigestion.   Skin: Negative for daily sunscreen use, activity-related sunscreen use, recent sunburn and wears hat.   Neurological: Negative for headaches.   Hematologic/Lymphatic: Negative for adenopathy. Bruises/bleeds easily.        Objective:    Physical Exam   Constitutional: He appears well-developed and well-nourished. He is obese.  No distress.   Neurological: He is alert and oriented to person, place, and time. He is not disoriented.   Psychiatric: He has a normal mood and affect.   Skin:   Areas Examined (abnormalities noted in diagram):   Scalp / Hair Palpated and Inspected  Head / Face Inspection Performed  Neck Inspection Performed  Chest / Axilla Inspection Performed  Abdomen Inspection Performed  Genitals / Buttocks / Groin Inspection Performed  Back Inspection Performed  RUE Inspected  LUE Inspection Performed  RLE Inspected  LLE Inspection Performed  Nails and Digits Inspection Performed                       Diagram Legend     Erythematous scaling macule/papule c/w actinic keratosis       Vascular papule c/w angioma      Pigmented verrucoid papule/plaque c/w seborrheic keratosis      Yellow umbilicated papule c/w sebaceous hyperplasia      Irregularly shaped tan macule c/w lentigo     1-2 mm smooth white papules consistent with Milia      Movable subcutaneous cyst with punctum c/w  epidermal inclusion cyst      Subcutaneous movable cyst c/w pilar cyst      Firm pink to brown papule c/w dermatofibroma      Pedunculated fleshy papule(s) c/w skin tag(s)      Evenly pigmented macule c/w junctional nevus     Mildly variegated pigmented, slightly irregular-bordered macule c/w mildly atypical nevus      Flesh colored to evenly pigmented papule c/w intradermal nevus       Pink pearly papule/plaque c/w basal cell carcinoma      Erythematous hyperkeratotic cursted plaque c/w SCC      Surgical scar with no sign of skin cancer recurrence      Open and closed comedones      Inflammatory papules and pustules      Verrucoid papule consistent consistent with wart     Erythematous eczematous patches and plaques     Dystrophic onycholytic nail with subungual debris c/w onychomycosis     Umbilicated papule    Erythematous-base heme-crusted tan verrucoid plaque consistent with inflamed seborrheic keratosis     Erythematous Silvery Scaling Plaque c/w Psoriasis     See annotation      Assessment / Plan:        History of melanoma  Area of previous melanoma examined. Site well healed with no signs of recurrence.  Total body skin examination performed today including at least 12 points as noted in physical examination. No lesions suspicious for malignancy noted.    No axillary LAD    Inflamed seborrheic keratosis,  [place]  Cryosurgery procedure note:    Verbal consent from the patient is obtained. Liquid nitrogen cryosurgery is applied to 1 lesions to produce a freeze injury. The patient is aware that blisters may form and is instructed on wound care with gentle cleansing and use of vaseline ointment to keep moist until healed. The patient is supplied a handout on cryosurgery and is instructed to call if lesions do not completely resolve.    Seborrheic keratoses  These are benign inherited growths without a malignant potential. Reassurance given to patient. No treatment is necessary.     Solar lentigo  This is a  benign hyperpigmented sun induced lesion. Daily sun protection will reduce the number of new lesions. Treatment of these benign lesions are considered cosmetic.    Multiple benign nevi  Monitor for new mole or moles that are becoming bigger, darker, irritated, or developing irregular borders.     Patient instructed in importance in daily sun protection of at least spf 30. Sun avoidance and topical protection discussed.   Recommend Elta MD (physician office) COTZ sensitive (available online) for daily use on face and neck.  Patient encouraged to wear hat for all outdoor exposure.   Also discussed sun protective clothing.               Follow-up in about 1 year (around 4/6/2019).

## 2018-04-09 ENCOUNTER — PATIENT OUTREACH (OUTPATIENT)
Dept: OTHER | Facility: OTHER | Age: 63
End: 2018-04-09

## 2018-04-09 NOTE — PROGRESS NOTES
Last 5 Patient Entered Readings                                      Current 30 Day Average: 136/80     Recent Readings 4/8/2018 3/31/2018 3/31/2018 3/29/2018 3/27/2018    SBP (mmHg) 139 140 151 124 126    DBP (mmHg) 81 84 89 70 73    Pulse 67 65 67 85 77        Patient's BP average is above goal of <130/80.     Mr. Larkin's BP is improving with amlodipine. He has no side effects. He is almost done with appointments for clearance for bariatric surgery. Will follow up after his procedure. He expects it to happen end of April/early May.     Patient denies s/s of hypertension (SOB, CP, severe headaches, changes in vision) associated with high readings. Instructed patient to go to the ED if BP > 180/110 and accompanied by hypertensive s/s, patient confirms understanding.    Will continue to monitor regularly. Will follow up in 4-6 weeks, sooner if BP begins to trend upward or downward.    Patient has my contact information and knows to call with any concerns or clinical changes.     Current HTN regimen:  Hypertension Medications             amLODIPine (NORVASC) 5 MG tablet Take 1 tablet (5 mg total) by mouth once daily.    furosemide (LASIX) 20 MG tablet Take 1 tablet (20 mg total) by mouth once daily.    lisinopril (PRINIVIL,ZESTRIL) 40 MG tablet Take 1 tablet (40 mg total) by mouth once daily.

## 2018-04-10 ENCOUNTER — TELEPHONE (OUTPATIENT)
Dept: FAMILY MEDICINE | Facility: CLINIC | Age: 63
End: 2018-04-10

## 2018-04-10 ENCOUNTER — OFFICE VISIT (OUTPATIENT)
Dept: FAMILY MEDICINE | Facility: CLINIC | Age: 63
End: 2018-04-10
Attending: FAMILY MEDICINE
Payer: COMMERCIAL

## 2018-04-10 ENCOUNTER — HOSPITAL ENCOUNTER (OUTPATIENT)
Dept: RADIOLOGY | Facility: CLINIC | Age: 63
Discharge: HOME | End: 2018-04-10
Attending: FAMILY MEDICINE
Payer: COMMERCIAL

## 2018-04-10 VITALS
DIASTOLIC BLOOD PRESSURE: 70 MMHG | RESPIRATION RATE: 12 BRPM | HEIGHT: 69 IN | SYSTOLIC BLOOD PRESSURE: 118 MMHG | WEIGHT: 287.25 LBS | OXYGEN SATURATION: 97 % | BODY MASS INDEX: 42.54 KG/M2 | TEMPERATURE: 98 F | HEART RATE: 78 BPM

## 2018-04-10 DIAGNOSIS — E78.5 HYPERLIPIDEMIA, UNSPECIFIED HYPERLIPIDEMIA TYPE: ICD-10-CM

## 2018-04-10 DIAGNOSIS — Z01.818 PREOP EXAMINATION: ICD-10-CM

## 2018-04-10 DIAGNOSIS — K76.0 NAFLD (NONALCOHOLIC FATTY LIVER DISEASE): ICD-10-CM

## 2018-04-10 DIAGNOSIS — I10 GOOD HYPERTENSION CONTROL: ICD-10-CM

## 2018-04-10 DIAGNOSIS — Z86.010 HISTORY OF COLON POLYPS: ICD-10-CM

## 2018-04-10 DIAGNOSIS — H26.9 CATARACT OF RIGHT EYE, UNSPECIFIED CATARACT TYPE: ICD-10-CM

## 2018-04-10 DIAGNOSIS — Z85.820 HISTORY OF MALIGNANT MELANOMA OF SKIN: ICD-10-CM

## 2018-04-10 DIAGNOSIS — E66.01 MORBID OBESITY WITH BMI OF 40.0-44.9, ADULT: Primary | ICD-10-CM

## 2018-04-10 PROCEDURE — 99214 OFFICE O/P EST MOD 30 MIN: CPT | Mod: S$GLB,,, | Performed by: FAMILY MEDICINE

## 2018-04-10 PROCEDURE — 71046 X-RAY EXAM CHEST 2 VIEWS: CPT | Mod: TC,FY,PO

## 2018-04-10 PROCEDURE — 3078F DIAST BP <80 MM HG: CPT | Mod: CPTII,S$GLB,, | Performed by: FAMILY MEDICINE

## 2018-04-10 PROCEDURE — 93000 ELECTROCARDIOGRAM COMPLETE: CPT | Mod: S$GLB,,, | Performed by: INTERNAL MEDICINE

## 2018-04-10 PROCEDURE — 3074F SYST BP LT 130 MM HG: CPT | Mod: CPTII,S$GLB,, | Performed by: FAMILY MEDICINE

## 2018-04-10 PROCEDURE — 99999 PR PBB SHADOW E&M-EST. PATIENT-LVL III: CPT | Mod: PBBFAC,,, | Performed by: FAMILY MEDICINE

## 2018-04-10 PROCEDURE — 71046 X-RAY EXAM CHEST 2 VIEWS: CPT | Mod: 26,,, | Performed by: RADIOLOGY

## 2018-04-10 RX ORDER — MULTIVITAMIN
1 TABLET ORAL DAILY
COMMUNITY

## 2018-04-10 NOTE — TELEPHONE ENCOUNTER
----- Message from Laila Ballard sent at 4/9/2018 10:03 AM CDT -----  Type:  Patient Returning Call      Who Left Message for Patient:  No VM left  Does the patient know what this is regarding?: No  Best Call Back Number:  [phone] (home)

## 2018-04-10 NOTE — PATIENT INSTRUCTIONS

## 2018-04-10 NOTE — PROGRESS NOTES
Subjective:       Patient ID: Mal Larkin is a 62 y.o. male.    Chief Complaint: Pre-op Exam (Dr Sneed)    62-year-old male plans to have bariatric surgery with Dr. Sneed in the near future, date not yet set pending clearance.  He is here for preoperative clearance.  He has a large number of labs ordered that he will be doing tomorrow when he comes in fasting.  He was treated for pneumonia in late December and has no symptoms at this time.  His prior chest x-rays to old for preop clearance and he needs an EKG as well.  He has no symptoms at this time.  History includes hypertension, melanoma removal from the back, adenomatous colon polyps, nonalcoholic fatty liver and hyperlipidemia.  He is currently on amlodipine, Lipitor, Lasix, and lisinopril.    Past Medical History:  No date: Adenomatous colon polyp      Comment: + dysplasia  No date: Colon polyp  No date: Fatty liver  No date: Hypertension  2011: Melanoma      Comment: in situ - upper back    Past Surgical History:  No date: APPENDECTOMY  No date: BACK SURGERY      Comment: transverse process fracture  11/11/2013: COLONOSCOPY      Comment: Dr. Hayes, 3 year recheck  12/20/2016: COLONOSCOPY N/A      Comment: Procedure: COLONOSCOPY;  Surgeon: Atilio Hayes MD;  Location: [facility];  Service:                Endoscopy;  Laterality: N/A;  No date: HAND SURGERY      Comment: right dupuytrens release on 8/5/13  2011: LUMBAR LAMINECTOMY  2011: melanoma removal  No date: WISDOM TOOTH EXTRACTION    Review of patient's family history indicates:    Skin cancer                    Mother                    Ovarian cancer                 Mother                    Heart disease                  Mother                    Prostate cancer                Father                    Colon cancer                   Father                    Cancer                         Father                      Comment: glands    Melanoma                       Father                     Melanoma                       Sister                    Asthma                         Daughter                  Fibromyalgia                   Daughter                  Heart disease                  Maternal Uncle            Alcohol abuse                  Maternal Uncle            Cancer                         Paternal Aunt             Cerebral aneurysm              Maternal Grandmother      Early death                    Maternal Grandmother        Comment: fall hit head    Heart disease                  Maternal Grandfather      Cancer                         Paternal Grandmother      Pancreatic cancer              Paternal Grandmother      Cancer                         Sister                      Comment: cervix, abd     Melanoma                       Sister                    Skin cancer                    Sister                    Vaginal cancer                 Sister                    Macular degeneration           Neg Hx                    Retinal detachment             Neg Hx                    Glaucoma                       Neg Hx                    Psoriasis                      Neg Hx                    Lupus                          Neg Hx                    Eczema                         Neg Hx                    Social History    Marital status:              Spouse name:                       Years of education:                 Number of children:               Occupational History  Occupation          Employer            Comment                    [employer]              Social History Main Topics    Smoking status: Former Smoker                                                                Packs/day: 1.00      Years: 30.00          Types: Cigarettes       Quit date: 5/8/2014    Smokeless tobacco: Former User                          Types: Chew       Quit date: 9/22/1979    Alcohol use: Yes           6.0 oz/week       Standard drinks or equivalent: 12 per  week       Comment: Drinks beer only and mostly during                 football season    Drug use: No                Current Outpatient Prescriptions:     amLODIPine (NORVASC) 5 MG tablet, Take 1 tablet (5 mg total) by mouth once daily., Disp: 30 tablet, Rfl: 3    atorvastatin (LIPITOR) 10 MG tablet, TAKE 1 TABLET BY MOUTH DAILY, Disp: 90 tablet, Rfl: 3    furosemide (LASIX) 20 MG tablet, Take 1 tablet (20 mg total) by mouth once daily., Disp: 30 tablet, Rfl: 6    lisinopril (PRINIVIL,ZESTRIL) 40 MG tablet, Take 1 tablet (40 mg total) by mouth once daily., Disp: 90 tablet, Rfl: 3    multivitamin (ONE DAILY MULTIVITAMIN) per tablet, Take 1 tablet by mouth once daily., Disp: , Rfl:     TETANUS VACCINE due on 07/23/1973  Zoster Vaccine due on 07/23/2015        Review of Systems   Constitutional: Negative for activity change and unexpected weight change.   HENT: Negative for hearing loss, rhinorrhea and trouble swallowing.    Eyes: Positive for visual disturbance (Vision is slowly deteriorating). Negative for photophobia (He does have some glare difficulties at night) and discharge.   Respiratory: Negative for chest tightness and wheezing.    Cardiovascular: Negative for chest pain and palpitations.   Gastrointestinal: Negative for blood in stool, constipation, diarrhea and vomiting.   Endocrine: Negative for polydipsia and polyuria.   Genitourinary: Negative for difficulty urinating, hematuria and urgency.   Musculoskeletal: Negative for arthralgias, joint swelling and neck pain.   Neurological: Negative for weakness and headaches.   Psychiatric/Behavioral: Negative for confusion and dysphoric mood.       Objective:      Physical Exam   Constitutional: He is oriented to person, place, and time. He appears well-developed. No distress.   -------------------------              04/10/18                    0849        -------------------------   BP:         118/70        Pulse:        78           Resp:         12          Temp:   98 °F (36.7 °C)  -------------------------      Good blood pressure control  Afebrile  Normal respiration and oxygen saturation  Morbid obesity with BMI 42.3.  He is up 7.3 pounds from his previous physical January 25, 2018   HENT:   Head: Normocephalic and atraumatic.   Right Ear: External ear normal.   Left Ear: External ear normal.   Nose: Nose normal.   Mouth/Throat: Oropharynx is clear and moist. No oropharyngeal exudate.   Eyes: Conjunctivae and EOM are normal. Pupils are equal, round, and reactive to light. No scleral icterus.   Cataract upper portion of lens right eye   Neck: Normal range of motion. Neck supple. No JVD present. No tracheal deviation present. No thyromegaly present.   Cardiovascular: Normal rate, regular rhythm and normal heart sounds.  Exam reveals no gallop and no friction rub.    No murmur heard.  Carotid pulses 2+ without bruit   Pulmonary/Chest: Effort normal and breath sounds normal. No respiratory distress. He has no wheezes. He has no rales. He exhibits no tenderness.   Abdominal: Soft. Bowel sounds are normal. He exhibits no distension and no mass. There is no tenderness. There is no rebound and no guarding.   Musculoskeletal: Normal range of motion. He exhibits edema (1+ edema both legs and ankles). He exhibits no tenderness or deformity.   Lymphadenopathy:     He has no cervical adenopathy.   Neurological: He is alert and oriented to person, place, and time. He has normal reflexes. He displays normal reflexes. No cranial nerve deficit or sensory deficit. He exhibits normal muscle tone.   Skin: Skin is warm and dry. No rash noted. He is not diaphoretic. No erythema.   Psychiatric: He has a normal mood and affect. His behavior is normal. Thought content normal.   Nursing note and vitals reviewed.      Assessment:       1. Morbid obesity with BMI of 40.0-44.9, adult    2. Preop examination    3. Good hypertension control    4. Hyperlipidemia,  unspecified hyperlipidemia type    5. NAFLD (nonalcoholic fatty liver disease)    6. History of colon polyps    7. History of malignant melanoma of skin    8. Cataract of right eye, unspecified cataract type        Plan:       1. Morbid obesity with BMI of 40.0-44.9, adult    2. Preop examination  Clinically clear for surgery.  Patient will do his preop lab tomorrow and will get chest x-ray and EKG today  - IN OFFICE EKG 12-LEAD (to Muse)  - X-Ray Chest PA And Lateral; Future    3. Good hypertension control  No changes needed, hopefully we can reduce medications in the future as his weight comes down    4. Hyperlipidemia, unspecified hyperlipidemia type  Lab Results   Component Value Date    CHOL 192 01/26/2018    CHOL 183 01/19/2017    CHOL 222 (H) 07/06/2016     Lab Results   Component Value Date    HDL 73 01/26/2018    HDL 75 01/19/2017    HDL 76 (H) 07/06/2016     Lab Results   Component Value Date    LDLCALC 102.4 01/26/2018    LDLCALC 89.4 01/19/2017    LDLCALC 132.2 07/06/2016     Lab Results   Component Value Date    TRIG 83 01/26/2018    TRIG 93 01/19/2017    TRIG 69 07/06/2016     Lab Results   Component Value Date    CHOLHDL 38.0 01/26/2018    CHOLHDL 41.0 01/19/2017    CHOLHDL 34.2 07/06/2016     No changes needed, again expect him to possibly come off of the atorvastatin as his weight comes down    5. NAFLD (nonalcoholic fatty liver disease)  Await CMP ordered by Dr. Sneed    6. History of colon polyps  Last colonoscopy completed December 20, 2016 with a 3 year recheck recommended    7. History of malignant melanoma of skin  Remote removal from back no sign of recurrence    8. Cataract of right eye, unspecified cataract type  Minimal symptoms, not yet ready for cataract surgery         Patient readiness: acceptance and barriers:none    During the course of the visit the patient was educated and counseled about the following:     Hypertension:   Medication: no change.  Dietary sodium  restriction.  Regular aerobic exercise.  Obesity:   General weight loss/lifestyle modification strategies discussed (elicit support from others; identify saboteurs; non-food rewards, etc).  Informal exercise measures discussed, e.g. taking stairs instead of elevator.  Regular aerobic exercise program discussed.  bariatric surgery planned as above    Goals: Hypertension: Reduce Blood Pressure and Obesity: Reduce calorie intake and BMI    Did patient meet goals/outcomes: Yes    The following self management tools provided: blood pressure log  excercise log    Patient Instructions (the written plan) was given to the patient/family.     Time spent with patient: 30 minutes      ADDENDUM 4/11/18:    Lab, cxr, and ekg results are reviewed:    Lab Results   Component Value Date    WBC 6.50 04/11/2018    HGB 14.5 04/11/2018    HCT 43.1 04/11/2018    MCV 95 04/11/2018     04/11/2018     CMP  Sodium   Date Value Ref Range Status   04/11/2018 136 136 - 145 mmol/L Final     Potassium   Date Value Ref Range Status   04/11/2018 4.0 3.5 - 5.1 mmol/L Final     Chloride   Date Value Ref Range Status   04/11/2018 101 95 - 110 mmol/L Final     CO2   Date Value Ref Range Status   04/11/2018 26 23 - 29 mmol/L Final     Glucose   Date Value Ref Range Status   04/11/2018 107 70 - 110 mg/dL Final     BUN, Bld   Date Value Ref Range Status   04/11/2018 11 8 - 23 mg/dL Final     Creatinine   Date Value Ref Range Status   04/11/2018 0.8 0.5 - 1.4 mg/dL Final     Calcium   Date Value Ref Range Status   04/11/2018 9.5 8.7 - 10.5 mg/dL Final     Total Protein   Date Value Ref Range Status   04/11/2018 7.0 6.0 - 8.4 g/dL Final     Albumin   Date Value Ref Range Status   04/11/2018 4.1 3.5 - 5.2 g/dL Final     Total Bilirubin   Date Value Ref Range Status   04/11/2018 0.4 0.1 - 1.0 mg/dL Final     Comment:     For infants and newborns, interpretation of results should be based  on gestational age, weight and in agreement with  clinical  observations.  Premature Infant recommended reference ranges:  Up to 24 hours.............<8.0 mg/dL  Up to 48 hours............<12.0 mg/dL  3-5 days..................<15.0 mg/dL  6-29 days.................<15.0 mg/dL       Alkaline Phosphatase   Date Value Ref Range Status   04/11/2018 73 55 - 135 U/L Final     AST   Date Value Ref Range Status   04/11/2018 22 10 - 40 U/L Final     ALT   Date Value Ref Range Status   04/11/2018 25 10 - 44 U/L Final     Anion Gap   Date Value Ref Range Status   04/11/2018 9 8 - 16 mmol/L Final     eGFR if    Date Value Ref Range Status   04/11/2018 >60.0 >60 mL/min/1.73 m^2 Final     eGFR if non    Date Value Ref Range Status   04/11/2018 >60.0 >60 mL/min/1.73 m^2 Final     Comment:     Calculation used to obtain the estimated glomerular filtration  rate (eGFR) is the CKD-EPI equation.        CXR:    EXAMINATION:  XR CHEST PA AND LATERAL    CLINICAL HISTORY:  Encounter for other preprocedural examination    TECHNIQUE:  PA and lateral views of the chest were performed.    COMPARISON:  Chest x-ray of February 19, 2018.    FINDINGS:  The heart size is within normal limits..  There are bilateral pericardial fat pads noted.  No intrapulmonary mass or infiltrate is seen.  No pneumothorax or pleural effusion is noted.   Impression       negative chest.      Electronically signed by: Feroz Mcdonald MD  Date: 04/10/2018  Time: 10:46     EKG:  Test Reason : Z01.818,  Blood Pressure : mmHG  Vent. Rate : 072 BPM     Atrial Rate : 072 BPM     P-R Int : 146 ms          QRS Dur : 082 ms      QT Int : 420 ms       P-R-T Axes : 035 045 048 degrees     QTc Int : 459 ms    Normal sinus rhythm  Normal ECG  When compared with ECG of 01-AUG-2013 07:11,  T wave amplitude has decreased in Inferior leads  Confirmed by Vik Connelly MD ([id number]) on 4/10/2018 1:42:13 PM    Referred By: MELISSA PEREZ           Confirmed By:Vik Connelly MD      Specimen Collected: 04/10/18 08:45  Last Resulted: 04/10/18 13:42        Mr. Larkin is cleared for surgery at low risk.

## 2018-04-11 ENCOUNTER — PATIENT MESSAGE (OUTPATIENT)
Dept: FAMILY MEDICINE | Facility: CLINIC | Age: 63
End: 2018-04-11

## 2018-04-11 ENCOUNTER — LAB VISIT (OUTPATIENT)
Dept: LAB | Facility: HOSPITAL | Age: 63
End: 2018-04-11
Attending: SURGERY
Payer: COMMERCIAL

## 2018-04-11 DIAGNOSIS — Z13.220 SCREENING FOR LIPOID DISORDERS: ICD-10-CM

## 2018-04-11 DIAGNOSIS — Z79.899 NEED FOR PROPHYLACTIC CHEMOTHERAPY: ICD-10-CM

## 2018-04-11 DIAGNOSIS — R53.83 FATIGUE: ICD-10-CM

## 2018-04-11 DIAGNOSIS — R53.83 FATIGUE: Primary | ICD-10-CM

## 2018-04-11 DIAGNOSIS — Z13.29 SCREENING FOR THYROID DISORDER: ICD-10-CM

## 2018-04-11 DIAGNOSIS — Z13.21 SCREENING FOR MALNUTRITION: ICD-10-CM

## 2018-04-11 LAB
25(OH)D3+25(OH)D2 SERPL-MCNC: 22 NG/ML
ALBUMIN SERPL BCP-MCNC: 4.1 G/DL
ALP SERPL-CCNC: 73 U/L
ALT SERPL W/O P-5'-P-CCNC: 25 U/L
ANION GAP SERPL CALC-SCNC: 9 MMOL/L
AST SERPL-CCNC: 22 U/L
BASOPHILS # BLD AUTO: 0.04 K/UL
BASOPHILS NFR BLD: 0.6 %
BILIRUB SERPL-MCNC: 0.4 MG/DL
BUN SERPL-MCNC: 11 MG/DL
CALCIUM SERPL-MCNC: 9.5 MG/DL
CHLORIDE SERPL-SCNC: 101 MMOL/L
CHOLEST SERPL-MCNC: 168 MG/DL
CHOLEST/HDLC SERPL: 2.4 {RATIO}
CO2 SERPL-SCNC: 26 MMOL/L
CREAT SERPL-MCNC: 0.8 MG/DL
DIFFERENTIAL METHOD: ABNORMAL
EOSINOPHIL # BLD AUTO: 0.1 K/UL
EOSINOPHIL NFR BLD: 1.7 %
ERYTHROCYTE [DISTWIDTH] IN BLOOD BY AUTOMATED COUNT: 12.3 %
EST. GFR  (AFRICAN AMERICAN): >60 ML/MIN/1.73 M^2
EST. GFR  (NON AFRICAN AMERICAN): >60 ML/MIN/1.73 M^2
GLUCOSE SERPL-MCNC: 107 MG/DL
HCT VFR BLD AUTO: 43.1 %
HDLC SERPL-MCNC: 71 MG/DL
HDLC SERPL: 42.3 %
HGB BLD-MCNC: 14.5 G/DL
IMM GRANULOCYTES # BLD AUTO: 0.03 K/UL
IMM GRANULOCYTES NFR BLD AUTO: 0.5 %
LDLC SERPL CALC-MCNC: 83.2 MG/DL
LYMPHOCYTES # BLD AUTO: 1.7 K/UL
LYMPHOCYTES NFR BLD: 25.7 %
MCH RBC QN AUTO: 32 PG
MCHC RBC AUTO-ENTMCNC: 33.6 G/DL
MCV RBC AUTO: 95 FL
MONOCYTES # BLD AUTO: 0.6 K/UL
MONOCYTES NFR BLD: 9.8 %
NEUTROPHILS # BLD AUTO: 4 K/UL
NEUTROPHILS NFR BLD: 61.7 %
NONHDLC SERPL-MCNC: 97 MG/DL
NRBC BLD-RTO: 0 /100 WBC
PLATELET # BLD AUTO: 267 K/UL
PMV BLD AUTO: 10.1 FL
POTASSIUM SERPL-SCNC: 4 MMOL/L
PROT SERPL-MCNC: 7 G/DL
RBC # BLD AUTO: 4.53 M/UL
SODIUM SERPL-SCNC: 136 MMOL/L
TRIGL SERPL-MCNC: 69 MG/DL
VIT B12 SERPL-MCNC: 355 PG/ML
WBC # BLD AUTO: 6.5 K/UL

## 2018-04-11 PROCEDURE — 85025 COMPLETE CBC W/AUTO DIFF WBC: CPT

## 2018-04-11 PROCEDURE — 84425 ASSAY OF VITAMIN B-1: CPT

## 2018-04-11 PROCEDURE — 82306 VITAMIN D 25 HYDROXY: CPT

## 2018-04-11 PROCEDURE — 80053 COMPREHEN METABOLIC PANEL: CPT

## 2018-04-11 PROCEDURE — 36415 COLL VENOUS BLD VENIPUNCTURE: CPT | Mod: PO

## 2018-04-11 PROCEDURE — 82607 VITAMIN B-12: CPT

## 2018-04-11 PROCEDURE — 84590 ASSAY OF VITAMIN A: CPT

## 2018-04-11 PROCEDURE — 80061 LIPID PANEL: CPT

## 2018-04-16 LAB — VIT A SERPL-MCNC: 49 UG/DL (ref 38–106)

## 2018-04-17 LAB — VIT B1 SERPL-MCNC: 51 UG/L (ref 38–122)

## 2018-04-18 NOTE — PROGRESS NOTES
Patient reports he has an appointment to sign papers for his bariatric surgery on Monday and he will know when he will have surgery.   Last 5 Patient Entered Readings                                      Current 30 Day Average: 135/78     Recent Readings 4/17/2018 4/17/2018 4/8/2018 3/31/2018 3/31/2018    SBP (mmHg) 139 130 139 140 151    DBP (mmHg) 70 88 81 84 89    Pulse 77 79 67 65 67         Hypertension Digital Medicine Program (HDMP): Health  Follow Up    Lifestyle Modifications:    1.Low sodium diet: yes. Patient reports he still watching is sodium intake by avoid foods that have high sodium in them.     2.Physical activity: no. Patient reports he has not been doing chair exercises d/t it hurting his lower back.     3.Hypotension/Hypertension symptoms: no. Patient reports he has no s/s of hypotension( dizziness, LH , nausea, or fatigue) with low readings. Patient reports he has no s/s hypertension ( CP , SOB, severe headaches, or change in vision ) with high readings.   Frequency/Alleviating factors/Precipitating factors, etc.     4.Patient has been compliant with the medication regimen.     Follow up with Mr. Mal Larkin completed. No further questions or concerns.     Patient's current 30 day BP is not at goal.     Plan:  I will follow up in a few weeks to assess progress.

## 2018-05-10 ENCOUNTER — PATIENT OUTREACH (OUTPATIENT)
Dept: OTHER | Facility: OTHER | Age: 63
End: 2018-05-10

## 2018-05-10 NOTE — PROGRESS NOTES
Last 5 Patient Entered Readings                                      Current 30 Day Average: 137/80     Recent Readings 5/10/2018 5/10/2018 5/10/2018 5/8/2018 5/8/2018    SBP (mmHg) 121 127 148 133 140    DBP (mmHg) 75 84 87 77 78    Pulse 92 94 92 88 89        5/10- Called patient to follow-up. Patient reports he had his bariatric surgery and is recovering from that. Patient states his weight has went down from 288 to 270 lbs. Patient reports he feels fine and that he has not taken BP medication d/t having surgery.   Deferred ffollowing -up on diet and exercise at this time since patient just recently had surgery. Will let his PharmD Alin know about the surgery and will call in 3 weeks to see how he is doing.

## 2018-05-11 DIAGNOSIS — I10 ESSENTIAL HYPERTENSION: ICD-10-CM

## 2018-05-11 RX ORDER — FUROSEMIDE 20 MG/1
TABLET ORAL
Qty: 30 TABLET | Refills: 5 | Status: SHIPPED | OUTPATIENT
Start: 2018-05-11 | End: 2018-11-07 | Stop reason: SDUPTHER

## 2018-05-14 ENCOUNTER — LAB VISIT (OUTPATIENT)
Dept: LAB | Facility: HOSPITAL | Age: 63
End: 2018-05-14
Attending: SURGERY
Payer: COMMERCIAL

## 2018-05-14 DIAGNOSIS — Z13.21 MALNUTRITION SCREEN: Primary | ICD-10-CM

## 2018-05-14 DIAGNOSIS — E78.00 PURE HYPERCHOLESTEROLEMIA: ICD-10-CM

## 2018-05-14 DIAGNOSIS — I10 HYPERTENSION, ESSENTIAL: ICD-10-CM

## 2018-05-14 DIAGNOSIS — Z13.220 SCREENING FOR LIPOID DISORDERS: ICD-10-CM

## 2018-05-14 DIAGNOSIS — R68.89 DEFICIENCIES OF HEAD: ICD-10-CM

## 2018-05-14 DIAGNOSIS — R53.83 FATIGUE: ICD-10-CM

## 2018-05-14 DIAGNOSIS — Z79.899 ENCOUNTER FOR LONG-TERM (CURRENT) USE OF MEDICATIONS: ICD-10-CM

## 2018-05-14 DIAGNOSIS — E55.9 VITAMIN D DEFICIENCY DISEASE: ICD-10-CM

## 2018-05-14 DIAGNOSIS — E56.9 MULTIPLE VITAMIN DEFICIENCY DISEASE: ICD-10-CM

## 2018-05-14 LAB
25(OH)D3+25(OH)D2 SERPL-MCNC: 26 NG/ML
ALBUMIN SERPL BCP-MCNC: 3.5 G/DL
ALP SERPL-CCNC: 70 U/L
ALT SERPL W/O P-5'-P-CCNC: 19 U/L
ANION GAP SERPL CALC-SCNC: 13 MMOL/L
AST SERPL-CCNC: 17 U/L
BASOPHILS # BLD AUTO: 0.05 K/UL
BASOPHILS NFR BLD: 0.5 %
BILIRUB SERPL-MCNC: 0.9 MG/DL
BUN SERPL-MCNC: 11 MG/DL
CALCIUM SERPL-MCNC: 9.8 MG/DL
CHLORIDE SERPL-SCNC: 99 MMOL/L
CO2 SERPL-SCNC: 25 MMOL/L
CREAT SERPL-MCNC: 0.8 MG/DL
DIFFERENTIAL METHOD: ABNORMAL
EOSINOPHIL # BLD AUTO: 0.1 K/UL
EOSINOPHIL NFR BLD: 1 %
ERYTHROCYTE [DISTWIDTH] IN BLOOD BY AUTOMATED COUNT: 12.9 %
EST. GFR  (AFRICAN AMERICAN): >60 ML/MIN/1.73 M^2
EST. GFR  (NON AFRICAN AMERICAN): >60 ML/MIN/1.73 M^2
FOLATE SERPL-MCNC: 14.1 NG/ML
GLUCOSE SERPL-MCNC: 85 MG/DL
HCT VFR BLD AUTO: 31.8 %
HGB BLD-MCNC: 10.1 G/DL
IMM GRANULOCYTES # BLD AUTO: 0.12 K/UL
IMM GRANULOCYTES NFR BLD AUTO: 1.1 %
LYMPHOCYTES # BLD AUTO: 1.6 K/UL
LYMPHOCYTES NFR BLD: 15.3 %
MAGNESIUM SERPL-MCNC: 2.3 MG/DL
MCH RBC QN AUTO: 30.8 PG
MCHC RBC AUTO-ENTMCNC: 31.8 G/DL
MCV RBC AUTO: 97 FL
MONOCYTES # BLD AUTO: 0.7 K/UL
MONOCYTES NFR BLD: 6.5 %
NEUTROPHILS # BLD AUTO: 8 K/UL
NEUTROPHILS NFR BLD: 75.6 %
NRBC BLD-RTO: 0 /100 WBC
PLATELET # BLD AUTO: 541 K/UL
PMV BLD AUTO: 9.4 FL
POTASSIUM SERPL-SCNC: 4.4 MMOL/L
PROT SERPL-MCNC: 7.4 G/DL
RBC # BLD AUTO: 3.28 M/UL
SODIUM SERPL-SCNC: 137 MMOL/L
VIT B12 SERPL-MCNC: 931 PG/ML
WBC # BLD AUTO: 10.51 K/UL

## 2018-05-14 PROCEDURE — 83735 ASSAY OF MAGNESIUM: CPT

## 2018-05-14 PROCEDURE — 85025 COMPLETE CBC W/AUTO DIFF WBC: CPT

## 2018-05-14 PROCEDURE — 82306 VITAMIN D 25 HYDROXY: CPT

## 2018-05-14 PROCEDURE — 36415 COLL VENOUS BLD VENIPUNCTURE: CPT | Mod: PO

## 2018-05-14 PROCEDURE — 82746 ASSAY OF FOLIC ACID SERUM: CPT

## 2018-05-14 PROCEDURE — 80053 COMPREHEN METABOLIC PANEL: CPT

## 2018-05-14 PROCEDURE — 82607 VITAMIN B-12: CPT

## 2018-05-18 ENCOUNTER — PATIENT OUTREACH (OUTPATIENT)
Dept: OTHER | Facility: OTHER | Age: 63
End: 2018-05-18

## 2018-05-18 NOTE — PROGRESS NOTES
Last 5 Patient Entered Readings                                      Current 30 Day Average: 135/81     Recent Readings 5/17/2018 5/16/2018 5/16/2018 5/14/2018 5/14/2018    SBP (mmHg) 122 142 149 131 143    DBP (mmHg) 76 78 81 82 88    Pulse 81 80 81 104 104        Mr. Larkin has lost 30 lbs since bariatric surgery about 2 weeks ago. His BP is stable without his antihypertensive medications. Asked that he call before restarting any medications if he is concerned his BP starts to increase. He questions the accuracy of his [facility] BP monitor as BP is typically lower at clinic. He will bring his BP monitor with him to his bariatric follow up appointment to have it compared to a manual reading.     Will continue to monitor regularly. Will follow up in 4-6 weeks, sooner if BP begins to trend upward or downward.    Patient has my contact information and knows to call with any concerns or clinical changes.     Current HTN regimen:  Hypertension Medications             amLODIPine (NORVASC) 5 MG tablet Take 1 tablet (5 mg total) by mouth once daily. ON HOLD    furosemide (LASIX) 20 MG tablet TAKE ONE TABLET BY MOUTH ONCE DAILY ON HOLD    lisinopril (PRINIVIL,ZESTRIL) 40 MG tablet Take 1 tablet (40 mg total) by mouth once daily. ON HOLD

## 2018-06-15 ENCOUNTER — PATIENT OUTREACH (OUTPATIENT)
Dept: OTHER | Facility: OTHER | Age: 63
End: 2018-06-15

## 2018-06-15 NOTE — PROGRESS NOTES
Called patient to follow-up. Patient states there is a significant difference in his BP numbers when he take his BP at home than when its taken at the clinic. Patient states his BP readings are much lower in the clinic than at home when he take his own BP. Advised patient to bring BP cuff to the [facility] to exchange it or bring it to his next follow-up appointment. Patient states he will call the [facility] next week for help.  Patient states he has now lost 50 lbs.  Last 5 Patient Entered Readings                                      Current 30 Day Average: 132/77     Recent Readings 6/14/2018 6/14/2018 6/14/2018 6/8/2018 6/8/2018    SBP (mmHg) 138 133 142 116 133    DBP (mmHg) 76 79 84 68 76    Pulse 67 68 69 81 81        Digital Medicine: Health  Follow Up    Lifestyle Modifications:    1.Dietary Modifications (Sodium intake <2,000mg/day, food labels, dining out): Patein reports he still watching his sodium intake.     2.Physical Activity: Patient reports he has been riding 3 miles on a  bike with a friend everyday.     3.Medication Therapy: Patient has been compliant with the medication regimen.    4.Patient has the following medication side effects/concerns: Patient denies any s/s of hypotension ( dizziness, LH, nausea, or fatigue) with low readings. Patient denies any s/s of hypertension (CP, SOB, severe headaches, or change in vision) with high readings.   (Frequency/Alleviating factors/Precipitating factors, etc.)     Follow up with Mr. Mal Larkin completed. No further questions or concerns.     Patient's current 30 day average BP is not at goal.     Plan: Will continue follow up to achieve health goals.

## 2018-06-25 ENCOUNTER — PATIENT OUTREACH (OUTPATIENT)
Dept: OTHER | Facility: OTHER | Age: 63
End: 2018-06-25

## 2018-07-06 ENCOUNTER — LAB VISIT (OUTPATIENT)
Dept: LAB | Facility: HOSPITAL | Age: 63
End: 2018-07-06
Attending: SURGERY
Payer: COMMERCIAL

## 2018-07-06 DIAGNOSIS — Z13.21 SCREENING FOR MALNUTRITION: ICD-10-CM

## 2018-07-06 DIAGNOSIS — R53.83 FATIGUE: ICD-10-CM

## 2018-07-06 DIAGNOSIS — Z13.29 SCREENING FOR THYROID DISORDER: ICD-10-CM

## 2018-07-06 DIAGNOSIS — Z13.220 SCREENING FOR LIPOID DISORDERS: ICD-10-CM

## 2018-07-06 DIAGNOSIS — I10 HYPERTENSION, ESSENTIAL: ICD-10-CM

## 2018-07-06 DIAGNOSIS — E78.5 HYPERLIPIDEMIA: ICD-10-CM

## 2018-07-06 DIAGNOSIS — E78.1 PURE HYPERGLYCERIDEMIA: ICD-10-CM

## 2018-07-06 DIAGNOSIS — E55.9 AVITAMINOSIS D: Primary | ICD-10-CM

## 2018-07-06 DIAGNOSIS — Z79.899 ENCOUNTER FOR LONG-TERM (CURRENT) USE OF MEDICATIONS: ICD-10-CM

## 2018-07-06 LAB
25(OH)D3+25(OH)D2 SERPL-MCNC: 42 NG/ML
ALBUMIN SERPL BCP-MCNC: 3.9 G/DL
ALP SERPL-CCNC: 79 U/L
ALT SERPL W/O P-5'-P-CCNC: 16 U/L
ANION GAP SERPL CALC-SCNC: 14 MMOL/L
AST SERPL-CCNC: 18 U/L
BASOPHILS # BLD AUTO: 0.02 K/UL
BASOPHILS NFR BLD: 0.4 %
BILIRUB SERPL-MCNC: 0.5 MG/DL
BUN SERPL-MCNC: 11 MG/DL
CALCIUM SERPL-MCNC: 9.8 MG/DL
CHLORIDE SERPL-SCNC: 96 MMOL/L
CHOLEST SERPL-MCNC: 160 MG/DL
CHOLEST/HDLC SERPL: 3 {RATIO}
CO2 SERPL-SCNC: 24 MMOL/L
CREAT SERPL-MCNC: 0.7 MG/DL
DIFFERENTIAL METHOD: ABNORMAL
EOSINOPHIL # BLD AUTO: 0.1 K/UL
EOSINOPHIL NFR BLD: 2.1 %
ERYTHROCYTE [DISTWIDTH] IN BLOOD BY AUTOMATED COUNT: 13.5 %
EST. GFR  (AFRICAN AMERICAN): >60 ML/MIN/1.73 M^2
EST. GFR  (NON AFRICAN AMERICAN): >60 ML/MIN/1.73 M^2
ESTIMATED AVG GLUCOSE: 94 MG/DL
FOLATE SERPL-MCNC: 16.4 NG/ML
GLUCOSE SERPL-MCNC: 76 MG/DL
HBA1C MFR BLD HPLC: 4.9 %
HCT VFR BLD AUTO: 40.9 %
HDLC SERPL-MCNC: 53 MG/DL
HDLC SERPL: 33.1 %
HGB BLD-MCNC: 13.4 G/DL
IMM GRANULOCYTES # BLD AUTO: 0.01 K/UL
IMM GRANULOCYTES NFR BLD AUTO: 0.2 %
LDLC SERPL CALC-MCNC: 95.4 MG/DL
LYMPHOCYTES # BLD AUTO: 1.5 K/UL
LYMPHOCYTES NFR BLD: 27.8 %
MCH RBC QN AUTO: 29.3 PG
MCHC RBC AUTO-ENTMCNC: 32.8 G/DL
MCV RBC AUTO: 90 FL
MONOCYTES # BLD AUTO: 0.4 K/UL
MONOCYTES NFR BLD: 8.3 %
NEUTROPHILS # BLD AUTO: 3.2 K/UL
NEUTROPHILS NFR BLD: 61.2 %
NONHDLC SERPL-MCNC: 107 MG/DL
NRBC BLD-RTO: 0 /100 WBC
PLATELET # BLD AUTO: 274 K/UL
PMV BLD AUTO: 10.6 FL
POTASSIUM SERPL-SCNC: 4.1 MMOL/L
PROT SERPL-MCNC: 6.8 G/DL
RBC # BLD AUTO: 4.57 M/UL
SODIUM SERPL-SCNC: 134 MMOL/L
TRIGL SERPL-MCNC: 58 MG/DL
VIT B12 SERPL-MCNC: 1023 PG/ML
WBC # BLD AUTO: 5.21 K/UL

## 2018-07-06 PROCEDURE — 80053 COMPREHEN METABOLIC PANEL: CPT

## 2018-07-06 PROCEDURE — 85025 COMPLETE CBC W/AUTO DIFF WBC: CPT

## 2018-07-06 PROCEDURE — 82746 ASSAY OF FOLIC ACID SERUM: CPT

## 2018-07-06 PROCEDURE — 83036 HEMOGLOBIN GLYCOSYLATED A1C: CPT

## 2018-07-06 PROCEDURE — 82306 VITAMIN D 25 HYDROXY: CPT

## 2018-07-06 PROCEDURE — 80061 LIPID PANEL: CPT

## 2018-07-06 PROCEDURE — 84425 ASSAY OF VITAMIN B-1: CPT

## 2018-07-06 PROCEDURE — 82607 VITAMIN B-12: CPT

## 2018-07-06 PROCEDURE — 84590 ASSAY OF VITAMIN A: CPT

## 2018-07-06 PROCEDURE — 36415 COLL VENOUS BLD VENIPUNCTURE: CPT | Mod: PO

## 2018-07-09 LAB
VIT A SERPL-MCNC: 26 UG/DL (ref 38–106)
VIT B1 SERPL-MCNC: 50 UG/L (ref 38–122)

## 2018-07-19 ENCOUNTER — PATIENT OUTREACH (OUTPATIENT)
Dept: OTHER | Facility: OTHER | Age: 63
End: 2018-07-19

## 2018-07-19 NOTE — PROGRESS NOTES
Called patient to follow-up. Patient states he has together lost 64 lbs.   Last 5 Patient Entered Readings                                      Current 30 Day Average: 125/76     Recent Readings 7/18/2018 7/18/2018 7/14/2018 7/7/2018 7/7/2018    SBP (mmHg) 116 142 118 125 129    DBP (mmHg) 77 87 75 78 81    Pulse 73 77 71 63 64        Digital Medicine: Health  Follow Up    Lifestyle Modifications:    1.Dietary Modifications (Sodium intake <2,000mg/day, food labels, dining out): Patient reports he still watching his sodium intake and eating 4-5 small meals through out the day.     2.Physical Activity: Patient reports he still riding his bike with his friend and wife up to 6 miles everyday.     3.Medication Therapy: Patient has been compliant with the medication regimen.    4.Patient has the following medication side effects/concerns: Patient denies any s/s of hypotension (dizziness, LH,nausea,or fatigue) with low readings.   (Frequency/Alleviating factors/Precipitating factors, etc.)     Follow up with Mr. Mal Larkin completed. No further questions or concerns.     Patient's current 30 day average BP is at goal.     Plan: Will continue follow up to achieve health goals.

## 2018-07-23 ENCOUNTER — PATIENT MESSAGE (OUTPATIENT)
Dept: ADMINISTRATIVE | Facility: OTHER | Age: 63
End: 2018-07-23

## 2018-08-02 ENCOUNTER — PATIENT OUTREACH (OUTPATIENT)
Dept: OTHER | Facility: OTHER | Age: 63
End: 2018-08-02

## 2018-08-02 NOTE — PROGRESS NOTES
Last 5 Patient Entered Readings                                      Current 30 Day Average: 119/76     Recent Readings 7/29/2018 7/24/2018 7/24/2018 7/18/2018 7/18/2018    SBP (mmHg) 119 118 125 116 142    DBP (mmHg) 76 73 80 77 87    Pulse 66 60 59 73 77        Mr. Larkin's BP readings are at goal. He is now at 218 lbs and has a follow up with his bariatric surgeon on Monday. He rides his bike about 10 miles/day. He confirms he is no longer taking any antihypertensives and is controlling his BP with lifestyle modifications. He is also off of atorvastatin now as lipid panel was at goal.     Patient's BP average is controlled based on 2017 ACC/AHA HTN guidelines of goal BP <130/80.      Patient's health , Gregoria Lobato, will be following up every 3-4 weeks. I will continue to monitor regularly and will follow up in 6 months, sooner if BP begins to trend upward or downward.    Patient has my contact information and knows to call with any concerns or clinical changes.     Current HTN regimen:  Hypertension Medications             furosemide (LASIX) 20 MG tablet TAKE ONE TABLET BY MOUTH ONCE DAILY

## 2018-09-07 ENCOUNTER — PATIENT OUTREACH (OUTPATIENT)
Dept: OTHER | Facility: OTHER | Age: 63
End: 2018-09-07

## 2018-09-07 NOTE — PROGRESS NOTES
Last 5 Patient Entered Readings                                      Current 30 Day Average: 117/71     Recent Readings 9/4/2018 9/4/2018 9/1/2018 9/1/2018 9/1/2018    SBP (mmHg) 115 122 118 131 130    DBP (mmHg) 71 76 73 77 81    Pulse 65 67 56 68 68        Digital Medicine: Health  Follow Up    Lifestyle Modifications:    1.Dietary Modifications (Sodium intake <2,000mg/day, food labels, dining out): Patient reports he still watching his sodium intake closely.     2.Physical Activity: Patient reports he still ring his bike everyday and recently join a gym an month ago.     3.Medication Therapy: Patient has been compliant with the medication regimen.    4.Patient has the following medication side effects/concerns: Patient denies any s/s of hypotension (dizziness, LH,nausea, or fatigue) with low readings. Patient denies any s/s of hypertension (CP,SOB,severe headaches,or change in vision) with high readings.   (Frequency/Alleviating factors/Precipitating factors, etc.)     Follow up with Mr. Mal Larkin completed. No further questions or concerns.     Patient's current 30 day average BP is at goal.     Plan: Will continue follow up to achieve health goals.

## 2018-09-15 ENCOUNTER — PATIENT MESSAGE (OUTPATIENT)
Dept: ADMINISTRATIVE | Facility: OTHER | Age: 63
End: 2018-09-15

## 2018-10-09 ENCOUNTER — LAB VISIT (OUTPATIENT)
Dept: LAB | Facility: HOSPITAL | Age: 63
End: 2018-10-09
Attending: SURGERY
Payer: COMMERCIAL

## 2018-10-09 DIAGNOSIS — E55.9 AVITAMINOSIS D: ICD-10-CM

## 2018-10-09 DIAGNOSIS — E78.5 HYPERLIPEMIA: ICD-10-CM

## 2018-10-09 DIAGNOSIS — I10 ESSENTIAL HYPERTENSION, MALIGNANT: Primary | ICD-10-CM

## 2018-10-09 DIAGNOSIS — E78.1 PURE HYPERGLYCERIDEMIA: ICD-10-CM

## 2018-10-09 LAB
25(OH)D3+25(OH)D2 SERPL-MCNC: 57 NG/ML
ALBUMIN SERPL BCP-MCNC: 3.9 G/DL
ALP SERPL-CCNC: 81 U/L
ALT SERPL W/O P-5'-P-CCNC: 11 U/L
ANION GAP SERPL CALC-SCNC: 9 MMOL/L
AST SERPL-CCNC: 17 U/L
BASOPHILS # BLD AUTO: 0.05 K/UL
BASOPHILS NFR BLD: 1 %
BILIRUB SERPL-MCNC: 0.6 MG/DL
BUN SERPL-MCNC: 12 MG/DL
CALCIUM SERPL-MCNC: 9.5 MG/DL
CHLORIDE SERPL-SCNC: 102 MMOL/L
CHOLEST SERPL-MCNC: 190 MG/DL
CHOLEST/HDLC SERPL: 3.2 {RATIO}
CO2 SERPL-SCNC: 28 MMOL/L
CREAT SERPL-MCNC: 0.8 MG/DL
DIFFERENTIAL METHOD: ABNORMAL
EOSINOPHIL # BLD AUTO: 0.1 K/UL
EOSINOPHIL NFR BLD: 1.6 %
ERYTHROCYTE [DISTWIDTH] IN BLOOD BY AUTOMATED COUNT: 15.9 %
EST. GFR  (AFRICAN AMERICAN): >60 ML/MIN/1.73 M^2
EST. GFR  (NON AFRICAN AMERICAN): >60 ML/MIN/1.73 M^2
GLUCOSE SERPL-MCNC: 91 MG/DL
HCT VFR BLD AUTO: 40.8 %
HDLC SERPL-MCNC: 59 MG/DL
HDLC SERPL: 31.1 %
HGB BLD-MCNC: 13.4 G/DL
IMM GRANULOCYTES # BLD AUTO: 0.02 K/UL
IMM GRANULOCYTES NFR BLD AUTO: 0.4 %
LDLC SERPL CALC-MCNC: 117.2 MG/DL
LYMPHOCYTES # BLD AUTO: 1.6 K/UL
LYMPHOCYTES NFR BLD: 32.9 %
MCH RBC QN AUTO: 30.2 PG
MCHC RBC AUTO-ENTMCNC: 32.8 G/DL
MCV RBC AUTO: 92 FL
MONOCYTES # BLD AUTO: 0.5 K/UL
MONOCYTES NFR BLD: 9.5 %
NEUTROPHILS # BLD AUTO: 2.7 K/UL
NEUTROPHILS NFR BLD: 54.6 %
NONHDLC SERPL-MCNC: 131 MG/DL
NRBC BLD-RTO: 0 /100 WBC
PLATELET # BLD AUTO: 244 K/UL
PMV BLD AUTO: 11.4 FL
POTASSIUM SERPL-SCNC: 3.9 MMOL/L
PROT SERPL-MCNC: 6.4 G/DL
RBC # BLD AUTO: 4.44 M/UL
SODIUM SERPL-SCNC: 139 MMOL/L
TRIGL SERPL-MCNC: 69 MG/DL
VIT B12 SERPL-MCNC: 714 PG/ML
WBC # BLD AUTO: 4.95 K/UL

## 2018-10-09 PROCEDURE — 80061 LIPID PANEL: CPT

## 2018-10-09 PROCEDURE — 36415 COLL VENOUS BLD VENIPUNCTURE: CPT | Mod: PO

## 2018-10-09 PROCEDURE — 84590 ASSAY OF VITAMIN A: CPT

## 2018-10-09 PROCEDURE — 85025 COMPLETE CBC W/AUTO DIFF WBC: CPT

## 2018-10-09 PROCEDURE — 80053 COMPREHEN METABOLIC PANEL: CPT

## 2018-10-09 PROCEDURE — 84425 ASSAY OF VITAMIN B-1: CPT

## 2018-10-09 PROCEDURE — 82306 VITAMIN D 25 HYDROXY: CPT

## 2018-10-09 PROCEDURE — 82607 VITAMIN B-12: CPT

## 2018-10-12 LAB — VIT B1 SERPL-MCNC: 41 UG/L (ref 38–122)

## 2018-10-15 ENCOUNTER — PATIENT OUTREACH (OUTPATIENT)
Dept: OTHER | Facility: OTHER | Age: 63
End: 2018-10-15

## 2018-10-15 ENCOUNTER — PATIENT MESSAGE (OUTPATIENT)
Dept: OTHER | Facility: OTHER | Age: 63
End: 2018-10-15

## 2018-10-15 NOTE — PROGRESS NOTES
Last 5 Patient Entered Readings                                      Current 30 Day Average: 119/71     Recent Readings 10/7/2018 9/29/2018 9/26/2018 9/26/2018 9/19/2018    SBP (mmHg) 109 121 130 133 113    DBP (mmHg) 67 71 73 72 71    Pulse 66 62 66 68 60        Digital Medicine: Health  Follow Up    Lifestyle Modifications:    1.Dietary Modifications (Sodium intake <2,000mg/day, food labels, dining out): Patient reports he still watching his sodium intake.     2.Physical Activity: Patient reports he still going to the gym and riding his bike regularly.     3.Medication Therapy: Patient has been compliant with the medication regimen.    4.Patient has the following medication side effects/concerns: Patient denies any s/s of hypotension (dizziness, LH,nausea, or fatigue) with low readings. Patient denies any s/s of hypertension (CP,SOB,severe headaches, or change in vision) with high readings.   (Frequency/Alleviating factors/Precipitating factors, etc.)     Follow up with Mr. Mal Larkin completed. No further questions or concerns.     Patient's current 30 day average BP is at goal.     Plan: Will continue to follow up to achieve health goals.

## 2018-10-22 ENCOUNTER — OFFICE VISIT (OUTPATIENT)
Dept: ORTHOPEDICS | Facility: CLINIC | Age: 63
End: 2018-10-22
Payer: COMMERCIAL

## 2018-10-22 VITALS
SYSTOLIC BLOOD PRESSURE: 118 MMHG | DIASTOLIC BLOOD PRESSURE: 70 MMHG | BODY MASS INDEX: 28.58 KG/M2 | WEIGHT: 193 LBS | HEIGHT: 69 IN

## 2018-10-22 DIAGNOSIS — M48.062 SPINAL STENOSIS, LUMBAR REGION, WITH NEUROGENIC CLAUDICATION: Primary | ICD-10-CM

## 2018-10-22 DIAGNOSIS — M96.0 PSEUDARTHROSIS AFTER FUSION OR ARTHRODESIS: ICD-10-CM

## 2018-10-22 DIAGNOSIS — Z98.890 HISTORY OF LUMBAR LAMINECTOMY: ICD-10-CM

## 2018-10-22 DIAGNOSIS — M54.16 LUMBAR RADICULOPATHY, CHRONIC: ICD-10-CM

## 2018-10-22 DIAGNOSIS — M43.17 SPONDYLOLISTHESIS AT L5-S1 LEVEL: ICD-10-CM

## 2018-10-22 PROCEDURE — 3078F DIAST BP <80 MM HG: CPT | Mod: ,,, | Performed by: ORTHOPAEDIC SURGERY

## 2018-10-22 PROCEDURE — 72100 X-RAY EXAM L-S SPINE 2/3 VWS: CPT | Mod: ,,, | Performed by: ORTHOPAEDIC SURGERY

## 2018-10-22 PROCEDURE — 3008F BODY MASS INDEX DOCD: CPT | Mod: ,,, | Performed by: ORTHOPAEDIC SURGERY

## 2018-10-22 PROCEDURE — 3074F SYST BP LT 130 MM HG: CPT | Mod: ,,, | Performed by: ORTHOPAEDIC SURGERY

## 2018-10-22 PROCEDURE — 99204 OFFICE O/P NEW MOD 45 MIN: CPT | Mod: ,,, | Performed by: ORTHOPAEDIC SURGERY

## 2018-10-22 PROCEDURE — 72170 X-RAY EXAM OF PELVIS: CPT | Mod: ,,, | Performed by: ORTHOPAEDIC SURGERY

## 2018-10-22 RX ORDER — ESOMEPRAZOLE MAGNESIUM 40 MG/1
40 CAPSULE, DELAYED RELEASE ORAL DAILY
Refills: 1 | COMMUNITY
Start: 2018-10-18 | End: 2018-10-22

## 2018-10-22 RX ORDER — CHLORTHALIDONE 25 MG/1
TABLET ORAL
Refills: 11 | COMMUNITY
Start: 2018-08-21 | End: 2018-10-22

## 2018-10-22 NOTE — PROGRESS NOTES
Subjective:       Patient ID: Mal Larkin is a 63 y.o. male.    Chief Complaint: Pain of the Lumbar Spine (Lumbar pain x 40 years. Pain radiates down both legs to feet with numbness. Limited standing and walking. No bowel or bladder problems. He has had two lumbar surgeries)      History of Present Illness  This man is a 40 a history of chronic low back pain due to spondylolisthesis and had 2 laminectomies. The last laminectomy performed in 2013 did not help any. He has back pain and bilateral leg pain left greater than right. The left leg is bent numbness from the back to the thigh and the left foot for 30 years. He does have numbness of the right foot. He has no bowel or bladder incontinence.    Current Medications  Current Outpatient Medications   Medication Sig Dispense Refill    furosemide (LASIX) 20 MG tablet TAKE ONE TABLET BY MOUTH ONCE DAILY 30 tablet 5    multivitamin (ONE DAILY MULTIVITAMIN) per tablet Take 1 tablet by mouth once daily.       No current facility-administered medications for this visit.        Allergies  Review of patient's allergies indicates:  No Known Allergies    Past Medical History  Past Medical History:   Diagnosis Date    Adenomatous colon polyp     + dysplasia    Colon polyp     Fatty liver     Hypertension     Melanoma 2011    in situ - upper back       Surgical History  Past Surgical History:   Procedure Laterality Date    APPENDECTOMY      BACK SURGERY      transverse process fracture    BLOCK-NERVE-MEDIAL BRANCH-LUMBAR Bilateral 8/3/2017    [facility]    COLONOSCOPY  11/11/2013    Dr. Hayes, 3 year recheck    COLONOSCOPY N/A 12/20/2016    Procedure: COLONOSCOPY;  Surgeon: Atilio Hayes MD;  Location: [facility];  Service: Endoscopy;  Laterality: N/A;    COLONOSCOPY N/A 12/20/2016    [facility] at [facility]    COLONOSCOPY N/A 11/11/2013    [facility] at [facility]    COLONOSCOPY N/A  8/1/2013    [facility]O    GASTRIC BYPASS      HAND SURGERY      right dupuytrens release on 8/5/13    LUMBAR LAMINECTOMY  2011    melanoma removal  2011    RADIOFREQUENCY THERMOCOAGULATION (RFTC)-NERVE-MEDIAN BRANCH-LUMBAR Bilateral 8/22/2017    [facility] at [facility] OR    RELEASE DUPUYTREN'S CONTRACTURE Right 8/5/2013    [facility] OR    WISDOM TOOTH EXTRACTION         Family History:   Family History   Problem Relation Age of Onset    Skin cancer Mother     Ovarian cancer Mother     Heart disease Mother     Prostate cancer Father     Colon cancer Father     Cancer Father         glands    Melanoma Father     Melanoma Sister     Asthma Daughter     Fibromyalgia Daughter     Heart disease Maternal Uncle     Alcohol abuse Maternal Uncle     Cancer Paternal Aunt     Cerebral aneurysm Maternal Grandmother     Early death Maternal Grandmother         fall hit head    Heart disease Maternal Grandfather     Cancer Paternal Grandmother     Pancreatic cancer Paternal Grandmother     Cancer Sister         cervix, abd     Melanoma Sister     Skin cancer Sister     Vaginal cancer Sister     Macular degeneration Neg Hx     Retinal detachment Neg Hx     Glaucoma Neg Hx     Psoriasis Neg Hx     Lupus Neg Hx     Eczema Neg Hx        Social History:   Social History     Socioeconomic History    Marital status:      Spouse name: Not on file    Number of children: Not on file    Years of education: Not on file    Highest education level: Not on file   Social Needs    Financial resource strain: Not on file    Food insecurity - worry: Not on file    Food insecurity - inability: Not on file    Transportation needs - medical: Not on file    Transportation needs - non-medical: Not on file   Occupational History    Occupation:      Employer: [employer]   Tobacco Use    Smoking status: Former  Smoker     Packs/day: 1.00     Years: 30.00     Pack years: 30.00     Types: Cigarettes     Last attempt to quit: 2014     Years since quittin.4    Smokeless tobacco: Former User     Types: Chew     Quit date: 1979   Substance and Sexual Activity    Alcohol use: Yes     Alcohol/week: 6.0 oz     Types: 12 Standard drinks or equivalent per week     Comment: Drinks beer only and mostly during football season    Drug use: No    Sexual activity: Not on file   Other Topics Concern    Not on file   Social History Narrative    Not on file       Hospitalization/Major Diagnostic Procedure:     Review of Systems     General/Constitutional:  Chills denies. Fatigue denies. Fever denies. Weight gain denies. Weight loss denies.    Respiratory:  Shortness of breath denies.    Cardiovascular:  Chest pain denies.    Gastrointestinal:  Constipation denies. Diarrhea denies. Nausea denies. Vomiting denies.     Hematology:  Easy bruising denies. Prolonged bleeding denies.     Genitourinary:  Frequent urination denies. Pain in lower back denies. Painful urination denies.     Musculoskeletal:  See HPI for details    Skin:  Rash denies.    Neurologic:  Dizziness denies. Gait abnormalities denies. Seizures denies. Tingling/Numbess denies.    Psychiatric:  Anxiety denies. Depressed mood denies.     Objective:   Vital Signs:   Vitals:    10/22/18 0810   BP: 118/70        Physical Exam      General Examination:     Constitutional: The patient is alert and oriented to lace person and time. Mood is pleasant.     Head/Face: Normal facial features normal eyebrows    Eyes: Normal extraocular motion bilaterally    Lungs: Respirations are equal and unlabored    Gait is coordinated.    Cardiovascular: There are no swelling or varicosities present.    Lymphatic: Negative for adenopathy    Skin: Normal    Neurological: Level of consciousness normal. Oriented to place person and time and situation    Psychiatric: Oriented to time place  person and situation    Patient can stand erect. He has pain when doing so. Bilateral paraspinous muscle spasm. Healed lumbar incision. Moderate pain with restriction of motion. Patella and Achilles reflexes symmetrical but hypoactive. Straight leg raising causes back pain. Patient has difficulty but is able to walk on his heels. Patient is able to walk on his toes. Pulses are intact.    XRAY Report/ Interpretation : AP pelvis x-ray was taken today. Indications low back pain and/or hip pain. Findings AP pelvis x-ray appears to be normal with no evidence of fractures or significant degenerative disease  AP and lateral x-rays of lumbar spine will performed today. Indications low back pain. Findings: Grade 2 spondylolisthesis L5-S1 noted. Midline laminectomy of L4 and L5.  Lumbar MRI 2011 was reviewed there is significant spinal canal stenosis at L1-L2 and L3 from 6-9 mm diameter. The MRI in 2011 also show bilateral foraminal stenosis at L5-S1.      Assessment:       1. Spinal stenosis, lumbar region, with neurogenic claudication    2. Lumbar radiculopathy, chronic    3. History of lumbar laminectomy    4. Spondylolisthesis at L5-S1 level        Plan:       Mal was seen today for pain.    Diagnoses and all orders for this visit:    Spinal stenosis, lumbar region, with neurogenic claudication  -     MRI Lumbar Spine W WO Cont    Lumbar radiculopathy, chronic  -     X-Ray Lumbar Spine Ap And Lateral  -     X-Ray Pelvis Routine AP; Future    History of lumbar laminectomy    Spondylolisthesis at L5-S1 level         Follow-up in about 3 weeks (around 11/12/2018) for MRI Lumbar Results.    Treatment options were discussed regards to the nature of the spinal condition conservative pain interventional and surgical options were discussed in detail and the probability of success of the separate treatment options was discussed in detail the patient expressed a clear understanding of the treatment options would regards to  surgery the procedure risks benefits complications and outcomes were discussed.  No guarantees were given regards to surgical outcome.  Lumbar mri with and without gadolinium is advised. Patient advised that the cause of his persistent symptoms may be on the basis of persistent stenosis at L1-2 and L3 which has never been addressed with surgery    This note was created using Dragon voice recognition software that occasionally misinterpreted phrases or words.

## 2018-10-25 ENCOUNTER — IMMUNIZATION (OUTPATIENT)
Dept: FAMILY MEDICINE | Facility: CLINIC | Age: 63
End: 2018-10-25
Payer: COMMERCIAL

## 2018-10-25 PROCEDURE — 90471 IMMUNIZATION ADMIN: CPT | Mod: S$GLB,,, | Performed by: INTERNAL MEDICINE

## 2018-10-25 PROCEDURE — 90686 IIV4 VACC NO PRSV 0.5 ML IM: CPT | Mod: S$GLB,,, | Performed by: INTERNAL MEDICINE

## 2018-10-25 NOTE — PROGRESS NOTES
Patient identified by name and  with verbal feedback. Flu Vaccine 0.5 mL administered IM to right deltoid using aseptic technique. Pt instructed to wait in clinic 15 minutes. Patient tolerated well, no adverse reactions noted/reported.

## 2018-10-26 LAB — ISTAT CREATININE: 0.8 MG/DL (ref 0.6–1.4)

## 2018-11-07 DIAGNOSIS — I10 ESSENTIAL HYPERTENSION: ICD-10-CM

## 2018-11-07 RX ORDER — FUROSEMIDE 20 MG/1
TABLET ORAL
Qty: 30 TABLET | Refills: 5 | Status: SHIPPED | OUTPATIENT
Start: 2018-11-07 | End: 2018-11-30

## 2018-11-14 ENCOUNTER — OFFICE VISIT (OUTPATIENT)
Dept: ORTHOPEDICS | Facility: CLINIC | Age: 63
End: 2018-11-14
Payer: COMMERCIAL

## 2018-11-14 VITALS
SYSTOLIC BLOOD PRESSURE: 120 MMHG | HEIGHT: 69 IN | BODY MASS INDEX: 27.4 KG/M2 | WEIGHT: 185 LBS | DIASTOLIC BLOOD PRESSURE: 70 MMHG

## 2018-11-14 DIAGNOSIS — M43.17 SPONDYLOLISTHESIS AT L5-S1 LEVEL: ICD-10-CM

## 2018-11-14 DIAGNOSIS — Z98.890 HISTORY OF LUMBAR LAMINECTOMY: ICD-10-CM

## 2018-11-14 DIAGNOSIS — M48.061 FORAMINAL STENOSIS OF LUMBAR REGION: ICD-10-CM

## 2018-11-14 DIAGNOSIS — M48.061 SPINAL STENOSIS OF LUMBAR REGION WITHOUT NEUROGENIC CLAUDICATION: Primary | ICD-10-CM

## 2018-11-14 DIAGNOSIS — M51.36 DISC DEGENERATION, LUMBAR: ICD-10-CM

## 2018-11-14 PROCEDURE — 3074F SYST BP LT 130 MM HG: CPT | Mod: ,,, | Performed by: ORTHOPAEDIC SURGERY

## 2018-11-14 PROCEDURE — 3008F BODY MASS INDEX DOCD: CPT | Mod: ,,, | Performed by: ORTHOPAEDIC SURGERY

## 2018-11-14 PROCEDURE — 99213 OFFICE O/P EST LOW 20 MIN: CPT | Mod: ,,, | Performed by: ORTHOPAEDIC SURGERY

## 2018-11-14 PROCEDURE — 3078F DIAST BP <80 MM HG: CPT | Mod: ,,, | Performed by: ORTHOPAEDIC SURGERY

## 2018-11-14 NOTE — PROGRESS NOTES
Subjective:       Patient ID: Mal Larkin is a 63 y.o. male.    Chief Complaint: Pain of the Lumbar Spine (MRI results)      History of Present Illness  Patient follows up for chronic low back pain secondary to lumbar spondylolisthesis and 2 previous laminectomies. He continues to have low back pain with bilateral lower extremity radiculitis and dysesthesia left greater than right. He has a lumbar MRI to review today. He feels like he is at the point in his life where he can no longer tolerate the pain and is interested in the possibility of surgical options to improve his quality of life    Current Medications  Current Outpatient Medications   Medication Sig Dispense Refill    furosemide (LASIX) 20 MG tablet TAKE 1 TABLET BY MOUTH EVERY DAY 30 tablet 5    multivitamin (ONE DAILY MULTIVITAMIN) per tablet Take 1 tablet by mouth once daily.       No current facility-administered medications for this visit.        Allergies  Review of patient's allergies indicates:  No Known Allergies    Past Medical History  Past Medical History:   Diagnosis Date    Adenomatous colon polyp     + dysplasia    Colon polyp     Fatty liver     Hypertension     Melanoma 2011    in situ - upper back       Surgical History  Past Surgical History:   Procedure Laterality Date    APPENDECTOMY      BACK SURGERY      transverse process fracture    BLOCK-NERVE-MEDIAL BRANCH-LUMBAR Bilateral 8/3/2017    [facility]    COLONOSCOPY  11/11/2013    Dr. Hayes, 3 year recheck    COLONOSCOPY N/A 12/20/2016    Procedure: COLONOSCOPY;  Surgeon: Atilio Hayes MD;  Location: [facility];  Service: Endoscopy;  Laterality: N/A;    COLONOSCOPY N/A 12/20/2016    [facility]O    COLONOSCOPY N/A 11/11/2013    [facility] at [facility]    COLONOSCOPY N/A 8/1/2013    Per[facility] at [facility]    GASTRIC BYPASS      HAND SURGERY      right  dupuytrens release on 8/5/13    LUMBAR LAMINECTOMY  2011    melanoma removal  2011    RADIOFREQUENCY THERMOCOAGULATION (RFTC)-NERVE-MEDIAN BRANCH-LUMBAR Bilateral 8/22/2017    [facility] OR    RELEASE DUPUYTREN'S CONTRACTURE Right 8/5/2013    [facility] OR    WISDOM TOOTH EXTRACTION         Family History:   Family History   Problem Relation Age of Onset    Skin cancer Mother     Ovarian cancer Mother     Heart disease Mother     Prostate cancer Father     Colon cancer Father     Cancer Father         glands    Melanoma Father     Melanoma Sister     Asthma Daughter     Fibromyalgia Daughter     Heart disease Maternal Uncle     Alcohol abuse Maternal Uncle     Cancer Paternal Aunt     Cerebral aneurysm Maternal Grandmother     Early death Maternal Grandmother         fall hit head    Heart disease Maternal Grandfather     Cancer Paternal Grandmother     Pancreatic cancer Paternal Grandmother     Cancer Sister         cervix, abd     Melanoma Sister     Skin cancer Sister     Vaginal cancer Sister     Macular degeneration Neg Hx     Retinal detachment Neg Hx     Glaucoma Neg Hx     Psoriasis Neg Hx     Lupus Neg Hx     Eczema Neg Hx        Social History:   Social History     Socioeconomic History    Marital status:      Spouse name: Not on file    Number of children: Not on file    Years of education: Not on file    Highest education level: Not on file   Social Needs    Financial resource strain: Not on file    Food insecurity - worry: Not on file    Food insecurity - inability: Not on file    Transportation needs - medical: Not on file    Transportation needs - non-medical: Not on file   Occupational History    Occupation:      Employer: [employer]   Tobacco Use    Smoking status: Former Smoker     Packs/day: 1.00     Years: 30.00     Pack years: 30.00     Types: Cigarettes     Last attempt to  quit: 2014     Years since quittin.5    Smokeless tobacco: Former User     Types: Chew     Quit date: 1979   Substance and Sexual Activity    Alcohol use: Yes     Alcohol/week: 6.0 oz     Types: 12 Standard drinks or equivalent per week     Comment: Drinks beer only and mostly during football season    Drug use: No    Sexual activity: Not on file   Other Topics Concern    Not on file   Social History Narrative    Not on file       Hospitalization/Major Diagnostic Procedure:     Review of Systems     General/Constitutional:  Chills denies. Fatigue denies. Fever denies. Weight gain denies. Weight loss denies.    Respiratory:  Shortness of breath denies.    Cardiovascular:  Chest pain denies.    Gastrointestinal:  Constipation denies. Diarrhea denies. Nausea denies. Vomiting denies.     Hematology:  Easy bruising denies. Prolonged bleeding denies.     Genitourinary:  Frequent urination denies. Pain in lower back denies. Painful urination denies.     Musculoskeletal:  See HPI for details    Skin:  Rash denies.    Neurologic:  Dizziness denies. Gait abnormalities denies. Seizures denies. Tingling/Numbess denies.    Psychiatric:  Anxiety denies. Depressed mood denies.     Objective:   Vital Signs:   Vitals:    18 0838   BP: 120/70        Physical Exam      General Examination:     Constitutional: The patient is alert and oriented to lace person and time. Mood is pleasant.     Head/Face: Normal facial features normal eyebrows    Eyes: Normal extraocular motion bilaterally    Lungs: Respirations are equal and unlabored    Gait is coordinated.    Cardiovascular: There are no swelling or varicosities present.    Lymphatic: Negative for adenopathy    Skin: Normal    Neurological: Level of consciousness normal. Oriented to place person and time and situation    Psychiatric: Oriented to time place person and situation   lumbar physical examination shows well-healed incision is moderate tenderness and  bilateral paraspinous muscle spasm. Range of motion is moderately restricted. Straight leg raising maneuver is positive on the left side. Patient has motor weakness in the L3-L4 and L5 nerve root distribution on the left side and on the right side and L3 and L4 distribution. Hip and knee range of motion is normal. Peripheral pulses are intact.  t/ Interpretation: Lumbar MRI study was personally reviewed patient has evidence of previous laminectomy at L4 and L5 there is bilateral foraminal stenosis at the L4-5 level caused by facet joint hypertrophy. There are significant central and foraminal stenosis at the L1-2 L2-3 and L3-4 levels with concentric disc protrusions at those levels please see report for full details there is a grade 2 spondylolisthesis of L5 on S1 with marked disc space narrowing at the L5-S1 level      Assessment:       1. Spinal stenosis of lumbar region without neurogenic claudication    2. History of lumbar laminectomy    3. Spondylolisthesis at L5-S1 level    4. Foraminal stenosis of lumbar region    5. Disc degeneration, lumbar        Plan:       Mal was seen today for pain.    Diagnoses and all orders for this visit:    Spinal stenosis of lumbar region without neurogenic claudication    History of lumbar laminectomy  Comments:  L4 and L5 levels    Spondylolisthesis at L5-S1 level    Foraminal stenosis of lumbar region  Comments:  L1-2 L2-3 L3-4 and L4-5 levels    Disc degeneration, lumbar  Comments:  L1-2 L2-3 L3-4 L4-5 and L5-S1         No Follow-up on file.    Treatment options were discussed regards to the nature of the spinal condition conservative pain interventional and surgical options were discussed in detail and the probability of success of the separate treatment options was discussed in detail the patient expressed a clear understanding of the treatment options would regards to surgery the procedure risks benefits complications and outcomes were discussed.  No guarantees were  given regards to surgical outcome.  The risk associated with the spinal condition and an worsening of the condition was discussed with the patient expressed a thorough understanding.  The patient was warned about the possible development of cauda equina syndrome and its symptoms which include but are not limited to bowel or bladder dysfunction sexual dysfunction increasing pain increasing extremity numbness or weakness and saddle anesthesia.  The patient was advised to either contact me immediately or to go to the nearest hospital emergency room if symptoms of cauda equina syndrome with to develop.  The patient expressed an understanding of these instructions.  The technical aspects of the surgery were discussed in detail with the patient today. The patient was able to verbalize an understanding. The procedure risk benefits alternatives and possible complications of the surgical procedure was discussed including expected outcomes. No guarantees were given regards to outcomes. Consent forms were will be signed at a later date. All questions regarding the surgery itself were answered. The patient wishes to proceed with surgery and will be scheduled. The patient may require preoperative medical clearance.  I had a lengthy discussion with both the patient and his son-in-law Dr. Hernando Cantrell that is an acquaintance of mine. He has a quite difficult problem. There is persistent foraminal stenosis at L4-5. I don't think there is any significant stenosis at L5-S1 in the area of his L5-S1 spondylolisthesis. There is significant foraminal stenosis at L1-2 L2-3 and L3-4. In order to address all of his symptoms he needs to have an extensive procedure. He does have significant neurogenic claudication symptoms and pain and weakness in his thighs. He require the following  #1 laminectomy L1,L2 and L3 with foraminotomies and partial discectomies as necessary  #2 repeat foraminotomies at the L4-5 levels bilaterally to decompress  the L4 nerve roots in the foraminal zones  Posterior lateral fusion from L1 to the sacrum  Instrumentation with pedicle screws from L1 to the sacrum    The patient feels the pains are intolerable and would like to proceed with surgery    This note was created using Dragon voice recognition software that occasionally misinterpreted phrases or words.

## 2018-11-14 NOTE — H&P (VIEW-ONLY)
Subjective:       Patient ID: Mal Larkin is a 63 y.o. male.    Chief Complaint: Pain of the Lumbar Spine (MRI results)      History of Present Illness  Patient follows up for chronic low back pain secondary to lumbar spondylolisthesis and 2 previous laminectomies. He continues to have low back pain with bilateral lower extremity radiculitis and dysesthesia left greater than right. He has a lumbar MRI to review today. He feels like he is at the point in his life where he can no longer tolerate the pain and is interested in the possibility of surgical options to improve his quality of life    Current Medications  Current Outpatient Medications   Medication Sig Dispense Refill    furosemide (LASIX) 20 MG tablet TAKE 1 TABLET BY MOUTH EVERY DAY 30 tablet 5    multivitamin (ONE DAILY MULTIVITAMIN) per tablet Take 1 tablet by mouth once daily.       No current facility-administered medications for this visit.        Allergies  Review of patient's allergies indicates:  No Known Allergies    Past Medical History  Past Medical History:   Diagnosis Date    Adenomatous colon polyp     + dysplasia    Colon polyp     Fatty liver     Hypertension     Melanoma 2011    in situ - upper back       Surgical History  Past Surgical History:   Procedure Laterality Date    APPENDECTOMY      BACK SURGERY      transverse process fracture    BLOCK-NERVE-MEDIAL BRANCH-LUMBAR Bilateral 8/3/2017    Per[facility]    COLONOSCOPY  11/11/2013    Dr. Hayes, 3 year recheck    COLONOSCOPY N/A 12/20/2016    Procedure: COLONOSCOPY;  Surgeon: Atilio Hayes MD;  Location: [facility];  Service: Endoscopy;  Laterality: N/A;    COLONOSCOPY N/A 12/20/2016    [facility]O    COLONOSCOPY N/A 11/11/2013    [facility] at [facility]    COLONOSCOPY N/A 8/1/2013    [facility] at [facility]    GASTRIC BYPASS      HAND SURGERY      right  dupuytrens release on 8/5/13    LUMBAR LAMINECTOMY  2011    melanoma removal  2011    RADIOFREQUENCY THERMOCOAGULATION ([facility])-NERVE-MEDIAN BRANCH-LUMBAR Bilateral 8/22/2017    [facility] OR    RELEASE DUPUYTREN'S CONTRACTURE Right 8/5/2013    [facility]    WISDOM TOOTH EXTRACTION         Family History:   Family History   Problem Relation Age of Onset    Skin cancer Mother     Ovarian cancer Mother     Heart disease Mother     Prostate cancer Father     Colon cancer Father     Cancer Father         glands    Melanoma Father     Melanoma Sister     Asthma Daughter     Fibromyalgia Daughter     Heart disease Maternal Uncle     Alcohol abuse Maternal Uncle     Cancer Paternal Aunt     Cerebral aneurysm Maternal Grandmother     Early death Maternal Grandmother         fall hit head    Heart disease Maternal Grandfather     Cancer Paternal Grandmother     Pancreatic cancer Paternal Grandmother     Cancer Sister         cervix, abd     Melanoma Sister     Skin cancer Sister     Vaginal cancer Sister     Macular degeneration Neg Hx     Retinal detachment Neg Hx     Glaucoma Neg Hx     Psoriasis Neg Hx     Lupus Neg Hx     Eczema Neg Hx        Social History:   Social History     Socioeconomic History    Marital status:      Spouse name: Not on file    Number of children: Not on file    Years of education: Not on file    Highest education level: Not on file   Social Needs    Financial resource strain: Not on file    Food insecurity - worry: Not on file    Food insecurity - inability: Not on file    Transportation needs - medical: Not on file    Transportation needs - non-medical: Not on file   Occupational History    Occupation:      Employer: [employer]   Tobacco Use    Smoking status: Former Smoker     Packs/day: 1.00     Years: 30.00     Pack years: 30.00     Types: Cigarettes     Last attempt to  quit: 2014     Years since quittin.5    Smokeless tobacco: Former User     Types: Chew     Quit date: 1979   Substance and Sexual Activity    Alcohol use: Yes     Alcohol/week: 6.0 oz     Types: 12 Standard drinks or equivalent per week     Comment: Drinks beer only and mostly during football season    Drug use: No    Sexual activity: Not on file   Other Topics Concern    Not on file   Social History Narrative    Not on file       Hospitalization/Major Diagnostic Procedure:     Review of Systems     General/Constitutional:  Chills denies. Fatigue denies. Fever denies. Weight gain denies. Weight loss denies.    Respiratory:  Shortness of breath denies.    Cardiovascular:  Chest pain denies.    Gastrointestinal:  Constipation denies. Diarrhea denies. Nausea denies. Vomiting denies.     Hematology:  Easy bruising denies. Prolonged bleeding denies.     Genitourinary:  Frequent urination denies. Pain in lower back denies. Painful urination denies.     Musculoskeletal:  See HPI for details    Skin:  Rash denies.    Neurologic:  Dizziness denies. Gait abnormalities denies. Seizures denies. Tingling/Numbess denies.    Psychiatric:  Anxiety denies. Depressed mood denies.     Objective:   Vital Signs:   Vitals:    18 0838   BP: 120/70        Physical Exam      General Examination:     Constitutional: The patient is alert and oriented to lace person and time. Mood is pleasant.     Head/Face: Normal facial features normal eyebrows    Eyes: Normal extraocular motion bilaterally    Lungs: Respirations are equal and unlabored    Gait is coordinated.    Cardiovascular: There are no swelling or varicosities present.    Lymphatic: Negative for adenopathy    Skin: Normal    Neurological: Level of consciousness normal. Oriented to place person and time and situation    Psychiatric: Oriented to time place person and situation   lumbar physical examination shows well-healed incision is moderate tenderness and  bilateral paraspinous muscle spasm. Range of motion is moderately restricted. Straight leg raising maneuver is positive on the left side. Patient has motor weakness in the L3-L4 and L5 nerve root distribution on the left side and on the right side and L3 and L4 distribution. Hip and knee range of motion is normal. Peripheral pulses are intact.  t/ Interpretation: Lumbar MRI study was personally reviewed patient has evidence of previous laminectomy at L4 and L5 there is bilateral foraminal stenosis at the L4-5 level caused by facet joint hypertrophy. There are significant central and foraminal stenosis at the L1-2 L2-3 and L3-4 levels with concentric disc protrusions at those levels please see report for full details there is a grade 2 spondylolisthesis of L5 on S1 with marked disc space narrowing at the L5-S1 level      Assessment:       1. Spinal stenosis of lumbar region without neurogenic claudication    2. History of lumbar laminectomy    3. Spondylolisthesis at L5-S1 level    4. Foraminal stenosis of lumbar region    5. Disc degeneration, lumbar        Plan:       Mal was seen today for pain.    Diagnoses and all orders for this visit:    Spinal stenosis of lumbar region without neurogenic claudication    History of lumbar laminectomy  Comments:  L4 and L5 levels    Spondylolisthesis at L5-S1 level    Foraminal stenosis of lumbar region  Comments:  L1-2 L2-3 L3-4 and L4-5 levels    Disc degeneration, lumbar  Comments:  L1-2 L2-3 L3-4 L4-5 and L5-S1         No Follow-up on file.    Treatment options were discussed regards to the nature of the spinal condition conservative pain interventional and surgical options were discussed in detail and the probability of success of the separate treatment options was discussed in detail the patient expressed a clear understanding of the treatment options would regards to surgery the procedure risks benefits complications and outcomes were discussed.  No guarantees were  given regards to surgical outcome.  The risk associated with the spinal condition and an worsening of the condition was discussed with the patient expressed a thorough understanding.  The patient was warned about the possible development of cauda equina syndrome and its symptoms which include but are not limited to bowel or bladder dysfunction sexual dysfunction increasing pain increasing extremity numbness or weakness and saddle anesthesia.  The patient was advised to either contact me immediately or to go to the nearest hospital emergency room if symptoms of cauda equina syndrome with to develop.  The patient expressed an understanding of these instructions.  The technical aspects of the surgery were discussed in detail with the patient today. The patient was able to verbalize an understanding. The procedure risk benefits alternatives and possible complications of the surgical procedure was discussed including expected outcomes. No guarantees were given regards to outcomes. Consent forms were will be signed at a later date. All questions regarding the surgery itself were answered. The patient wishes to proceed with surgery and will be scheduled. The patient may require preoperative medical clearance.  I had a lengthy discussion with both the patient and his son-in-law Dr. Hernando Cantrell that is an acquaintance of mine. He has a quite difficult problem. There is persistent foraminal stenosis at L4-5. I don't think there is any significant stenosis at L5-S1 in the area of his L5-S1 spondylolisthesis. There is significant foraminal stenosis at L1-2 L2-3 and L3-4. In order to address all of his symptoms he needs to have an extensive procedure. He does have significant neurogenic claudication symptoms and pain and weakness in his thighs. He require the following  #1 laminectomy L1,L2 and L3 with foraminotomies and partial discectomies as necessary  #2 repeat foraminotomies at the L4-5 levels bilaterally to decompress  the L4 nerve roots in the foraminal zones  Posterior lateral fusion from L1 to the sacrum  Instrumentation with pedicle screws from L1 to the sacrum    The patient feels the pains are intolerable and would like to proceed with surgery    This note was created using Dragon voice recognition software that occasionally misinterpreted phrases or words.

## 2018-11-16 ENCOUNTER — PATIENT OUTREACH (OUTPATIENT)
Dept: OTHER | Facility: OTHER | Age: 63
End: 2018-11-16

## 2018-11-16 NOTE — PROGRESS NOTES
Last 5 Patient Entered Readings                                      Current 30 Day Average: 121/73     Recent Readings 11/13/2018 11/13/2018 11/11/2018 11/11/2018 11/6/2018    SBP (mmHg) 120 131 116 133 110    DBP (mmHg) 68 80 65 75 66    Pulse 55 57 72 77 70        11/16-[facility]: [facility]  Follow Up  Left voicemail to follow up with Mr. Mal Larkin.  Current BP average 121/73 mmHg is at goal, [<130/80].

## 2018-11-17 DIAGNOSIS — I10 ESSENTIAL HYPERTENSION: ICD-10-CM

## 2018-11-18 NOTE — TELEPHONE ENCOUNTER
The requested medication is not on the patient's current medication list. Please check with patient. Did he request this prescription?   Thanks  Yoselin

## 2018-11-19 DIAGNOSIS — M51.36 DDD (DEGENERATIVE DISC DISEASE), LUMBAR: ICD-10-CM

## 2018-11-19 DIAGNOSIS — M48.062 SPINAL STENOSIS, LUMBAR REGION, WITH NEUROGENIC CLAUDICATION: Primary | ICD-10-CM

## 2018-11-19 DIAGNOSIS — M43.17 SPONDYLOLISTHESIS AT L5-S1 LEVEL: ICD-10-CM

## 2018-11-19 RX ORDER — CHLORTHALIDONE 25 MG/1
TABLET ORAL
Qty: 30 TABLET | Refills: 5 | OUTPATIENT
Start: 2018-11-19

## 2018-11-30 ENCOUNTER — HOSPITAL ENCOUNTER (OUTPATIENT)
Dept: RADIOLOGY | Facility: HOSPITAL | Age: 63
Discharge: HOME | End: 2018-11-30
Attending: ORTHOPAEDIC SURGERY
Payer: COMMERCIAL

## 2018-11-30 ENCOUNTER — HOSPITAL ENCOUNTER (OUTPATIENT)
Dept: PREADMISSION TESTING | Facility: HOSPITAL | Age: 63
Discharge: HOME | End: 2018-11-30
Attending: ORTHOPAEDIC SURGERY
Payer: COMMERCIAL

## 2018-11-30 VITALS — HEIGHT: 68 IN | BODY MASS INDEX: 27.74 KG/M2 | WEIGHT: 183 LBS

## 2018-11-30 DIAGNOSIS — M43.17 SPONDYLOLISTHESIS AT L5-S1 LEVEL: ICD-10-CM

## 2018-11-30 DIAGNOSIS — M48.062 SPINAL STENOSIS, LUMBAR REGION, WITH NEUROGENIC CLAUDICATION: ICD-10-CM

## 2018-11-30 DIAGNOSIS — M51.36 DDD (DEGENERATIVE DISC DISEASE), LUMBAR: ICD-10-CM

## 2018-11-30 DIAGNOSIS — M51.36 DDD (DEGENERATIVE DISC DISEASE), LUMBAR: Primary | ICD-10-CM

## 2018-11-30 LAB
ABO + RH BLD: NORMAL
ALBUMIN SERPL BCP-MCNC: 4 G/DL
ALP SERPL-CCNC: 76 U/L
ALT SERPL W/O P-5'-P-CCNC: 16 U/L
ANION GAP SERPL CALC-SCNC: 8 MMOL/L
APTT BLDCRRT: 25.4 SEC
AST SERPL-CCNC: 21 U/L
BASOPHILS # BLD AUTO: 0 K/UL
BASOPHILS NFR BLD: 0.5 %
BILIRUB SERPL-MCNC: 0.5 MG/DL
BILIRUB UR QL STRIP: NEGATIVE
BLD GP AB SCN CELLS X3 SERPL QL: NORMAL
BUN SERPL-MCNC: 12 MG/DL
CALCIUM SERPL-MCNC: 9.6 MG/DL
CHLORIDE SERPL-SCNC: 100 MMOL/L
CLARITY UR: CLEAR
CO2 SERPL-SCNC: 31 MMOL/L
COLOR UR: YELLOW
CREAT SERPL-MCNC: 0.8 MG/DL
DIFFERENTIAL METHOD: ABNORMAL
EOSINOPHIL # BLD AUTO: 0.1 K/UL
EOSINOPHIL NFR BLD: 1 %
ERYTHROCYTE [DISTWIDTH] IN BLOOD BY AUTOMATED COUNT: 14.4 %
EST. GFR  (AFRICAN AMERICAN): >60 ML/MIN/1.73 M^2
EST. GFR  (NON AFRICAN AMERICAN): >60 ML/MIN/1.73 M^2
GLUCOSE SERPL-MCNC: 96 MG/DL
GLUCOSE UR QL STRIP: NEGATIVE
HCT VFR BLD AUTO: 41.1 %
HGB BLD-MCNC: 13.9 G/DL
HGB UR QL STRIP: NEGATIVE
INR PPP: 1
KETONES UR QL STRIP: NEGATIVE
LEUKOCYTE ESTERASE UR QL STRIP: NEGATIVE
LYMPHOCYTES # BLD AUTO: 1.3 K/UL
LYMPHOCYTES NFR BLD: 21.7 %
MCH RBC QN AUTO: 32 PG
MCHC RBC AUTO-ENTMCNC: 33.9 G/DL
MCV RBC AUTO: 95 FL
MONOCYTES # BLD AUTO: 0.4 K/UL
MONOCYTES NFR BLD: 7.3 %
NEUTROPHILS # BLD AUTO: 4.2 K/UL
NEUTROPHILS NFR BLD: 69.5 %
NITRITE UR QL STRIP: NEGATIVE
PH UR STRIP: 8 [PH] (ref 5–8)
PLATELET # BLD AUTO: 247 K/UL
PMV BLD AUTO: 7.9 FL
POTASSIUM SERPL-SCNC: 3.9 MMOL/L
PROT SERPL-MCNC: 6.6 G/DL
PROT UR QL STRIP: NEGATIVE
PROTHROMBIN TIME: 10.8 SEC
RBC # BLD AUTO: 4.34 M/UL
SODIUM SERPL-SCNC: 139 MMOL/L
SP GR UR STRIP: 1.01 (ref 1–1.03)
URN SPEC COLLECT METH UR: NORMAL
UROBILINOGEN UR STRIP-ACNC: 1 EU/DL
WBC # BLD AUTO: 6.1 K/UL

## 2018-11-30 PROCEDURE — 99900104 DSU ONLY-NO CHARGE-EA ADD'L HR (STAT)

## 2018-11-30 PROCEDURE — 85025 COMPLETE CBC W/AUTO DIFF WBC: CPT

## 2018-11-30 PROCEDURE — 72100 X-RAY EXAM L-S SPINE 2/3 VWS: CPT | Mod: 26,,, | Performed by: RADIOLOGY

## 2018-11-30 PROCEDURE — 71046 X-RAY EXAM CHEST 2 VIEWS: CPT | Mod: 26,,, | Performed by: RADIOLOGY

## 2018-11-30 PROCEDURE — 81003 URINALYSIS AUTO W/O SCOPE: CPT

## 2018-11-30 PROCEDURE — 71046 X-RAY EXAM CHEST 2 VIEWS: CPT | Mod: TC,FY

## 2018-11-30 PROCEDURE — 85610 PROTHROMBIN TIME: CPT

## 2018-11-30 PROCEDURE — 99900103 DSU ONLY-NO CHARGE-INITIAL HR (STAT)

## 2018-11-30 PROCEDURE — 72100 X-RAY EXAM L-S SPINE 2/3 VWS: CPT | Mod: TC,FY

## 2018-11-30 PROCEDURE — 93010 ELECTROCARDIOGRAM REPORT: CPT | Mod: ,,, | Performed by: INTERNAL MEDICINE

## 2018-11-30 PROCEDURE — 36415 COLL VENOUS BLD VENIPUNCTURE: CPT

## 2018-11-30 PROCEDURE — 85730 THROMBOPLASTIN TIME PARTIAL: CPT

## 2018-11-30 PROCEDURE — 93005 ELECTROCARDIOGRAM TRACING: CPT

## 2018-11-30 PROCEDURE — 86901 BLOOD TYPING SEROLOGIC RH(D): CPT

## 2018-11-30 PROCEDURE — 80053 COMPREHEN METABOLIC PANEL: CPT

## 2018-11-30 RX ORDER — FERROUS SULFATE, DRIED 160(50) MG
1 TABLET, EXTENDED RELEASE ORAL DAILY
COMMUNITY

## 2018-11-30 RX ORDER — NAPROXEN SODIUM 220 MG
220 TABLET ORAL
COMMUNITY
End: 2019-01-08 | Stop reason: ALTCHOICE

## 2018-11-30 NOTE — DISCHARGE INSTRUCTIONS

## 2018-12-10 ENCOUNTER — ANESTHESIA EVENT (OUTPATIENT)
Dept: SURGERY | Facility: HOSPITAL | Age: 63
DRG: 460 | End: 2018-12-10
Payer: COMMERCIAL

## 2018-12-11 ENCOUNTER — ANESTHESIA (OUTPATIENT)
Dept: SURGERY | Facility: HOSPITAL | Age: 63
DRG: 460 | End: 2018-12-11
Payer: COMMERCIAL

## 2018-12-11 ENCOUNTER — HOSPITAL ENCOUNTER (INPATIENT)
Facility: HOSPITAL | Age: 63
LOS: 3 days | Discharge: HOME | DRG: 460 | End: 2018-12-14
Attending: ORTHOPAEDIC SURGERY | Admitting: ORTHOPAEDIC SURGERY
Payer: COMMERCIAL

## 2018-12-11 DIAGNOSIS — E66.9 OBESITY (BMI 30-39.9): ICD-10-CM

## 2018-12-11 DIAGNOSIS — M48.062 SPINAL STENOSIS, LUMBAR REGION, WITH NEUROGENIC CLAUDICATION: ICD-10-CM

## 2018-12-11 DIAGNOSIS — M51.36 DDD (DEGENERATIVE DISC DISEASE), LUMBAR: ICD-10-CM

## 2018-12-11 DIAGNOSIS — M43.17 SPONDYLOLISTHESIS AT L5-S1 LEVEL: ICD-10-CM

## 2018-12-11 DIAGNOSIS — I10 ESSENTIAL HYPERTENSION: Primary | ICD-10-CM

## 2018-12-11 LAB
ANION GAP SERPL CALC-SCNC: 10 MMOL/L
BASOPHILS # BLD AUTO: 0 K/UL
BASOPHILS NFR BLD: 0.1 %
BUN SERPL-MCNC: 11 MG/DL
CALCIUM SERPL-MCNC: 8.4 MG/DL
CHLORIDE SERPL-SCNC: 106 MMOL/L
CO2 SERPL-SCNC: 24 MMOL/L
CREAT SERPL-MCNC: 0.7 MG/DL
DIFFERENTIAL METHOD: ABNORMAL
EOSINOPHIL # BLD AUTO: 0 K/UL
EOSINOPHIL NFR BLD: 0 %
ERYTHROCYTE [DISTWIDTH] IN BLOOD BY AUTOMATED COUNT: 14.1 %
EST. GFR  (AFRICAN AMERICAN): >60 ML/MIN/1.73 M^2
EST. GFR  (NON AFRICAN AMERICAN): >60 ML/MIN/1.73 M^2
GLUCOSE SERPL-MCNC: 145 MG/DL
HCT VFR BLD AUTO: 35.6 %
HGB BLD-MCNC: 11.9 G/DL
LYMPHOCYTES # BLD AUTO: 0.5 K/UL
LYMPHOCYTES NFR BLD: 3.8 %
MCH RBC QN AUTO: 32 PG
MCHC RBC AUTO-ENTMCNC: 33.5 G/DL
MCV RBC AUTO: 96 FL
MONOCYTES # BLD AUTO: 0.4 K/UL
MONOCYTES NFR BLD: 3.1 %
NEUTROPHILS # BLD AUTO: 12.8 K/UL
NEUTROPHILS NFR BLD: 93 %
PLATELET # BLD AUTO: 181 K/UL
PMV BLD AUTO: 7.7 FL
POTASSIUM SERPL-SCNC: 4.4 MMOL/L
RBC # BLD AUTO: 3.73 M/UL
SODIUM SERPL-SCNC: 140 MMOL/L
WBC # BLD AUTO: 13.8 K/UL

## 2018-12-11 PROCEDURE — 99900104 DSU ONLY-NO CHARGE-EA ADD'L HR (STAT): Performed by: ORTHOPAEDIC SURGERY

## 2018-12-11 PROCEDURE — 0SG3071 FUSION OF LUMBOSACRAL JOINT WITH AUTOLOGOUS TISSUE SUBSTITUTE, POSTERIOR APPROACH, POSTERIOR COLUMN, OPEN APPROACH: ICD-10-PCS | Performed by: ORTHOPAEDIC SURGERY

## 2018-12-11 PROCEDURE — 94761 N-INVAS EAR/PLS OXIMETRY MLT: CPT

## 2018-12-11 PROCEDURE — 80048 BASIC METABOLIC PNL TOTAL CA: CPT

## 2018-12-11 PROCEDURE — 25000003 PHARM REV CODE 250: Performed by: PHYSICIAN ASSISTANT

## 2018-12-11 PROCEDURE — 63600175 PHARM REV CODE 636 W HCPCS: Performed by: ORTHOPAEDIC SURGERY

## 2018-12-11 PROCEDURE — D9220A PRA ANESTHESIA: Mod: ANES,,, | Performed by: ANESTHESIOLOGY

## 2018-12-11 PROCEDURE — 25000003 PHARM REV CODE 250: Performed by: NURSE ANESTHETIST, CERTIFIED REGISTERED

## 2018-12-11 PROCEDURE — 99900103 DSU ONLY-NO CHARGE-INITIAL HR (STAT): Performed by: ORTHOPAEDIC SURGERY

## 2018-12-11 PROCEDURE — 36415 COLL VENOUS BLD VENIPUNCTURE: CPT

## 2018-12-11 PROCEDURE — 71000039 HC RECOVERY, EACH ADD'L HOUR: Performed by: ORTHOPAEDIC SURGERY

## 2018-12-11 PROCEDURE — 25000003 PHARM REV CODE 250: Performed by: ORTHOPAEDIC SURGERY

## 2018-12-11 PROCEDURE — 25000003 PHARM REV CODE 250: Performed by: INTERNAL MEDICINE

## 2018-12-11 PROCEDURE — 36000711: Performed by: ORTHOPAEDIC SURGERY

## 2018-12-11 PROCEDURE — 94799 UNLISTED PULMONARY SVC/PX: CPT

## 2018-12-11 PROCEDURE — 36000710: Performed by: ORTHOPAEDIC SURGERY

## 2018-12-11 PROCEDURE — 63600175 PHARM REV CODE 636 W HCPCS: Performed by: NURSE ANESTHETIST, CERTIFIED REGISTERED

## 2018-12-11 PROCEDURE — C1713 ANCHOR/SCREW BN/BN,TIS/BN: HCPCS | Performed by: ORTHOPAEDIC SURGERY

## 2018-12-11 PROCEDURE — 99900035 HC TECH TIME PER 15 MIN (STAT)

## 2018-12-11 PROCEDURE — P9045 ALBUMIN (HUMAN), 5%, 250 ML: HCPCS | Performed by: NURSE ANESTHETIST, CERTIFIED REGISTERED

## 2018-12-11 PROCEDURE — 27201423 OPTIME MED/SURG SUP & DEVICES STERILE SUPPLY: Performed by: ORTHOPAEDIC SURGERY

## 2018-12-11 PROCEDURE — 25000003 PHARM REV CODE 250: Performed by: ANESTHESIOLOGY

## 2018-12-11 PROCEDURE — 63600175 PHARM REV CODE 636 W HCPCS: Performed by: PHYSICIAN ASSISTANT

## 2018-12-11 PROCEDURE — 4A11X4G MONITORING OF PERIPHERAL NERVOUS ELECTRICAL ACTIVITY, INTRAOPERATIVE, EXTERNAL APPROACH: ICD-10-PCS | Performed by: ORTHOPAEDIC SURGERY

## 2018-12-11 PROCEDURE — 12000002 HC ACUTE/MED SURGE SEMI-PRIVATE ROOM

## 2018-12-11 PROCEDURE — 94770 HC EXHALED C02 TEST: CPT

## 2018-12-11 PROCEDURE — C1729 CATH, DRAINAGE: HCPCS | Performed by: ORTHOPAEDIC SURGERY

## 2018-12-11 PROCEDURE — 85025 COMPLETE CBC W/AUTO DIFF WBC: CPT

## 2018-12-11 PROCEDURE — 0SG1071 FUSION OF 2 OR MORE LUMBAR VERTEBRAL JOINTS WITH AUTOLOGOUS TISSUE SUBSTITUTE, POSTERIOR APPROACH, POSTERIOR COLUMN, OPEN APPROACH: ICD-10-PCS | Performed by: ORTHOPAEDIC SURGERY

## 2018-12-11 PROCEDURE — 37000008 HC ANESTHESIA 1ST 15 MINUTES: Performed by: ORTHOPAEDIC SURGERY

## 2018-12-11 PROCEDURE — 01NB0ZZ RELEASE LUMBAR NERVE, OPEN APPROACH: ICD-10-PCS | Performed by: ORTHOPAEDIC SURGERY

## 2018-12-11 PROCEDURE — 71000033 HC RECOVERY, INTIAL HOUR: Performed by: ORTHOPAEDIC SURGERY

## 2018-12-11 PROCEDURE — 99222 1ST HOSP IP/OBS MODERATE 55: CPT | Mod: ,,, | Performed by: INTERNAL MEDICINE

## 2018-12-11 PROCEDURE — 63600175 PHARM REV CODE 636 W HCPCS: Performed by: ANESTHESIOLOGY

## 2018-12-11 PROCEDURE — D9220A PRA ANESTHESIA: Mod: CRNA,,, | Performed by: NURSE ANESTHETIST, CERTIFIED REGISTERED

## 2018-12-11 PROCEDURE — 27000221 HC OXYGEN, UP TO 24 HOURS

## 2018-12-11 PROCEDURE — 37000009 HC ANESTHESIA EA ADD 15 MINS: Performed by: ORTHOPAEDIC SURGERY

## 2018-12-11 DEVICE — IMPLANTABLE DEVICE: Type: IMPLANTABLE DEVICE | Site: BACK | Status: FUNCTIONAL

## 2018-12-11 RX ORDER — DEXAMETHASONE SODIUM PHOSPHATE 4 MG/ML
INJECTION, SOLUTION INTRA-ARTICULAR; INTRALESIONAL; INTRAMUSCULAR; INTRAVENOUS; SOFT TISSUE
Status: DISCONTINUED | OUTPATIENT
Start: 2018-12-11 | End: 2018-12-11

## 2018-12-11 RX ORDER — LIDOCAINE HYDROCHLORIDE 10 MG/ML
1 INJECTION, SOLUTION EPIDURAL; INFILTRATION; INTRACAUDAL; PERINEURAL ONCE
Status: DISCONTINUED | OUTPATIENT
Start: 2018-12-11 | End: 2018-12-11 | Stop reason: HOSPADM

## 2018-12-11 RX ORDER — HYDROMORPHONE HYDROCHLORIDE 2 MG/ML
0.2 INJECTION, SOLUTION INTRAMUSCULAR; INTRAVENOUS; SUBCUTANEOUS EVERY 5 MIN PRN
Status: DISCONTINUED | OUTPATIENT
Start: 2018-12-11 | End: 2018-12-11 | Stop reason: HOSPADM

## 2018-12-11 RX ORDER — BACITRACIN 50000 [IU]/1
INJECTION, POWDER, FOR SOLUTION INTRAMUSCULAR
Status: DISCONTINUED | OUTPATIENT
Start: 2018-12-11 | End: 2018-12-11 | Stop reason: HOSPADM

## 2018-12-11 RX ORDER — HYDROMORPHONE HYDROCHLORIDE 2 MG/ML
2 INJECTION, SOLUTION INTRAMUSCULAR; INTRAVENOUS; SUBCUTANEOUS
Status: DISCONTINUED | OUTPATIENT
Start: 2018-12-11 | End: 2018-12-13

## 2018-12-11 RX ORDER — MUPIROCIN 20 MG/G
1 OINTMENT TOPICAL 2 TIMES DAILY
Status: DISCONTINUED | OUTPATIENT
Start: 2018-12-11 | End: 2018-12-14 | Stop reason: HOSPADM

## 2018-12-11 RX ORDER — VECURONIUM BROMIDE FOR INJECTION 1 MG/ML
INJECTION, POWDER, LYOPHILIZED, FOR SOLUTION INTRAVENOUS
Status: DISCONTINUED | OUTPATIENT
Start: 2018-12-11 | End: 2018-12-11

## 2018-12-11 RX ORDER — HYDROMORPHONE HYDROCHLORIDE 2 MG/ML
INJECTION, SOLUTION INTRAMUSCULAR; INTRAVENOUS; SUBCUTANEOUS
Status: DISCONTINUED | OUTPATIENT
Start: 2018-12-11 | End: 2018-12-11

## 2018-12-11 RX ORDER — ALBUMIN HUMAN 50 G/1000ML
SOLUTION INTRAVENOUS CONTINUOUS PRN
Status: DISCONTINUED | OUTPATIENT
Start: 2018-12-11 | End: 2018-12-11

## 2018-12-11 RX ORDER — ONDANSETRON 2 MG/ML
4 INJECTION INTRAMUSCULAR; INTRAVENOUS DAILY PRN
Status: DISCONTINUED | OUTPATIENT
Start: 2018-12-11 | End: 2018-12-11 | Stop reason: HOSPADM

## 2018-12-11 RX ORDER — MIDAZOLAM HYDROCHLORIDE 1 MG/ML
INJECTION, SOLUTION INTRAMUSCULAR; INTRAVENOUS
Status: DISCONTINUED | OUTPATIENT
Start: 2018-12-11 | End: 2018-12-11

## 2018-12-11 RX ORDER — ONDANSETRON 4 MG/1
8 TABLET, ORALLY DISINTEGRATING ORAL EVERY 6 HOURS PRN
Status: DISCONTINUED | OUTPATIENT
Start: 2018-12-11 | End: 2018-12-14 | Stop reason: HOSPADM

## 2018-12-11 RX ORDER — DOCUSATE SODIUM 100 MG/1
100 CAPSULE, LIQUID FILLED ORAL DAILY
Status: DISCONTINUED | OUTPATIENT
Start: 2018-12-11 | End: 2018-12-14 | Stop reason: HOSPADM

## 2018-12-11 RX ORDER — AMOXICILLIN 250 MG
2 CAPSULE ORAL NIGHTLY PRN
Status: DISCONTINUED | OUTPATIENT
Start: 2018-12-11 | End: 2018-12-14 | Stop reason: HOSPADM

## 2018-12-11 RX ORDER — GLYCOPYRROLATE 0.2 MG/ML
INJECTION INTRAMUSCULAR; INTRAVENOUS
Status: DISCONTINUED | OUTPATIENT
Start: 2018-12-11 | End: 2018-12-11

## 2018-12-11 RX ORDER — KETAMINE HYDROCHLORIDE 100 MG/ML
INJECTION, SOLUTION INTRAMUSCULAR; INTRAVENOUS
Status: DISCONTINUED | OUTPATIENT
Start: 2018-12-11 | End: 2018-12-11

## 2018-12-11 RX ORDER — ACETAMINOPHEN 325 MG/1
650 TABLET ORAL EVERY 6 HOURS PRN
Status: DISCONTINUED | OUTPATIENT
Start: 2018-12-12 | End: 2018-12-14 | Stop reason: HOSPADM

## 2018-12-11 RX ORDER — MAG HYDROX/ALUMINUM HYD/SIMETH 200-200-20
30 SUSPENSION, ORAL (FINAL DOSE FORM) ORAL EVERY 4 HOURS PRN
Status: DISCONTINUED | OUTPATIENT
Start: 2018-12-11 | End: 2018-12-14 | Stop reason: HOSPADM

## 2018-12-11 RX ORDER — MEPERIDINE HYDROCHLORIDE 50 MG/ML
12.5 INJECTION INTRAMUSCULAR; INTRAVENOUS; SUBCUTANEOUS ONCE AS NEEDED
Status: DISCONTINUED | OUTPATIENT
Start: 2018-12-11 | End: 2018-12-11 | Stop reason: HOSPADM

## 2018-12-11 RX ORDER — SODIUM CHLORIDE 0.9 % (FLUSH) 0.9 %
3 SYRINGE (ML) INJECTION
Status: DISCONTINUED | OUTPATIENT
Start: 2018-12-11 | End: 2018-12-14 | Stop reason: HOSPADM

## 2018-12-11 RX ORDER — OXYCODONE HYDROCHLORIDE 5 MG/1
5 TABLET ORAL EVERY 4 HOURS PRN
Status: DISCONTINUED | OUTPATIENT
Start: 2018-12-11 | End: 2018-12-13

## 2018-12-11 RX ORDER — BISACODYL 10 MG
10 SUPPOSITORY, RECTAL RECTAL DAILY
Status: DISCONTINUED | OUTPATIENT
Start: 2018-12-11 | End: 2018-12-14 | Stop reason: HOSPADM

## 2018-12-11 RX ORDER — ACETAMINOPHEN 10 MG/ML
INJECTION, SOLUTION INTRAVENOUS
Status: DISCONTINUED | OUTPATIENT
Start: 2018-12-11 | End: 2018-12-11

## 2018-12-11 RX ORDER — DIPHENHYDRAMINE HCL 25 MG
50 CAPSULE ORAL EVERY 6 HOURS PRN
Status: DISCONTINUED | OUTPATIENT
Start: 2018-12-11 | End: 2018-12-14 | Stop reason: HOSPADM

## 2018-12-11 RX ORDER — CEFAZOLIN SODIUM 2 G/50ML
2 SOLUTION INTRAVENOUS
Status: COMPLETED | OUTPATIENT
Start: 2018-12-11 | End: 2018-12-12

## 2018-12-11 RX ORDER — OXYCODONE HYDROCHLORIDE 5 MG/1
15 TABLET ORAL EVERY 4 HOURS PRN
Status: DISCONTINUED | OUTPATIENT
Start: 2018-12-11 | End: 2018-12-13

## 2018-12-11 RX ORDER — NEOSTIGMINE METHYLSULFATE 1 MG/ML
INJECTION, SOLUTION INTRAVENOUS
Status: DISCONTINUED | OUTPATIENT
Start: 2018-12-11 | End: 2018-12-11

## 2018-12-11 RX ORDER — LORAZEPAM 2 MG/ML
0.25 INJECTION INTRAMUSCULAR
Status: DISCONTINUED | OUTPATIENT
Start: 2018-12-11 | End: 2018-12-11 | Stop reason: HOSPADM

## 2018-12-11 RX ORDER — SUCCINYLCHOLINE CHLORIDE 20 MG/ML
INJECTION INTRAMUSCULAR; INTRAVENOUS
Status: DISCONTINUED | OUTPATIENT
Start: 2018-12-11 | End: 2018-12-11

## 2018-12-11 RX ORDER — HYDROMORPHONE HCL IN 0.9% NACL 6 MG/30 ML
PATIENT CONTROLLED ANALGESIA SYRINGE INTRAVENOUS CONTINUOUS
Status: DISCONTINUED | OUTPATIENT
Start: 2018-12-11 | End: 2018-12-12

## 2018-12-11 RX ORDER — FENTANYL CITRATE 50 UG/ML
INJECTION, SOLUTION INTRAMUSCULAR; INTRAVENOUS
Status: DISCONTINUED | OUTPATIENT
Start: 2018-12-11 | End: 2018-12-11

## 2018-12-11 RX ORDER — SODIUM CHLORIDE, SODIUM LACTATE, POTASSIUM CHLORIDE, CALCIUM CHLORIDE 600; 310; 30; 20 MG/100ML; MG/100ML; MG/100ML; MG/100ML
INJECTION, SOLUTION INTRAVENOUS CONTINUOUS
Status: DISCONTINUED | OUTPATIENT
Start: 2018-12-11 | End: 2018-12-11

## 2018-12-11 RX ORDER — OXYCODONE HYDROCHLORIDE 5 MG/1
10 TABLET ORAL EVERY 4 HOURS PRN
Status: DISCONTINUED | OUTPATIENT
Start: 2018-12-11 | End: 2018-12-13

## 2018-12-11 RX ORDER — PHENYLEPHRINE HYDROCHLORIDE 10 MG/ML
INJECTION INTRAVENOUS
Status: DISCONTINUED | OUTPATIENT
Start: 2018-12-11 | End: 2018-12-11

## 2018-12-11 RX ORDER — ACETAMINOPHEN 10 MG/ML
1000 INJECTION, SOLUTION INTRAVENOUS EVERY 8 HOURS
Status: COMPLETED | OUTPATIENT
Start: 2018-12-11 | End: 2018-12-12

## 2018-12-11 RX ORDER — SODIUM CHLORIDE, SODIUM LACTATE, POTASSIUM CHLORIDE, CALCIUM CHLORIDE 600; 310; 30; 20 MG/100ML; MG/100ML; MG/100ML; MG/100ML
125 INJECTION, SOLUTION INTRAVENOUS CONTINUOUS
Status: DISCONTINUED | OUTPATIENT
Start: 2018-12-11 | End: 2018-12-14 | Stop reason: HOSPADM

## 2018-12-11 RX ORDER — NALOXONE HCL 0.4 MG/ML
0.02 VIAL (ML) INJECTION
Status: DISCONTINUED | OUTPATIENT
Start: 2018-12-11 | End: 2018-12-14 | Stop reason: HOSPADM

## 2018-12-11 RX ORDER — CEFAZOLIN SODIUM 2 G/50ML
2 SOLUTION INTRAVENOUS
Status: COMPLETED | OUTPATIENT
Start: 2018-12-11 | End: 2018-12-11

## 2018-12-11 RX ORDER — LIDOCAINE HCL/PF 100 MG/5ML
SYRINGE (ML) INTRAVENOUS
Status: DISCONTINUED | OUTPATIENT
Start: 2018-12-11 | End: 2018-12-11

## 2018-12-11 RX ORDER — SODIUM CHLORIDE, SODIUM LACTATE, POTASSIUM CHLORIDE, CALCIUM CHLORIDE 600; 310; 30; 20 MG/100ML; MG/100ML; MG/100ML; MG/100ML
INJECTION, SOLUTION INTRAVENOUS CONTINUOUS
Status: DISCONTINUED | OUTPATIENT
Start: 2018-12-11 | End: 2018-12-12

## 2018-12-11 RX ORDER — KETOROLAC TROMETHAMINE 30 MG/ML
INJECTION, SOLUTION INTRAMUSCULAR; INTRAVENOUS
Status: DISCONTINUED | OUTPATIENT
Start: 2018-12-11 | End: 2018-12-11

## 2018-12-11 RX ORDER — RAMELTEON 8 MG/1
8 TABLET ORAL NIGHTLY PRN
Status: DISCONTINUED | OUTPATIENT
Start: 2018-12-11 | End: 2018-12-14 | Stop reason: HOSPADM

## 2018-12-11 RX ORDER — ONDANSETRON 2 MG/ML
INJECTION INTRAMUSCULAR; INTRAVENOUS
Status: DISCONTINUED | OUTPATIENT
Start: 2018-12-11 | End: 2018-12-11

## 2018-12-11 RX ORDER — PROPOFOL 10 MG/ML
VIAL (ML) INTRAVENOUS
Status: DISCONTINUED | OUTPATIENT
Start: 2018-12-11 | End: 2018-12-11

## 2018-12-11 RX ORDER — EPHEDRINE SULFATE 50 MG/ML
INJECTION, SOLUTION INTRAVENOUS
Status: DISCONTINUED | OUTPATIENT
Start: 2018-12-11 | End: 2018-12-11

## 2018-12-11 RX ORDER — ROCURONIUM BROMIDE 10 MG/ML
INJECTION, SOLUTION INTRAVENOUS
Status: DISCONTINUED | OUTPATIENT
Start: 2018-12-11 | End: 2018-12-11

## 2018-12-11 RX ORDER — HEPARIN SODIUM 10000 [USP'U]/ML
INJECTION, SOLUTION INTRAVENOUS; SUBCUTANEOUS
Status: DISCONTINUED | OUTPATIENT
Start: 2018-12-11 | End: 2018-12-11 | Stop reason: HOSPADM

## 2018-12-11 RX ORDER — PANTOPRAZOLE SODIUM 40 MG/1
40 TABLET, DELAYED RELEASE ORAL DAILY
Status: DISCONTINUED | OUTPATIENT
Start: 2018-12-11 | End: 2018-12-14 | Stop reason: HOSPADM

## 2018-12-11 RX ADMIN — FENTANYL CITRATE 50 MCG: 50 INJECTION, SOLUTION INTRAMUSCULAR; INTRAVENOUS at 07:12

## 2018-12-11 RX ADMIN — PROPOFOL 180 MG: 10 INJECTION, EMULSION INTRAVENOUS at 07:12

## 2018-12-11 RX ADMIN — ACETAMINOPHEN 1000 MG: 10 INJECTION, SOLUTION INTRAVENOUS at 07:12

## 2018-12-11 RX ADMIN — PHENYLEPHRINE HYDROCHLORIDE 100 MCG: 10 INJECTION INTRAVENOUS at 10:12

## 2018-12-11 RX ADMIN — GLYCOPYRROLATE 0.2 MG: 0.2 INJECTION, SOLUTION INTRAMUSCULAR; INTRAVENOUS at 07:12

## 2018-12-11 RX ADMIN — MIDAZOLAM 2 MG: 1 INJECTION INTRAMUSCULAR; INTRAVENOUS at 06:12

## 2018-12-11 RX ADMIN — DEXAMETHASONE SODIUM PHOSPHATE 8 MG: 4 INJECTION, SOLUTION INTRAMUSCULAR; INTRAVENOUS at 07:12

## 2018-12-11 RX ADMIN — SODIUM CHLORIDE, SODIUM LACTATE, POTASSIUM CHLORIDE, AND CALCIUM CHLORIDE 125 ML/HR: .6; .31; .03; .02 INJECTION, SOLUTION INTRAVENOUS at 12:12

## 2018-12-11 RX ADMIN — EPHEDRINE SULFATE 5 MG: 50 INJECTION, SOLUTION INTRAMUSCULAR; INTRAVENOUS; SUBCUTANEOUS at 09:12

## 2018-12-11 RX ADMIN — NEOSTIGMINE METHYLSULFATE 4 MG: 1 INJECTION INTRAVENOUS at 10:12

## 2018-12-11 RX ADMIN — EPHEDRINE SULFATE 10 MG: 50 INJECTION, SOLUTION INTRAMUSCULAR; INTRAVENOUS; SUBCUTANEOUS at 08:12

## 2018-12-11 RX ADMIN — FENTANYL CITRATE 100 MCG: 50 INJECTION, SOLUTION INTRAMUSCULAR; INTRAVENOUS at 07:12

## 2018-12-11 RX ADMIN — CEFAZOLIN SODIUM 2 G: 2 SOLUTION INTRAVENOUS at 01:12

## 2018-12-11 RX ADMIN — THERA TABS 1 TABLET: TAB at 02:12

## 2018-12-11 RX ADMIN — PANTOPRAZOLE SODIUM 40 MG: 40 TABLET, DELAYED RELEASE ORAL at 02:12

## 2018-12-11 RX ADMIN — KETAMINE HYDROCHLORIDE 10 MG: 100 INJECTION, SOLUTION, CONCENTRATE INTRAMUSCULAR; INTRAVENOUS at 09:12

## 2018-12-11 RX ADMIN — GLYCOPYRROLATE 0.6 MG: 0.2 INJECTION, SOLUTION INTRAMUSCULAR; INTRAVENOUS at 10:12

## 2018-12-11 RX ADMIN — BISACODYL 10 MG: 10 SUPPOSITORY RECTAL at 02:12

## 2018-12-11 RX ADMIN — ALBUMIN (HUMAN): 12.5 SOLUTION INTRAVENOUS at 09:12

## 2018-12-11 RX ADMIN — EPHEDRINE SULFATE 25 MG: 50 INJECTION, SOLUTION INTRAMUSCULAR; INTRAVENOUS; SUBCUTANEOUS at 08:12

## 2018-12-11 RX ADMIN — SODIUM CHLORIDE, SODIUM LACTATE, POTASSIUM CHLORIDE, AND CALCIUM CHLORIDE: .6; .31; .03; .02 INJECTION, SOLUTION INTRAVENOUS at 11:12

## 2018-12-11 RX ADMIN — CEFAZOLIN SODIUM 2 G: 2 SOLUTION INTRAVENOUS at 07:12

## 2018-12-11 RX ADMIN — SODIUM CHLORIDE, SODIUM LACTATE, POTASSIUM CHLORIDE, AND CALCIUM CHLORIDE: .6; .31; .03; .02 INJECTION, SOLUTION INTRAVENOUS at 06:12

## 2018-12-11 RX ADMIN — ROCURONIUM BROMIDE 45 MG: 10 INJECTION, SOLUTION INTRAVENOUS at 07:12

## 2018-12-11 RX ADMIN — DOCUSATE SODIUM 100 MG: 100 CAPSULE, LIQUID FILLED ORAL at 02:12

## 2018-12-11 RX ADMIN — HYDROMORPHONE HYDROCHLORIDE 0.4 MG: 2 INJECTION INTRAMUSCULAR; INTRAVENOUS; SUBCUTANEOUS at 11:12

## 2018-12-11 RX ADMIN — HYDROMORPHONE HYDROCHLORIDE 0.2 MG: 2 INJECTION INTRAMUSCULAR; INTRAVENOUS; SUBCUTANEOUS at 11:12

## 2018-12-11 RX ADMIN — ACETAMINOPHEN 1000 MG: 10 INJECTION, SOLUTION INTRAVENOUS at 01:12

## 2018-12-11 RX ADMIN — SODIUM CHLORIDE, SODIUM LACTATE, POTASSIUM CHLORIDE, AND CALCIUM CHLORIDE: .6; .31; .03; .02 INJECTION, SOLUTION INTRAVENOUS at 08:12

## 2018-12-11 RX ADMIN — LORAZEPAM 0.25 MG: 2 INJECTION, SOLUTION INTRAMUSCULAR; INTRAVENOUS at 12:12

## 2018-12-11 RX ADMIN — KETAMINE HYDROCHLORIDE 10 MG: 100 INJECTION, SOLUTION, CONCENTRATE INTRAMUSCULAR; INTRAVENOUS at 07:12

## 2018-12-11 RX ADMIN — VECURONIUM BROMIDE FOR INJECTION 5 MG: 1 INJECTION, POWDER, LYOPHILIZED, FOR SOLUTION INTRAVENOUS at 08:12

## 2018-12-11 RX ADMIN — ONDANSETRON 4 MG: 2 INJECTION, SOLUTION INTRAMUSCULAR; INTRAVENOUS at 11:12

## 2018-12-11 RX ADMIN — HYDROMORPHONE HYDROCHLORIDE 0.4 MG: 2 INJECTION INTRAMUSCULAR; INTRAVENOUS; SUBCUTANEOUS at 09:12

## 2018-12-11 RX ADMIN — FENTANYL CITRATE 50 MCG: 50 INJECTION, SOLUTION INTRAMUSCULAR; INTRAVENOUS at 08:12

## 2018-12-11 RX ADMIN — SODIUM CHLORIDE, SODIUM LACTATE, POTASSIUM CHLORIDE, AND CALCIUM CHLORIDE 125 ML/HR: .6; .31; .03; .02 INJECTION, SOLUTION INTRAVENOUS at 09:12

## 2018-12-11 RX ADMIN — VECURONIUM BROMIDE FOR INJECTION 2 MG: 1 INJECTION, POWDER, LYOPHILIZED, FOR SOLUTION INTRAVENOUS at 09:12

## 2018-12-11 RX ADMIN — ROCURONIUM BROMIDE 5 MG: 10 INJECTION, SOLUTION INTRAVENOUS at 07:12

## 2018-12-11 RX ADMIN — Medication: at 12:12

## 2018-12-11 RX ADMIN — MUPIROCIN 1 G: 20 OINTMENT TOPICAL at 09:12

## 2018-12-11 RX ADMIN — ACETAMINOPHEN 1000 MG: 10 INJECTION, SOLUTION INTRAVENOUS at 09:12

## 2018-12-11 RX ADMIN — KETAMINE HYDROCHLORIDE 40 MG: 100 INJECTION, SOLUTION, CONCENTRATE INTRAMUSCULAR; INTRAVENOUS at 07:12

## 2018-12-11 RX ADMIN — SUCCINYLCHOLINE CHLORIDE 160 MG: 20 INJECTION, SOLUTION INTRAMUSCULAR; INTRAVENOUS at 07:12

## 2018-12-11 RX ADMIN — KETOROLAC TROMETHAMINE 30 MG: 30 INJECTION, SOLUTION INTRAMUSCULAR; INTRAVENOUS at 11:12

## 2018-12-11 RX ADMIN — LIDOCAINE HYDROCHLORIDE 100 MG: 20 INJECTION, SOLUTION INTRAVENOUS at 07:12

## 2018-12-11 NOTE — INTERVAL H&P NOTE
The patient has been examined and the H&P has been reviewed:    I concur with the findings and no changes have occurred since H&P was written.    Anesthesia/Surgery risks, benefits and alternative options discussed and understood by patient/family.          Active Hospital Problems    Diagnosis  POA    Spinal stenosis, lumbar region, with neurogenic claudication [M48.062]  Yes      Resolved Hospital Problems   No resolved problems to display.

## 2018-12-11 NOTE — OP NOTE
DATE OF PROCEDURE:  12/11/2018.    PREOPERATIVE DIAGNOSES:  1.  Lumbar spinal stenosis, multilevel.  2.  History of lumbar laminectomy L4 and L5.  3.  Grade II spondylolisthesis at L5-S1.  4.  Lumbar degenerative disk disease.  5.  Lumbar spondylosis.    FINAL DIAGNOSES:  1.  Lumbar spinal stenosis, multilevel.  2.  History of lumbar laminectomy L4 and L5.  3.  Grade II spondylolisthesis at L5-S1.  4.  Lumbar degenerative disk disease.  5.  Lumbar spondylosis.    PROCEDURES PERFORMED:  1.  Reexploration laminotomy decompression bilateral L4-L5, CPT code 79568-71.  2.  L1 laminectomy, facetectomy, foraminotomy and nerve root decompression, CPT   code 55391.  3.  L2 laminectomy, facetectomy, foraminotomy and nerve root decompression, CPT   code 24811.  4.  Laminectomy L3 with facetectomy, foraminotomy and nerve root decompression,   CPT code 14031.  5.  Posterior segmental instrumentation L1-S1 using [employer] titanium jacqui and   screw system, CPT code 32934.  6.  Posterolateral fusion L1-L2.  CPT code 66971.  7.  Posterolateral fusion L2-L3, CPT code 26087.  8.  Posterior lateral fusion, L3-L4, CPT code 92849.  9.  Posterolateral fusion L4-L5, CPT code 68463.  10.  Posterolateral fusion L5-S1, CPT code 80640.  11.  Harvesting local bone graft two same incision for spinal fusion, CPT code   32630.  12.  Computer-assisted spinal navigation for insertion of pedicle screws, CPT   code 44131.    SURGEON:  Vik Cox MD.    ASSISTANT:  JEANNIE Espinoza.    ANESTHESIA:  General.    OPERATIVE REPORT:  Mr. Larkin was brought to the Operating Room and placed on   table in supine position and intubated and catheter was placed in his bladder.    Neuromonitoring leads were placed on the patient.  He was turned prone.  Bony   prominences were padded.  His back was cleansed with alcohol and prepped with   ChloraPrep solution and draped in the usual fashion.  Due to complexity of the   surgery, a skilled surgical assistant was  necessary.  No resident physician was   available and thus Oz Grimaldo, physician's assistant surgeon at capacity   including the patient proper positioning, surgery and wound closure.    This gentleman had two previous laminectomies at L4 and L5 and never had   resolution of his leg complaints.  Diagnostic study shows severe spinal stenosis   at L2, L1 and L3 and foraminal stenosis at L4-L5.    An incision was made from L3 to the sacrum.  We elevated the soft tissues,   identified the first remaining spinous process, which was L3 and L3-L4 facet   joints and then the lateral gutters overlying the L4-L5 and L5-S1 facet joints.    We identified the sacral ala as well as the L4 transverse processes   bilaterally.  The L4-L5 transverse process on the right side was palpable and   was very deep consistent with a grade II spondylolisthesis.  We removed the soft   tissue from the L3 level and removed the spinous process.  The midline L3   laminectomy was performed.  There was marked thickening of the lamina as well as   ligamentum flavum areas causing foraminal central stenosis.  A partial   facetectomy was performed of the L3-L4 facet joints and L4 nerve root   foraminotomies were performed.  Next, we removed the spinous processes of L2 and   L1 identified the facet joints and also the transverse processes of L1 and L2   bilaterally.  Midline laminectomies were performed with foraminotomies of L1 and   L2, decompressing the L2 and L3 nerve roots.  All bone graft harvested during   decompression, later be used for the spinal fusion.  Next, we addressed the   L4-L5 level.  We noted that at the L4-L5 level, it appeared that the facet   joints were partially fused.  The remaining lamina left over from the   laminectomy was identified.  There was no motion at that level.  We attempted   repeat bilateral foraminotomies at L4-L5.  We did encounter some postoperative   epidural fibrosis, which prevented us from being able  to decompress the L5 nerve   roots as thoroughly as we would have liked.  We did not want to cause a   cerebrospinal fluid leak.  At the L5-S1 level, the facet joints appeared to be   fused.  We did not plan on decompressing the S1 nerve roots.  Thus, at this   point in the procedure, the L2, L3 and L4 nerve roots bilaterally were   completely decompressed and the L5 nerve roots were decompressed as well as   technically feasible considering the epidural fibrosis and multiple surgeries at   that level.  The Kudan reference arc for the navigation system was placed   on T11.  The Kudan O-arm navigation device was brought onto the field.    Because of the length of the planned fusion, we had to do two separate CT scans,   one going from L1 down to L4, the other from L3 to the sacrum in order to   visualize all anatomy.  We started proximally work and distally.  We inserted   pedicle screws using computer-assisted navigation and normal anatomic landmarks   and the pedicles on the left side at L1, L2, L3, L4 and S1.  We did not attempt   to put a pedicle screw into the L5 level on the left since the anatomy was so   distorted.  On the right side, we inserted pedicle screws into the pedicles of   L1, L2, L3, L4 and L5 in the sacrum using [employer] titanium Solera screws with   excellent bony purchase.  We decorticated the transverse processes from L1 to   the sacrum as well as the sacral ala areas and then placed a bone graft in the   lateral gutters connecting the sacral ala to the L1 transverse processes.  A   cobalt chrome jacqui was then measuring 5.5 mm in diameter was brought onto the   field after measuring the length and then cutting the jacqui.  We then could   perform the jacqui to the alignment of the pedicle screws on both sides.  The jacqui   was approximately 160 mm in length and fit well after contouring the rods.  Set   screws were then inserted and tightened to proper torque.  The alignment looked   excellent  on the C-arm.  We then placed multiple small piece of Gelfoam over the   exposed dura and nerve root in the midline.  The lumbodorsal fascia was closed   with #1 Vicryl suture.  Drain was brought through a separate stab incision.    Subcutaneous tissues were closed with 2-0 Vicryl and skin with stainless steel   staples followed by sterile dressing.  Sponge and needle counts were correct.    Estimated blood loss for the surgery was 800 mL with blood returned via Cell   Saver over 400 mL.      CHAITANYA/RENEE  dd: 12/11/2018 11:41:41 (CST)  td: 12/11/2018 15:04:56 (CST)  Doc ID   #[id number]  Job ID #[id number]    CC:

## 2018-12-11 NOTE — PLAN OF CARE
Problem: Adult Inpatient Plan of Care  Goal: Plan of Care Review  Outcome: Ongoing (interventions implemented as appropriate)  Pt pain is controlled with PCA pump, harris to gravity, dressing to back clean dry and intact, absent from injury, VSS and noted, IV fluids infusing, IV abx scheduled Kym drain with minimal output nad noted will cont to monitor

## 2018-12-11 NOTE — BRIEF OP NOTE
[facility]  Brief Operative Note    SUMMARY     Surgery Date: 12/11/2018     Surgeon(s) and Role:     * Vik Cox MD - Primary    Assisting Surgeon: tatiana Grimaldo    Pre-op Diagnosis:  Spinal stenosis of lumbar region with neurogenic claudication [M48.062]  Spondylolisthesis, lumbosacral region [M43.17]  DDD (degenerative disc disease), lumbar [M51.36]    Post-op Diagnosis:  Post-Op Diagnosis Codes:     * Spinal stenosis of lumbar region with neurogenic claudication [M48.062]     * Spondylolisthesis, lumbosacral region [M43.17]     * DDD (degenerative disc disease), lumbar [M51.36]  Post laminectomy syndrome    Procedure(s) (LRB):  LAMINECTOMY, SPINE, LUMBAR L1, L2, L3 with  L4-L5 Re-exploration (N/A)  FUSION, SPINE, LUMBAR, PLIF L1-2, L2-3, L3-4, L4-5, L5-S1 (N/A)    Anesthesia: General    Description of Procedure: dictated    Description of the findings of the procedure: dictated    Estimated Blood Loss: 1050 mL         Specimens:   Specimen (12h ago, onward)    None

## 2018-12-11 NOTE — TRANSFER OF CARE
"Anesthesia Transfer of Care Note    Patient: Mal Larkin    Procedure(s) Performed: Procedure(s) (LRB):  LAMINECTOMY, SPINE, LUMBAR L1, L2, L3 with  L4-L5 Re-exploration (N/A)  FUSION, SPINE, LUMBAR, PLIF L1-2, L2-3, L3-4, L4-5, L5-S1 (N/A)    Patient location: PACU    Anesthesia Type: general    Transport from OR: Transported from OR on 2-3 L/min O2 by NC with adequate spontaneous ventilation    Post pain: adequate analgesia    Post assessment: no apparent anesthetic complications    Level of consciousness: awake    Nausea/Vomiting: no nausea/vomiting    Complications: none    Transfer of care protocol was followed      Last vitals:   Visit Vitals  /75 (BP Location: Left arm, Patient Position: Lying)   Pulse (!) 58   Temp 36.9 °C (98.4 °F) (Temporal)   Resp 16   Ht 5' 7.5" (1.715 m)   Wt 83 kg (182 lb 15.7 oz)   SpO2 100%   BMI 28.24 kg/m²     "

## 2018-12-11 NOTE — ANESTHESIA POSTPROCEDURE EVALUATION
"Anesthesia Post Evaluation    Patient: Mal Larkin    Procedure(s) Performed: Procedure(s) (LRB):  LAMINECTOMY, SPINE, LUMBAR L1, L2, L3 with  L4-L5 Re-exploration (N/A)  FUSION, SPINE, LUMBAR, PLIF L1-2, L2-3, L3-4, L4-5, L5-S1 (N/A)    Final Anesthesia Type: general  Patient location during evaluation: PACU  Patient participation: Yes- Able to Participate  Level of consciousness: awake and alert  Post-procedure vital signs: reviewed and stable  Pain management: adequate  Airway patency: patent  PONV status at discharge: No PONV  Anesthetic complications: no      Cardiovascular status: blood pressure returned to baseline and hemodynamically stable  Respiratory status: unassisted  Hydration status: euvolemic  Follow-up not needed.        Visit Vitals  /60   Pulse 70   Temp 36.8 °C (98.2 °F) (Temporal)   Resp 11   Ht 5' 7.5" (1.715 m)   Wt 83 kg (182 lb 15.7 oz)   SpO2 98%   BMI 28.24 kg/m²       Pain/Taylor Score: Pain Rating Prior to Med Admin: 10 (12/11/2018 12:07 PM)        "

## 2018-12-11 NOTE — ANESTHESIA PREPROCEDURE EVALUATION
12/11/2018  Mal Larkin is a 63 y.o., male.    Anesthesia Evaluation         Review of Systems      Physical Exam  General:  Well nourished    Airway/Jaw/Neck:  Airway Findings: Mallampati: II                Anesthesia Plan  Type of Anesthesia, risks & benefits discussed:  Anesthesia Type:  general  Patient's Preference:   Intra-op Monitoring Plan:   Intra-op Monitoring Plan Comments:   Post Op Pain Control Plan:   Post Op Pain Control Plan Comments:   Induction:   IV  Beta Blocker:  Patient is not currently on a Beta-Blocker (No further documentation required).       Informed Consent: Patient understands risks and agrees with Anesthesia plan.  Questions answered. Anesthesia consent signed with patient.  ASA Score: 2     Day of Surgery Review of History & Physical:    H&P update referred to the surgeon.         Ready For Surgery From Anesthesia Perspective.

## 2018-12-11 NOTE — PLAN OF CARE
Pt. AAOx4, calm and cooperative.  Breathing unlabored on room air.  Pt. States his friend, Dr. Arron Cantrell, is authorized to receive medical information after the surgery from Dr. Cox.    Questions answered, comfort assured, plan of care reviewed with pt. Who voiced understanding.

## 2018-12-11 NOTE — PLAN OF CARE
1310:  Patient transferred to room 407.  Report to ALLA Kelsey.  Released from Anesthesia.  NAD noted.  Remains drowsy.  Dressing CDI.  Neuro remains intact.  DP remains 1+.  Yosef drain with minimal drainage.  PCA reviewed with RN and signed-off in computer.  No family in waiting room.  Texted to update.

## 2018-12-12 LAB
ANION GAP SERPL CALC-SCNC: 4 MMOL/L
BASOPHILS # BLD AUTO: 0 K/UL
BASOPHILS NFR BLD: 0.1 %
BUN SERPL-MCNC: 11 MG/DL
CALCIUM SERPL-MCNC: 8.1 MG/DL
CHLORIDE SERPL-SCNC: 102 MMOL/L
CO2 SERPL-SCNC: 29 MMOL/L
CREAT SERPL-MCNC: 0.7 MG/DL
DIFFERENTIAL METHOD: ABNORMAL
EOSINOPHIL # BLD AUTO: 0 K/UL
EOSINOPHIL NFR BLD: 0.1 %
ERYTHROCYTE [DISTWIDTH] IN BLOOD BY AUTOMATED COUNT: 14.1 %
EST. GFR  (AFRICAN AMERICAN): >60 ML/MIN/1.73 M^2
EST. GFR  (NON AFRICAN AMERICAN): >60 ML/MIN/1.73 M^2
GLUCOSE SERPL-MCNC: 113 MG/DL
HCT VFR BLD AUTO: 29.1 %
HCT VFR BLD AUTO: 29.2 %
HGB BLD-MCNC: 9.9 G/DL
HGB BLD-MCNC: 9.9 G/DL
LYMPHOCYTES # BLD AUTO: 0.8 K/UL
LYMPHOCYTES NFR BLD: 8.9 %
MCH RBC QN AUTO: 32.3 PG
MCHC RBC AUTO-ENTMCNC: 33.9 G/DL
MCV RBC AUTO: 95 FL
MONOCYTES # BLD AUTO: 0.8 K/UL
MONOCYTES NFR BLD: 8.4 %
NEUTROPHILS # BLD AUTO: 7.6 K/UL
NEUTROPHILS NFR BLD: 82.5 %
PLATELET # BLD AUTO: 151 K/UL
PMV BLD AUTO: 7.8 FL
POTASSIUM SERPL-SCNC: 4.3 MMOL/L
RBC # BLD AUTO: 3.06 M/UL
SODIUM SERPL-SCNC: 135 MMOL/L
WBC # BLD AUTO: 9.2 K/UL

## 2018-12-12 PROCEDURE — 99900035 HC TECH TIME PER 15 MIN (STAT)

## 2018-12-12 PROCEDURE — 25000003 PHARM REV CODE 250: Performed by: INTERNAL MEDICINE

## 2018-12-12 PROCEDURE — 80048 BASIC METABOLIC PNL TOTAL CA: CPT

## 2018-12-12 PROCEDURE — 85018 HEMOGLOBIN: CPT

## 2018-12-12 PROCEDURE — 36415 COLL VENOUS BLD VENIPUNCTURE: CPT

## 2018-12-12 PROCEDURE — 85025 COMPLETE CBC W/AUTO DIFF WBC: CPT

## 2018-12-12 PROCEDURE — 12000002 HC ACUTE/MED SURGE SEMI-PRIVATE ROOM

## 2018-12-12 PROCEDURE — 94761 N-INVAS EAR/PLS OXIMETRY MLT: CPT

## 2018-12-12 PROCEDURE — 97116 GAIT TRAINING THERAPY: CPT

## 2018-12-12 PROCEDURE — 25000003 PHARM REV CODE 250: Performed by: PHYSICIAN ASSISTANT

## 2018-12-12 PROCEDURE — 97161 PT EVAL LOW COMPLEX 20 MIN: CPT

## 2018-12-12 PROCEDURE — 63600175 PHARM REV CODE 636 W HCPCS: Performed by: PHYSICIAN ASSISTANT

## 2018-12-12 PROCEDURE — 97803 MED NUTRITION INDIV SUBSEQ: CPT

## 2018-12-12 PROCEDURE — G8979 MOBILITY GOAL STATUS: HCPCS | Mod: CJ

## 2018-12-12 PROCEDURE — G8978 MOBILITY CURRENT STATUS: HCPCS | Mod: CK

## 2018-12-12 PROCEDURE — 85014 HEMATOCRIT: CPT

## 2018-12-12 PROCEDURE — 97530 THERAPEUTIC ACTIVITIES: CPT

## 2018-12-12 PROCEDURE — 99232 SBSQ HOSP IP/OBS MODERATE 35: CPT | Mod: ,,, | Performed by: INTERNAL MEDICINE

## 2018-12-12 RX ADMIN — ACETAMINOPHEN 1000 MG: 10 INJECTION, SOLUTION INTRAVENOUS at 05:12

## 2018-12-12 RX ADMIN — HYDROMORPHONE HYDROCHLORIDE 2 MG: 2 INJECTION, SOLUTION INTRAMUSCULAR; INTRAVENOUS; SUBCUTANEOUS at 06:12

## 2018-12-12 RX ADMIN — OXYCODONE HYDROCHLORIDE 15 MG: 5 TABLET ORAL at 08:12

## 2018-12-12 RX ADMIN — PANTOPRAZOLE SODIUM 40 MG: 40 TABLET, DELAYED RELEASE ORAL at 10:12

## 2018-12-12 RX ADMIN — HYDROMORPHONE HYDROCHLORIDE 2 MG: 2 INJECTION, SOLUTION INTRAMUSCULAR; INTRAVENOUS; SUBCUTANEOUS at 05:12

## 2018-12-12 RX ADMIN — SODIUM CHLORIDE, SODIUM LACTATE, POTASSIUM CHLORIDE, AND CALCIUM CHLORIDE 125 ML/HR: .6; .31; .03; .02 INJECTION, SOLUTION INTRAVENOUS at 06:12

## 2018-12-12 RX ADMIN — BISACODYL 10 MG: 10 SUPPOSITORY RECTAL at 10:12

## 2018-12-12 RX ADMIN — MUPIROCIN 1 G: 20 OINTMENT TOPICAL at 09:12

## 2018-12-12 RX ADMIN — OXYCODONE HYDROCHLORIDE 15 MG: 5 TABLET ORAL at 10:12

## 2018-12-12 RX ADMIN — OXYCODONE HYDROCHLORIDE 5 MG: 5 TABLET ORAL at 04:12

## 2018-12-12 RX ADMIN — MUPIROCIN 1 G: 20 OINTMENT TOPICAL at 08:12

## 2018-12-12 RX ADMIN — CEFAZOLIN SODIUM 2 G: 2 SOLUTION INTRAVENOUS at 01:12

## 2018-12-12 RX ADMIN — DOCUSATE SODIUM 100 MG: 100 CAPSULE, LIQUID FILLED ORAL at 10:12

## 2018-12-12 RX ADMIN — OXYCODONE HYDROCHLORIDE 15 MG: 5 TABLET ORAL at 02:12

## 2018-12-12 RX ADMIN — THERA TABS 1 TABLET: TAB at 10:12

## 2018-12-12 RX ADMIN — HYDROMORPHONE HYDROCHLORIDE 2 MG: 2 INJECTION, SOLUTION INTRAMUSCULAR; INTRAVENOUS; SUBCUTANEOUS at 11:12

## 2018-12-12 NOTE — PT/OT/SLP EVAL
Physical Therapy Evaluation    Patient Name:  Mal Larkin   MRN:  [id number]    Recommendations:     Discharge Recommendations:  (HHPT)   Discharge Equipment Recommendations: walker, rolling   Barriers to discharge: None    Assessment:     Mal Larkin is a 63 y.o. male admitted with a medical diagnosis of Spinal stenosis, lumbar region, with neurogenic claudication.  He presents with the following impairments/functional limitations:  weakness, impaired functional mobilty, pain, orthopedic precautions . Yosef recently pulled by nurse Corbin prior to gait with noted drainage noted post gait. Nurse Corbin reinforced dressing. Pt did well mobilizing at hallways. Education for log rolling enforced. TLSO in room but requesting defer till more comfortable.     Rehab Prognosis: Good; patient would benefit from acute skilled PT services to address these deficits and reach maximum level of function.    Recent Surgery: Procedure(s) (LRB):  LAMINECTOMY, SPINE, LUMBAR L1, L2, L3 with  L4-L5 Re-exploration (N/A)  FUSION, SPINE, LUMBAR, PLIF L1-2, L2-3, L3-4, L4-5, L5-S1 (N/A) 1 Day Post-Op    Plan:     During this hospitalization, patient to be seen BID(S1S1) to address the identified rehab impairments via therapeutic activities, gait training, therapeutic exercises and progress toward the following goals:    GOALS:   Multidisciplinary Problems     Physical Therapy Goals        Problem: Physical Therapy Goal    Goal Priority Disciplines Outcome Goal Variances Interventions   Physical Therapy Goal     PT, PT/OT Ongoing (interventions implemented as appropriate)     Description:  Goals to be met by: 2018     Patient will increase functional independence with mobility by performin. Supine to sit with Contact Guard Assistance  2. Sit to stand transfer with Contact Guard Assistance  3. Bed to chair transfer with Contact Guard Assistance using Rolling Walker  4. Gait  x 250x2 feet with Contact Guard Assistance using  Rolling Walker.   5. Lower extremity exercise program x20 reps per handout, with assistance as needed                      · Plan of Care Expires:  12/28/18    Subjective   Pt stated had G sleeve 7 months ago and lost over 100 lbs. Pt stated goes to gym regularly  Pt stated had 2 previous back surgery but not aware of log rolling technique  Pt educated  On body mechanics- pt asking when he could return playing golf- advised to ask surgeon  Chief Complaint: was up in chair earlier and was uncomfortable  Patient/Family Comments/goals: get back to golf  Pain/Comfort:  · Pain Rating 1: 5/10  · Location 1: back  · Pain Addressed 1: Pre-medicate for activity, Reposition, Distraction    Patients cultural, spiritual, Congregation conflicts given the current situation:      Living Environment:  Home with spouse with 16 steps to 2nd floor bedroom- stated should be no problem  Prior to admission, patients level of function was independent.  Equipment used at home: cane, straight.  DME owned (not currently used): .  Upon discharge, patient will have assistance from spouse.    Objective:     Communicated with nurse Corbin prior to session.  Patient found all lines intact, call button in reach and nurse notified SCD, harris catheter  upon PT entry to room.    General Precautions: Standard, fall   Orthopedic Precautions:spinal precautions   Braces: TLSO(pt requesting not to use TLSO at this time.  noted orders as not mandatory for gait )     Exams:  · Postural Exam:  Patient presented with the following abnormalities:    · -       Rounded shoulders  · -       Forward head  · RLE ROM: WFL  · RLE Strength: WFL  · LLE ROM: WFL  · LLE Strength: WFL    Functional Mobility:  · Bed Mobility:     · Rolling Left:  contact guard assistance  · Scooting: contact guard assistance  · Supine to Sit: contact guard assistance  · Sit to Supine: minimum assistance  · Transfers:     · Sit to Stand:  contact guard assistance with rolling walker  · Gait:  250ft with RW- good pace gait patterns      Therapeutic Activities and Exercises:   EOB sitting for dressing reinforcement by nurse Corbin  Returned supine with min assist  Pt completed GS and explained rationale    AM-PAC 6 CLICK MOBILITY  Total Score:16     Patient left HOB elevated with all lines intact, call button in reach and nurse Emerald present.    GOALS:   Multidisciplinary Problems     Physical Therapy Goals        Problem: Physical Therapy Goal    Goal Priority Disciplines Outcome Goal Variances Interventions   Physical Therapy Goal     PT, PT/OT Ongoing (interventions implemented as appropriate)     Description:  Goals to be met by: 2018     Patient will increase functional independence with mobility by performin. Supine to sit with Contact Guard Assistance  2. Sit to stand transfer with Contact Guard Assistance  3. Bed to chair transfer with Contact Guard Assistance using Rolling Walker  4. Gait  x 250x2 feet with Contact Guard Assistance using Rolling Walker.   5. Lower extremity exercise program x20 reps per handout, with assistance as needed                      History:     Past Medical History:   Diagnosis Date    Adenomatous colon polyp     + dysplasia    Colon polyp     Fatty liver     Hypertension     Melanoma 2011    in situ - upper back       Past Surgical History:   Procedure Laterality Date    APPENDECTOMY      BACK SURGERY      transverse process fracture    BLOCK-NERVE-MEDIAL BRANCH-LUMBAR Bilateral 8/3/2017    [facility] OR    COLONOSCOPY  2013    Dr. Hayes, 3 year recheck    COLONOSCOPY N/A 2016    Procedure: COLONOSCOPY;  Surgeon: Atilio Hayes MD;  Location: [facility];  Service: Endoscopy;  Laterality: N/A;    COLONOSCOPY N/A 2016    [facility]    COLONOSCOPY N/A 2013    [facility] at [facility]    COLONOSCOPY N/A 2013    Performed by Atilio OSWALD  MD Abigail at [facility] ENDO    GASTRIC SLEEVE      HAND SURGERY      right dupuytrens release on 8/5/13    LUMBAR LAMINECTOMY  2011    melanoma removal  2011    RADIOFREQUENCY THERMOCOAGULATION ([facility]C)-NERVE-MEDIAN BRANCH-LUMBAR Bilateral 8/22/2017    Performed by [facility]o [facility] at [facility] OR    RELEASE DUPUYTREN'S CONTRACTURE Right 8/5/2013    Performed by Jamel Chinchilla MD at [facility] OR    WISDOM TOOTH EXTRACTION         Clinical Decision Making:     History  Co-morbidities and personal factors that may impact the plan of care Examination  Body Structures and Functions, activity limitations and participation restrictions that may impact the plan of care Clinical Presentation   Decision Making/ Complexity Score   Co-morbidities:   [] Time since onset of injury / illness / exacerbation  [] Status of current condition  []Patient's cognitive status and safety concerns    [] Multiple Medical Problems (see med hx)  Personal Factors:   [] Patient's age  [] Prior Level of function   [] Patient's home situation (environment and family support)  [] Patient's level of motivation  [] Expected progression of patient      HISTORY:(criteria)    [] 33190 - no personal factors/history    [] 71360 - has 1-2 personal factor/comorbidity     [] 36625 - has >3 personal factor/comorbidity     Body Regions:  [] Objective examination findings  [] Head     []  Neck  [] Trunk   [] Upper Extremity  [] Lower Extremity    Body Systems:  [] For communication ability, affect, cognition, language, and learning style: the assessment of the ability to make needs known, consciousness, orientation (person, place, and time), expected emotional /behavioral responses, and learning preferences (eg, learning barriers, education  needs)  [] For the neuromuscular system: a general assessment of gross coordinated movement (eg, balance, gait, locomotion, transfers, and transitions) and motor function  (motor control and motor learning)  [] For the  musculoskeletal system: the assessment of gross symmetry, gross range of motion, gross strength, height, and weight  [] For the integumentary system: the assessment of pliability(texture), presence of scar formation, skin color, and skin integrity  [] For cardiovascular/pulmonary system: the assessment of heart rate, respiratory rate, blood pressure, and edema     Activity limitations:    [] Patient's cognitive status and saf ety concerns          [] Status of current condition      [] Weight bearing restriction  [] Cardiopulmunary Restriction    Participation Restrictions:   [] Goals and goal agreement with the patient     [] Rehab potential (prognosis) and probable outcome      Examination of Body System: (criteria)    [] 31977 - addressing 1-2 elements    [] 20238 - addressing a total of 3 or more elements     [] 39570 -  Addressing a total of 4 or more elements         Clinical Presentation: (criteria)  Choose one     On examination of body system using standardized tests and measures patient presents with (CHOOSE ONE) elements from any of the following: body structures and functions, activity limitations, and/or participation restrictions.  Leading to a clinical presentation that is considered (CHOOSE ONE)                              Clinical Decision Making  (Eval Complexity):  Choose One     Time Tracking:     PT Received On: 12/12/18  PT Start Time: 1124     PT Stop Time: 1145  PT Total Time (min): 21 min     Billable Minutes: Evaluation 8 and Gait Training 13      Mary Kay Sullivan, PT  12/12/2018

## 2018-12-12 NOTE — PLAN OF CARE
12/12/18 1025   Discharge Assessment   Assessment Type Discharge Planning Assessment   Confirmed/corrected address and phone number on facesheet? Yes   Assessment information obtained from? Patient   Communicated expected length of stay with patient/caregiver yes   Prior to hospitilization cognitive status: Alert/Oriented   Prior to hospitalization functional status: Independent   Current cognitive status: Alert/Oriented   Current Functional Status: Independent   Lives With spouse   Able to Return to Prior Arrangements yes   Is patient able to care for self after discharge? Yes   Patient's perception of discharge disposition admitted as an inpatient   Readmission Within the Last 30 Days no previous admission in last 30 days   Patient currently being followed by outpatient case management? No   Patient currently receives any other outside agency services? No   Equipment Currently Used at Home other (see comments);cane, straight  (TLSO)   Do you have any problems affording any of your prescribed medications? No  (Ph[facility]: [facility] )   Is the patient taking medications as prescribed? yes   Does the patient have transportation home? Yes   Transportation Anticipated family or friend will provide   Does the patient receive services at the [facility]? No   Discharge Plan A Home   Patient/Family in Agreement with Plan yes

## 2018-12-12 NOTE — PROGRESS NOTES
PCA pump discontinued early per pt request. Patient educated on pain management plan of PRN medications. Denies pain at this time. Will continue to monitor.

## 2018-12-12 NOTE — PLAN OF CARE
Problem: Adult Inpatient Plan of Care  Goal: Plan of Care Review  Outcome: Ongoing (interventions implemented as appropriate)  AAOx4. Plan of care reviewed with patient. Safety maintained throughout shift. Bed in low and locked position, side rails up x2, call light within reach. IV fluids infusing at KVO. IV antibiotics infused per orders. Dressing to back intact, drainage marked, dressing reinforced. Neurovascular checks q4. DP 1+ bilateral, BLE cold, pt states numbness is present down left side of leg (states this is his normal). EMMETT drain in place. SCDs in use. PCA pump discontinued early per patient request, PRN medication given for c/o pain. Danielson catheter draining to gravity. Hourly/q2 rounding utilized for pt safety. Will continue to monitor.

## 2018-12-12 NOTE — PROGRESS NOTES
Food & Nutrition                                                           Education    Diet Education: Bariatric Diet   Time Spent: 15 Minutes  Learners: Patient      Nutrition Education provided with handouts: No, pt declined      Comments: Per nursing pt has been asking for only protein foods, refusing bariatric diet, all forms of starches, and sugar restriction. He states he is 7 months out from bariatric surgery and eats mainly meats, eggs, fruits, and vegetables in the form of 4-5 small meals at home. However, per nursing pt also requesting double meat portions and wolfing down his breakfast today that they had to sit him up because he was almost choking.     Reviewed the importance of small portion sizes and getting foods from all food groups with the patient. Suggested pt order fruits and vegetables with his meals and stick to low sugar foods as well as save items from trays at snack. Discussed eating slowly.  He states that he already knows all of this information and is mostly compliant (however sometimes he eats full sugar candy and like today eats too fast/too large of a portion).     Rec. continue diet as ordered as these are the patient's preferences and as he is 7 months out from surgery he does not need a bariatric liquid diet at this time.      All questions and concerns answered. Dietitian's contact information provided.       Follow-Up: Yes    Please Re-consult as needed        Thanks!

## 2018-12-12 NOTE — PT/OT/SLP PROGRESS
"Physical Therapy Treatment    Patient Name:  Mal Larkin   MRN:  [id number]    Recommendations:     Discharge Recommendations:  (HHPT)   Discharge Equipment Recommendations: walker, rolling   Barriers to discharge: None    Assessment:     Mal Larkin is a 63 y.o. male admitted with a medical diagnosis of Spinal stenosis, lumbar region, with neurogenic claudication.  He presents with the following impairments/functional limitations:  weakness, impaired functional mobilty, pain, orthopedic precautions . Pt seen BID today. Pt continued to have bleeding from distal lumbar incision. Assisted nurse Ann for dressing change.    Rehab Prognosis:  fair; patient would benefit from acute skilled PT services to address these deficits and reach maximum level of function.      Recent Surgery: Procedure(s) (LRB):  LAMINECTOMY, SPINE, LUMBAR L1, L2, L3 with  L4-L5 Re-exploration (N/A)  FUSION, SPINE, LUMBAR, PLIF L1-2, L2-3, L3-4, L4-5, L5-S1 (N/A) 1 Day Post-Op    Plan:     During this hospitalization, patient to be seen BID(S1S1) to address the above listed problems via therapeutic activities, gait training, therapeutic exercises  · Plan of Care Expires:  12/28/18   Plan of Care Reviewed with: patient    Subjective     Communicated with nurse Corbin prior to session.  Patient found supine upon PT entry to room, agreeable to treatment.    Pt stated of being tired, no sleep last night  Pt stated " I hope the bleeding stops" and will not require more stitches    Chief Complaint: tired  Patient comments/goals: get well  Pain/Comfort:  · Pain Rating 1: 5/10  · Location 1: back  · Pain Addressed 1: Pre-medicate for activity, Reposition, Distraction    Patients cultural, spiritual, Hinduism conflicts given the current situation:      Objective:     Patient found with: SCD, harris catheter     General Precautions: Standard, fall   Orthopedic Precautions:spinal precautions   Braces: TLSO(pt requesting not to use TLSO at this " time.  noted orders as not mandatory for gait )     Functional Mobility:  · Bed Mobility:     · Rolling Left:  minimum assistance  · Supine to Sit: minimum assistance  · Sit to Supine: minimum assistance  · Transfers:     · Sit to Stand:  minimum assistance with rolling walker  · Gait: 150ft this pm due to bleeding from incision and of fatigue      AM-PAC 6 CLICK MOBILITY  Turning over in bed (including adjusting bedclothes, sheets and blankets)?: 3  Sitting down on and standing up from a chair with arms (e.g., wheelchair, bedside commode, etc.): 3  Moving from lying on back to sitting on the side of the bed?: 3  Moving to and from a bed to a chair (including a wheelchair)?: 3  Need to walk in hospital room?: 3  Climbing 3-5 steps with a railing?: 1  Basic Mobility Total Score: 16       Therapeutic Activities and Exercises:   pt appears more tired this pm  Pt returned to bed post PT  Dressing change assistance offered to nurse Ann  Pt repositioned in bed with SCD reapplied  Patient left HOB elevated with all lines intact and call button in reach..    GOALS:   Multidisciplinary Problems     Physical Therapy Goals        Problem: Physical Therapy Goal    Goal Priority Disciplines Outcome Goal Variances Interventions   Physical Therapy Goal     PT, PT/OT Ongoing (interventions implemented as appropriate)     Description:  Goals to be met by: 2018     Patient will increase functional independence with mobility by performin. Supine to sit with Contact Guard Assistance  2. Sit to stand transfer with Contact Guard Assistance  3. Bed to chair transfer with Contact Guard Assistance using Rolling Walker  4. Gait  x 250x2 feet with Contact Guard Assistance using Rolling Walker.   5. Lower extremity exercise program x20 reps per handout, with assistance as needed                      Time Tracking:     PT Received On: 18  PT Start Time: 1600     PT Stop Time: 1621  PT Total Time (min): 21 min     Billable  Minutes: Gait Training 11 and Therapeutic Activity 10       PT/PTA: PT           Mary Kay Sullivan, PT  12/12/2018

## 2018-12-12 NOTE — PLAN OF CARE
Problem: Physical Therapy Goal  Goal: Physical Therapy Goal  Goals to be met by: 2018     Patient will increase functional independence with mobility by performin. Supine to sit with Contact Guard Assistance  2. Sit to stand transfer with Contact Guard Assistance  3. Bed to chair transfer with Contact Guard Assistance using Rolling Walker  4. Gait  x 250x2 feet with Contact Guard Assistance using Rolling Walker.   5. Lower extremity exercise program x20 reps per handout, with assistance as needed    Outcome: Ongoing (interventions implemented as appropriate)  PT eval and treat completed. Gait 250ft with RW min assist with noticeable bleeding from incision. Nurse Emerald at bedside. [facility] drain recently pulled prior to PT

## 2018-12-12 NOTE — PROGRESS NOTES
Progress Note  Hospital Medicine  Patient Name:Mal Larkin  MRN:  [id number]  Patient Class: IP- Inpatient  Admit Date: 12/11/2018  Length of Stay: 1 days  Expected Discharge Date:   Attending Physician: Betty Reis MD  Primary Care Provider:  Tito Marino MD    SUBJECTIVE:     Principal Problem: Spinal stenosis, lumbar region, with neurogenic claudication  Initial history of present illness: Patient is a 63 y.o. male admitted to Hospitalist Service from [facility] s/p Re-exploration of L1, L2, L3 with L4-5 lumbar laminectomy performed by Dr. Cox. Patient reportedly has past medical history significant for hypertension, history of melanoma and colon polyps. Post-operatively, patient denied chest pain, shortness of breath, abdominal pain, nausea, vomiting, headache, vision changes, focal neuro-deficits, cough or fever.    PMH/PSH/SH/FH/Meds: reviewed.    Symptoms/Review of Systems: Looking and feeling well. Pain controlled. Working with PT.  No shortness of breath, cough, chest pain or headache, fever or abdominal pain.     Diet:  Adequate intake.    Activity level: Normal.    Pain:  Patient reports no pain.       OBJECTIVE:   Vital Signs (Most Recent):      Temp: 98.4 °F (36.9 °C) (12/12/18 0903)  Pulse: 75 (12/12/18 0903)  Resp: 20 (12/12/18 0903)  BP: 125/63 (12/12/18 0903)  SpO2: 98 % (12/12/18 0903)       Vital Signs Range (Last 24H):  Temp:  [98 °F (36.7 °C)-98.9 °F (37.2 °C)]   Pulse:  []   Resp:  [6-20]   BP: ()/(50-69)   SpO2:  [94 %-99 %]     Weight: 83 kg (182 lb 15.7 oz)  Body mass index is 28.66 kg/m².    Intake/Output Summary (Last 24 hours) at 12/12/2018 1040  Last data filed at 12/12/2018 0600  Gross per 24 hour   Intake 1378.25 ml   Output 1000 ml   Net 378.25 ml     Physical Examination:  General appearance: well developed, appears stated age  Head: normocephalic, atraumatic  Eyes:  conjunctivae/corneas clear. PERRL.  Nose: Nares normal. Septum midline.  Throat: lips,  mucosa, and tongue normal; teeth and gums normal, no throat erythema.  Neck: supple, symmetrical, trachea midline, no JVD and thyroid not enlarged, symmetric, no tenderness/mass/nodules  Lungs:  clear to auscultation bilaterally and normal respiratory effort  Chest wall: no tenderness  Heart: regular rate and rhythm, S1, S2 normal, no murmur, click, rub or gallop  Abdomen: soft, non-tender non-distented; bowel sounds normal; no masses,  no organomegaly  Extremities: no cyanosis, clubbing or edema.   Pulses: 2+ and symmetric  Skin: Skin color, texture, turgor normal. No rashes or lesions.  Lymph nodes: Cervical, supraclavicular, and axillary nodes normal.  Neurologic: Sedated but grossly non-focal, moving extremities.     CBC:  Recent Labs   Lab 12/11/18  1340 12/12/18  0622   WBC 13.80* 9.20   RBC 3.73* 3.06*   HGB 11.9* 9.9*   HCT 35.6* 29.1*    151   MCV 96 95   MCH 32.0* 32.3*   MCHC 33.5 33.9   BMP  Recent Labs   Lab 12/11/18  1340 12/12/18  0622   * 113*    135*   K 4.4 4.3    102   CO2 24 29   BUN 11 11   CREATININE 0.7 0.7   CALCIUM 8.4* 8.1*      Diagnostic Results:  Microbiology Results (last 7 days)     ** No results found for the last 168 hours. **         Assessment/Plan:      Spinal stenosis, lumbar region, with neurogenic claudication [M48.062] s/p Re-exploration of L1, L2, L3 with L4-5 lumbar laminectomy  Continue to follow Orthopedic recommendations.  Needs aggressive incentive spirometry.  Follow hemoglobin and hematocrit closely.  Pain control with PO narcotics and antiemetics as needed.  Physical therapy as per Orthopedics protocol with fall precautions.   Yes              Hypertension [I10]  Continue chronic medications and monitor for any changes, adjusting as needed.    Acute surgical blood loss anemia  Follow CBC and transfuse as needed.      Yes       DVT prophylaxis: Use SCD and TEDs. No anticoagulation as per Dr. Cox.    Betty Reis MD  Department of [facility]  Medicine   [facility]

## 2018-12-13 LAB
ANION GAP SERPL CALC-SCNC: 5 MMOL/L
BASOPHILS # BLD AUTO: 0 K/UL
BASOPHILS NFR BLD: 0.1 %
BUN SERPL-MCNC: 9 MG/DL
CALCIUM SERPL-MCNC: 8.4 MG/DL
CHLORIDE SERPL-SCNC: 101 MMOL/L
CO2 SERPL-SCNC: 29 MMOL/L
CREAT SERPL-MCNC: 0.7 MG/DL
DIFFERENTIAL METHOD: ABNORMAL
EOSINOPHIL # BLD AUTO: 0 K/UL
EOSINOPHIL NFR BLD: 0.1 %
ERYTHROCYTE [DISTWIDTH] IN BLOOD BY AUTOMATED COUNT: 13.6 %
EST. GFR  (AFRICAN AMERICAN): >60 ML/MIN/1.73 M^2
EST. GFR  (NON AFRICAN AMERICAN): >60 ML/MIN/1.73 M^2
GLUCOSE SERPL-MCNC: 96 MG/DL
HCT VFR BLD AUTO: 28.4 %
HGB BLD-MCNC: 9.6 G/DL
LYMPHOCYTES # BLD AUTO: 1.1 K/UL
LYMPHOCYTES NFR BLD: 10.7 %
MCH RBC QN AUTO: 32.1 PG
MCHC RBC AUTO-ENTMCNC: 33.9 G/DL
MCV RBC AUTO: 95 FL
MONOCYTES # BLD AUTO: 0.9 K/UL
MONOCYTES NFR BLD: 8.4 %
NEUTROPHILS # BLD AUTO: 8.6 K/UL
NEUTROPHILS NFR BLD: 80.7 %
PLATELET # BLD AUTO: 153 K/UL
PMV BLD AUTO: 8.2 FL
POTASSIUM SERPL-SCNC: 4 MMOL/L
RBC # BLD AUTO: 3 M/UL
SODIUM SERPL-SCNC: 135 MMOL/L
WBC # BLD AUTO: 10.6 K/UL

## 2018-12-13 PROCEDURE — 80048 BASIC METABOLIC PNL TOTAL CA: CPT

## 2018-12-13 PROCEDURE — 99232 SBSQ HOSP IP/OBS MODERATE 35: CPT | Mod: ,,, | Performed by: INTERNAL MEDICINE

## 2018-12-13 PROCEDURE — 25000003 PHARM REV CODE 250: Performed by: INTERNAL MEDICINE

## 2018-12-13 PROCEDURE — 63600175 PHARM REV CODE 636 W HCPCS: Performed by: PHYSICIAN ASSISTANT

## 2018-12-13 PROCEDURE — 85025 COMPLETE CBC W/AUTO DIFF WBC: CPT

## 2018-12-13 PROCEDURE — 36415 COLL VENOUS BLD VENIPUNCTURE: CPT

## 2018-12-13 PROCEDURE — 97116 GAIT TRAINING THERAPY: CPT

## 2018-12-13 PROCEDURE — 94799 UNLISTED PULMONARY SVC/PX: CPT

## 2018-12-13 PROCEDURE — 94761 N-INVAS EAR/PLS OXIMETRY MLT: CPT

## 2018-12-13 PROCEDURE — 12000002 HC ACUTE/MED SURGE SEMI-PRIVATE ROOM

## 2018-12-13 PROCEDURE — 25000003 PHARM REV CODE 250: Performed by: PHYSICIAN ASSISTANT

## 2018-12-13 PROCEDURE — 97530 THERAPEUTIC ACTIVITIES: CPT

## 2018-12-13 RX ORDER — KETOROLAC TROMETHAMINE 30 MG/ML
30 INJECTION, SOLUTION INTRAMUSCULAR; INTRAVENOUS EVERY 6 HOURS
Status: COMPLETED | OUTPATIENT
Start: 2018-12-13 | End: 2018-12-14

## 2018-12-13 RX ORDER — MORPHINE SULFATE 15 MG/1
15 TABLET, FILM COATED, EXTENDED RELEASE ORAL EVERY 12 HOURS
Status: DISCONTINUED | OUTPATIENT
Start: 2018-12-13 | End: 2018-12-14 | Stop reason: HOSPADM

## 2018-12-13 RX ORDER — HYDROMORPHONE HYDROCHLORIDE 2 MG/1
4 TABLET ORAL
Status: DISCONTINUED | OUTPATIENT
Start: 2018-12-13 | End: 2018-12-14 | Stop reason: HOSPADM

## 2018-12-13 RX ADMIN — OXYCODONE HYDROCHLORIDE 15 MG: 5 TABLET ORAL at 12:12

## 2018-12-13 RX ADMIN — THERA TABS 1 TABLET: TAB at 08:12

## 2018-12-13 RX ADMIN — DOCUSATE SODIUM 100 MG: 100 CAPSULE, LIQUID FILLED ORAL at 08:12

## 2018-12-13 RX ADMIN — MORPHINE SULFATE 15 MG: 15 TABLET, FILM COATED, EXTENDED RELEASE ORAL at 08:12

## 2018-12-13 RX ADMIN — MUPIROCIN 1 G: 20 OINTMENT TOPICAL at 08:12

## 2018-12-13 RX ADMIN — SODIUM CHLORIDE, SODIUM LACTATE, POTASSIUM CHLORIDE, AND CALCIUM CHLORIDE 125 ML/HR: .6; .31; .03; .02 INJECTION, SOLUTION INTRAVENOUS at 06:12

## 2018-12-13 RX ADMIN — PANTOPRAZOLE SODIUM 40 MG: 40 TABLET, DELAYED RELEASE ORAL at 08:12

## 2018-12-13 RX ADMIN — MORPHINE SULFATE 15 MG: 15 TABLET, FILM COATED, EXTENDED RELEASE ORAL at 11:12

## 2018-12-13 RX ADMIN — MUPIROCIN 1 G: 20 OINTMENT TOPICAL at 10:12

## 2018-12-13 RX ADMIN — HYDROMORPHONE HYDROCHLORIDE 4 MG: 2 TABLET ORAL at 07:12

## 2018-12-13 RX ADMIN — HYDROMORPHONE HYDROCHLORIDE 2 MG: 2 INJECTION, SOLUTION INTRAMUSCULAR; INTRAVENOUS; SUBCUTANEOUS at 03:12

## 2018-12-13 RX ADMIN — KETOROLAC TROMETHAMINE 30 MG: 30 INJECTION, SOLUTION INTRAMUSCULAR at 06:12

## 2018-12-13 RX ADMIN — OXYCODONE HYDROCHLORIDE 15 MG: 5 TABLET ORAL at 02:12

## 2018-12-13 RX ADMIN — HYDROMORPHONE HYDROCHLORIDE 4 MG: 2 TABLET ORAL at 02:12

## 2018-12-13 RX ADMIN — HYDROMORPHONE HYDROCHLORIDE 2 MG: 2 INJECTION, SOLUTION INTRAMUSCULAR; INTRAVENOUS; SUBCUTANEOUS at 07:12

## 2018-12-13 RX ADMIN — OXYCODONE HYDROCHLORIDE 15 MG: 5 TABLET ORAL at 06:12

## 2018-12-13 NOTE — PLAN OF CARE
Problem: Adult Inpatient Plan of Care  Goal: Plan of Care Review  Outcome: Ongoing (interventions implemented as appropriate)  AAOx4. Plan of care reviewed with patient. Safety maintained throughout shift. Bed in low and locked position, side rails up x2, call light within reach. Repositions independently. Up to restroom with stand-by assist and walker. Dressing to back intact. Neurovascular checks q4. SCDs in use. PRN medication given for c/o pain. Hourly/q2 rounding utilized for pt safety. Will continue to monitor.

## 2018-12-13 NOTE — ASSESSMENT & PLAN NOTE
Cont to increase OOB activity  Plan for DC home today with HH  DC dilaudid  Add MS Contin 15mg q12h

## 2018-12-13 NOTE — PROGRESS NOTES
Progress Note  Hospital Medicine  Patient Name:Mal Larkin  MRN:  [id number]  Patient Class: IP- Inpatient  Admit Date: 12/11/2018  Length of Stay: 2 days  Expected Discharge Date:   Attending Physician: Betty Reis MD  Primary Care Provider:  Tito Marino MD    SUBJECTIVE:     Principal Problem: Spinal stenosis, lumbar region, with neurogenic claudication  Initial history of present illness: Patient is a 63 y.o. male admitted to Hospitalist Service from Operation [facility] s/p Re-exploration of L1, L2, L3 with L4-5 lumbar laminectomy performed by Dr. Cox. Patient reportedly has past medical history significant for hypertension, history of melanoma and colon polyps. Post-operatively, patient denied chest pain, shortness of breath, abdominal pain, nausea, vomiting, headache, vision changes, focal neuro-deficits, cough or fever.    PMH/PSH/SH/FH/Meds: reviewed.    Symptoms/Review of Systems: Looking and feeling well. Complaining of back pain. Working with PT.  No shortness of breath, cough, chest pain or headache, fever or abdominal pain.     Diet:  Adequate intake.    Activity level: Normal.    Pain:  Patient reports no pain.       OBJECTIVE:   Vital Signs (Most Recent):      Temp: 99.6 °F (37.6 °C) (12/13/18 0322)  Pulse: 84 (12/13/18 0322)  Resp: 18 (12/13/18 0322)  BP: 116/61 (12/13/18 0322)  SpO2: 97 % (12/13/18 0322)       Vital Signs Range (Last 24H):  Temp:  [98.4 °F (36.9 °C)-100.7 °F (38.2 °C)]   Pulse:  [75-87]   Resp:  [16-20]   BP: (113-125)/(56-63)   SpO2:  [94 %-98 %]     Weight: 83 kg (182 lb 15.7 oz)  Body mass index is 28.66 kg/m².    Intake/Output Summary (Last 24 hours) at 12/13/2018 0817  Last data filed at 12/13/2018 0600  Gross per 24 hour   Intake 2030 ml   Output 1100 ml   Net 930 ml     Physical Examination:  General appearance: well developed, appears stated age  Head: normocephalic, atraumatic  Eyes:  conjunctivae/corneas clear. PERRL.  Nose: Nares normal. Septum midline.  Throat:  lips, mucosa, and tongue normal; teeth and gums normal, no throat erythema.  Neck: supple, symmetrical, trachea midline, no JVD and thyroid not enlarged, symmetric, no tenderness/mass/nodules  Lungs:  clear to auscultation bilaterally and normal respiratory effort  Chest wall: no tenderness  Heart: regular rate and rhythm, S1, S2 normal, no murmur, click, rub or gallop  Abdomen: soft, non-tender non-distented; bowel sounds normal; no masses,  no organomegaly  Extremities: no cyanosis, clubbing or edema.   Pulses: 2+ and symmetric  Skin: Skin color, texture, turgor normal. No rashes or lesions.  Lymph nodes: Cervical, supraclavicular, and axillary nodes normal.  Neurologic: Sedated but grossly non-focal, moving extremities.     CBC:  Recent Labs   Lab 12/11/18  1340 12/12/18  0622 12/12/18  1751 12/13/18  0626   WBC 13.80* 9.20  --  10.60   RBC 3.73* 3.06*  --  3.00*   HGB 11.9* 9.9* 9.9* 9.6*   HCT 35.6* 29.1* 29.2* 28.4*    151  --  153   MCV 96 95  --  95   MCH 32.0* 32.3*  --  32.1*   MCHC 33.5 33.9  --  33.9   BMP  Recent Labs   Lab 12/11/18  1340 12/12/18  0622 12/13/18  0626   * 113* 96    135* 135*   K 4.4 4.3 4.0    102 101   CO2 24 29 29   BUN 11 11 9   CREATININE 0.7 0.7 0.7   CALCIUM 8.4* 8.1* 8.4*      Diagnostic Results:  Microbiology Results (last 7 days)     ** No results found for the last 168 hours. **         Assessment/Plan:      Spinal stenosis, lumbar region, with neurogenic claudication [M48.062] s/p Re-exploration of L1, L2, L3 with L4-5 lumbar laminectomy  Continue to follow Orthopedic recommendations.  Needs aggressive incentive spirometry.  Follow hemoglobin and hematocrit closely.  Pain control with PO narcotics and antiemetics as needed.  Physical therapy as per Orthopedics protocol with fall precautions.   Yes              Hypertension [I10]  Continue chronic medications and monitor for any changes, adjusting as needed.    Acute surgical blood loss  anemia  Follow CBC and transfuse as needed.      Yes       DVT prophylaxis: Use SCD and TEDs. No anticoagulation as per Dr. Cox.    Betty Reis MD  Department of Hospital Medicine   [facility]

## 2018-12-13 NOTE — PLAN OF CARE
Problem: Physical Therapy Goal  Goal: Physical Therapy Goal  Goals to be met by: 2018     Patient will increase functional independence with mobility by performin. Supine to sit with Contact Guard Assistance  2. Sit to stand transfer with Contact Guard Assistance  3. Bed to chair transfer with Contact Guard Assistance using Rolling Walker  4. Gait  x 250x2 feet with Contact Guard Assistance using Rolling Walker.   5. Lower extremity exercise program x20 reps per handout, with assistance as needed     Outcome: Ongoing (interventions implemented as appropriate)  Ambulated with rw and CGA with TLSO in  Place.

## 2018-12-13 NOTE — PLAN OF CARE
Problem: Adult Inpatient Plan of Care  Goal: Plan of Care Review  Outcome: Ongoing (interventions implemented as appropriate)  Pt with ongoing pain management with IV and PO analgesics. Pt ambulates in room with steady gait and rolling walker and standby assist. Current dressing done at 6pm with abd pads and well taped dry and intact. Continue to monitor pt closely. Call light in easy reach.

## 2018-12-13 NOTE — PROGRESS NOTES
12/12/18 1940   Incentive Spirometer   $ Incentive Spirometer Charges ready for self-administration;proper technique demonstrated;breath hold utilized   Incentive Spirometer Predicted Level (mL) 3000   Administration (IS) instruction provided, follow-up;proper technique demonstrated;self-administered   Number of Repetitions (IS) 10   Level Incentive Spirometer (mL) 3000   Patient Tolerance (IS) good

## 2018-12-13 NOTE — PT/OT/SLP PROGRESS
Physical Therapy      Patient Name:  Mal Larkin   MRN:  [id number]    Patient not seen today secondary to Patient unwilling to participate(Patient reports too much pain to ambulate. Stated, the medicicne he gets does not work.  Only dilaudid works. Nurse Ann ramirez.). Will follow-up 12/14/18.    Barb Mota PTA

## 2018-12-13 NOTE — SUBJECTIVE & OBJECTIVE
"Principal Problem:Spinal stenosis, lumbar region, with neurogenic claudication    Principal Orthopedic Problem: S/P L1-S1 Lum/ Connelly PSF    Interval History: none    Review of patient's allergies indicates:  No Known Allergies    Current Facility-Administered Medications   Medication    acetaminophen tablet 650 mg    aluminum-magnesium hydroxide-simethicone 200-200-20 mg/5 mL suspension 30 mL    bisacodyl suppository 10 mg    diphenhydrAMINE capsule 50 mg    docusate sodium capsule 100 mg    hydromorphone (PF) injection 2 mg    lactated ringers infusion    multivitamin tablet 1 tablet    mupirocin 2 % ointment 1 g    naloxone 0.4 mg/mL injection 0.02 mg    ondansetron disintegrating tablet 8 mg    oxyCODONE immediate release tablet 10 mg    oxyCODONE immediate release tablet 15 mg    oxyCODONE immediate release tablet 5 mg    pantoprazole EC tablet 40 mg    promethazine (PHENERGAN) 12.5 mg in dextrose 5 % 50 mL IVPB    ramelteon tablet 8 mg    senna-docusate 8.6-50 mg per tablet 2 tablet    sodium chloride 0.9% flush 3 mL     Objective:     Vital Signs (Most Recent):  Temp: 99.6 °F (37.6 °C) (12/13/18 0835)  Pulse: 88 (12/13/18 0835)  Resp: 18 (12/13/18 0835)  BP: 118/66 (12/13/18 0835)  SpO2: 96 % (12/13/18 0835) Vital Signs (24h Range):  Temp:  [98.9 °F (37.2 °C)-100.7 °F (38.2 °C)] 99.6 °F (37.6 °C)  Pulse:  [75-88] 88  Resp:  [16-18] 18  SpO2:  [94 %-98 %] 96 %  BP: (113-119)/(56-66) 118/66     Weight: 83 kg (182 lb 15.7 oz)  Height: 5' 7" (170.2 cm)  Body mass index is 28.66 kg/m².      Intake/Output Summary (Last 24 hours) at 12/13/2018 0903  Last data filed at 12/13/2018 0600  Gross per 24 hour   Intake 2030 ml   Output 1100 ml   Net 930 ml       General    Nursing note and vitals reviewed.  Constitutional: He is oriented to person, place, and time. He appears well-developed and well-nourished.   Pulmonary/Chest: Effort normal.   Abdominal: Soft.   Neurological: He is alert and oriented to " person, place, and time.   Psychiatric: He has a normal mood and affect. His behavior is normal.         Back (L-Spine & T-Spine) / Neck (C-Spine) Exam     Comments:  Extensive post lumbar incision/ dressing C/D/I. BLEs DNVI.        Significant Labs:   CBC:   Recent Labs   Lab 12/11/18  1340 12/12/18  0622 12/12/18  1751 12/13/18  0626   WBC 13.80* 9.20  --  10.60   HGB 11.9* 9.9* 9.9* 9.6*   HCT 35.6* 29.1* 29.2* 28.4*    151  --  153     CMP:   Recent Labs   Lab 12/11/18  1340 12/12/18  0622 12/13/18  0626    135* 135*   K 4.4 4.3 4.0    102 101   CO2 24 29 29   * 113* 96   BUN 11 11 9   CREATININE 0.7 0.7 0.7   CALCIUM 8.4* 8.1* 8.4*   ANIONGAP 10 4* 5*   EGFRNONAA >60 >60 >60     All pertinent labs within the past 24 hours have been reviewed.    Significant Imaging: None

## 2018-12-13 NOTE — PT/OT/SLP PROGRESS
Physical Therapy Treatment    Patient Name:  Mal Larkin   MRN:  [id number]    Recommendations:     Discharge Recommendations:      Discharge Equipment Recommendations:     Barriers to discharge: None    Assessment:     Mal Larkin is a 63 y.o. male admitted with a medical diagnosis of Spinal stenosis, lumbar region, with neurogenic claudication.  He presents with the following impairments/functional limitations:  weakness, impaired functional mobilty, pain, orthopedic precautions .    Rehab Prognosis: good; patient would benefit from acute skilled PT services to address these deficits and reach maximum level of function.      Recent Surgery: Procedure(s) (LRB):  LAMINECTOMY, SPINE, LUMBAR L1, L2, L3 with  L4-L5 Re-exploration (N/A)  FUSION, SPINE, LUMBAR, PLIF L1-2, L2-3, L3-4, L4-5, L5-S1 (N/A) 2 Days Post-Op    Plan:     During this hospitalization, patient to be seen BID(daily Sat and Sun) to address the above listed problems via gait training, therapeutic activities, therapeutic exercises  · Plan of Care Expires:  12/28/18   Plan of Care Reviewed with: patient    Subjective     Communicated with nurse Tucker prior to session.  Patient found supine in bed upon PT entry to room, agreeable to treatment.      Chief Complaint: pain in back. Received pain meds already.  Patient comments/goals: to return home  Pain/Comfort:  · Pain Rating 1: 7/10  · Location - Orientation 1: generalized  · Location 1: back  · Pain Addressed 1: Pre-medicate for activity, Reposition, Nurse notified    Patients cultural, spiritual, Rastafari conflicts given the current situation:      Objective:     Patient found with: SCD     General Precautions: Standard, fall   Orthopedic Precautions:spinal precautions   Braces: TLSO(applied prior to ambulation)     Functional Mobility:  · Bed Mobility:     · Rolling Left:  minimum assistance  · Supine to Sit: minimum assistance  · Transfers:     · Sit to Stand:  minimum assistance with  rolling walker  · Gait: 10' to restroom, 250' with rw and CGA with TLSO in place.      AM-PAC 6 CLICK MOBILITY          Therapeutic Activities and Exercises:   Transferred to sitting EOB with A. TLSO applied. Ambulated to restroom , stood to void but unsuccessful. Ambulated in hallway following with rw and CGA. Returned to bed ( declined sitting in chair) , TLSO removed.    Patient left up in chair with all lines intact, call button in reach and nurse Caitlin notified..    GOALS:   Multidisciplinary Problems     Physical Therapy Goals        Problem: Physical Therapy Goal    Goal Priority Disciplines Outcome Goal Variances Interventions   Physical Therapy Goal     PT, PT/OT Ongoing (interventions implemented as appropriate)     Description:  Goals to be met by: 2018     Patient will increase functional independence with mobility by performin. Supine to sit with Contact Guard Assistance  2. Sit to stand transfer with Contact Guard Assistance  3. Bed to chair transfer with Contact Guard Assistance using Rolling Walker  4. Gait  x 250x2 feet with Contact Guard Assistance using Rolling Walker.   5. Lower extremity exercise program x20 reps per handout, with assistance as needed                      Time Tracking:     PT Received On: 18  PT Start Time: 915     PT Stop Time: 935  PT Total Time (min): 20 min     Billable Minutes: Gait Training 12min and Therapeutic Activity 8min    Treatment Type: Treatment  PT/PTA: PTA     PTA Visit Number: 1     Barb Mota PTA  2018

## 2018-12-13 NOTE — PROGRESS NOTES
[facility]  Orthopedics  Progress Note    Patient Name: Mal Larkin  MRN: [id number]  Admission Date: 12/11/2018  Hospital Length of Stay: 2 days  Attending Provider: Betty Reis MD  Primary Care Provider: Tito Marino MD  Follow-up For: Procedure(s) (LRB):  LAMINECTOMY, SPINE, LUMBAR L1, L2, L3 with  L4-L5 Re-exploration (N/A)  FUSION, SPINE, LUMBAR, PLIF L1-2, L2-3, L3-4, L4-5, L5-S1 (N/A)    Post-Operative Day: 2 Days Post-Op  Subjective:     Principal Problem:Spinal stenosis, lumbar region, with neurogenic claudication    Principal Orthopedic Problem: S/P L1-S1 Lum/ Connelly PSF    Interval History: none    Review of patient's allergies indicates:  No Known Allergies    Current Facility-Administered Medications   Medication    acetaminophen tablet 650 mg    aluminum-magnesium hydroxide-simethicone 200-200-20 mg/5 mL suspension 30 mL    bisacodyl suppository 10 mg    diphenhydrAMINE capsule 50 mg    docusate sodium capsule 100 mg    hydromorphone (PF) injection 2 mg    lactated ringers infusion    multivitamin tablet 1 tablet    mupirocin 2 % ointment 1 g    naloxone 0.4 mg/mL injection 0.02 mg    ondansetron disintegrating tablet 8 mg    oxyCODONE immediate release tablet 10 mg    oxyCODONE immediate release tablet 15 mg    oxyCODONE immediate release tablet 5 mg    pantoprazole EC tablet 40 mg    promethazine (PHENERGAN) 12.5 mg in dextrose 5 % 50 mL IVPB    ramelteon tablet 8 mg    senna-docusate 8.6-50 mg per tablet 2 tablet    sodium chloride 0.9% flush 3 mL     Objective:     Vital Signs (Most Recent):  Temp: 99.6 °F (37.6 °C) (12/13/18 0835)  Pulse: 88 (12/13/18 0835)  Resp: 18 (12/13/18 0835)  BP: 118/66 (12/13/18 0835)  SpO2: 96 % (12/13/18 0835) Vital Signs (24h Range):  Temp:  [98.9 °F (37.2 °C)-100.7 °F (38.2 °C)] 99.6 °F (37.6 °C)  Pulse:  [75-88] 88  Resp:  [16-18] 18  SpO2:  [94 %-98 %] 96 %  BP: (113-119)/(56-66) 118/66     Weight: 83 kg (182 lb 15.7  "oz)  Height: 5' 7" (170.2 cm)  Body mass index is 28.66 kg/m².      Intake/Output Summary (Last 24 hours) at 12/13/2018 0903  Last data filed at 12/13/2018 0600  Gross per 24 hour   Intake 2030 ml   Output 1100 ml   Net 930 ml       General    Nursing note and vitals reviewed.  Constitutional: He is oriented to person, place, and time. He appears well-developed and well-nourished.   Pulmonary/Chest: Effort normal.   Abdominal: Soft.   Neurological: He is alert and oriented to person, place, and time.   Psychiatric: He has a normal mood and affect. His behavior is normal.         Back (L-Spine & T-Spine) / Neck (C-Spine) Exam     Comments:  Extensive post lumbar incision/ dressing C/D/I. BLEs DNVI.        Significant Labs:   CBC:   Recent Labs   Lab 12/11/18  1340 12/12/18  0622 12/12/18  1751 12/13/18  0626   WBC 13.80* 9.20  --  10.60   HGB 11.9* 9.9* 9.9* 9.6*   HCT 35.6* 29.1* 29.2* 28.4*    151  --  153     CMP:   Recent Labs   Lab 12/11/18  1340 12/12/18  0622 12/13/18  0626    135* 135*   K 4.4 4.3 4.0    102 101   CO2 24 29 29   * 113* 96   BUN 11 11 9   CREATININE 0.7 0.7 0.7   CALCIUM 8.4* 8.1* 8.4*   ANIONGAP 10 4* 5*   EGFRNONAA >60 >60 >60     All pertinent labs within the past 24 hours have been reviewed.    Significant Imaging: None    Assessment/Plan:     * Spinal stenosis, lumbar region, with neurogenic claudication    Cont to increase OOB activity  Plan for DC home today with HH  DC dilaudid  Add MS Contin 15mg q12h           JEANNIE RUBIO  Orthopedics  [facility]  "

## 2018-12-14 VITALS
HEIGHT: 67 IN | OXYGEN SATURATION: 98 % | TEMPERATURE: 98 F | WEIGHT: 183 LBS | RESPIRATION RATE: 16 BRPM | BODY MASS INDEX: 28.72 KG/M2 | DIASTOLIC BLOOD PRESSURE: 72 MMHG | HEART RATE: 87 BPM | SYSTOLIC BLOOD PRESSURE: 146 MMHG

## 2018-12-14 LAB
ANION GAP SERPL CALC-SCNC: 8 MMOL/L
BASOPHILS # BLD AUTO: 0 K/UL
BASOPHILS NFR BLD: 0.3 %
BUN SERPL-MCNC: 12 MG/DL
CALCIUM SERPL-MCNC: 8.7 MG/DL
CHLORIDE SERPL-SCNC: 100 MMOL/L
CO2 SERPL-SCNC: 27 MMOL/L
CREAT SERPL-MCNC: 0.7 MG/DL
DIFFERENTIAL METHOD: ABNORMAL
EOSINOPHIL # BLD AUTO: 0 K/UL
EOSINOPHIL NFR BLD: 0.1 %
ERYTHROCYTE [DISTWIDTH] IN BLOOD BY AUTOMATED COUNT: 13.6 %
EST. GFR  (AFRICAN AMERICAN): >60 ML/MIN/1.73 M^2
EST. GFR  (NON AFRICAN AMERICAN): >60 ML/MIN/1.73 M^2
GLUCOSE SERPL-MCNC: 95 MG/DL
HCT VFR BLD AUTO: 29.3 %
HGB BLD-MCNC: 10 G/DL
LYMPHOCYTES # BLD AUTO: 1.3 K/UL
LYMPHOCYTES NFR BLD: 11.7 %
MCH RBC QN AUTO: 32.5 PG
MCHC RBC AUTO-ENTMCNC: 34.2 G/DL
MCV RBC AUTO: 95 FL
MONOCYTES # BLD AUTO: 0.7 K/UL
MONOCYTES NFR BLD: 6.3 %
NEUTROPHILS # BLD AUTO: 9 K/UL
NEUTROPHILS NFR BLD: 81.6 %
PLATELET # BLD AUTO: 176 K/UL
PMV BLD AUTO: 8.4 FL
POTASSIUM SERPL-SCNC: 4.3 MMOL/L
RBC # BLD AUTO: 3.08 M/UL
SODIUM SERPL-SCNC: 135 MMOL/L
WBC # BLD AUTO: 11 K/UL

## 2018-12-14 PROCEDURE — 63600175 PHARM REV CODE 636 W HCPCS: Performed by: PHYSICIAN ASSISTANT

## 2018-12-14 PROCEDURE — 99238 HOSP IP/OBS DSCHRG MGMT 30/<: CPT | Mod: ,,, | Performed by: INTERNAL MEDICINE

## 2018-12-14 PROCEDURE — 85025 COMPLETE CBC W/AUTO DIFF WBC: CPT

## 2018-12-14 PROCEDURE — 25000003 PHARM REV CODE 250: Performed by: INTERNAL MEDICINE

## 2018-12-14 PROCEDURE — 97116 GAIT TRAINING THERAPY: CPT

## 2018-12-14 PROCEDURE — 80048 BASIC METABOLIC PNL TOTAL CA: CPT

## 2018-12-14 PROCEDURE — 25000003 PHARM REV CODE 250: Performed by: PHYSICIAN ASSISTANT

## 2018-12-14 PROCEDURE — 97530 THERAPEUTIC ACTIVITIES: CPT

## 2018-12-14 PROCEDURE — 36415 COLL VENOUS BLD VENIPUNCTURE: CPT

## 2018-12-14 RX ORDER — HYDROMORPHONE HYDROCHLORIDE 4 MG/1
4 TABLET ORAL EVERY 4 HOURS PRN
Refills: 0
Start: 2018-12-14 | End: 2018-12-26

## 2018-12-14 RX ORDER — MORPHINE SULFATE 15 MG/1
15 TABLET, FILM COATED, EXTENDED RELEASE ORAL 2 TIMES DAILY
Refills: 0
Start: 2018-12-14 | End: 2018-12-26

## 2018-12-14 RX ADMIN — HYDROMORPHONE HYDROCHLORIDE 4 MG: 2 TABLET ORAL at 11:12

## 2018-12-14 RX ADMIN — PANTOPRAZOLE SODIUM 40 MG: 40 TABLET, DELAYED RELEASE ORAL at 09:12

## 2018-12-14 RX ADMIN — HYDROMORPHONE HYDROCHLORIDE 4 MG: 2 TABLET ORAL at 12:12

## 2018-12-14 RX ADMIN — THERA TABS 1 TABLET: TAB at 09:12

## 2018-12-14 RX ADMIN — KETOROLAC TROMETHAMINE 30 MG: 30 INJECTION, SOLUTION INTRAMUSCULAR at 06:12

## 2018-12-14 RX ADMIN — HYDROMORPHONE HYDROCHLORIDE 4 MG: 2 TABLET ORAL at 05:12

## 2018-12-14 RX ADMIN — BISACODYL 10 MG: 10 SUPPOSITORY RECTAL at 09:12

## 2018-12-14 RX ADMIN — HYDROMORPHONE HYDROCHLORIDE 4 MG: 2 TABLET ORAL at 02:12

## 2018-12-14 RX ADMIN — MORPHINE SULFATE 15 MG: 15 TABLET, FILM COATED, EXTENDED RELEASE ORAL at 09:12

## 2018-12-14 RX ADMIN — DOCUSATE SODIUM 100 MG: 100 CAPSULE, LIQUID FILLED ORAL at 09:12

## 2018-12-14 RX ADMIN — KETOROLAC TROMETHAMINE 30 MG: 30 INJECTION, SOLUTION INTRAMUSCULAR at 12:12

## 2018-12-14 RX ADMIN — HYDROMORPHONE HYDROCHLORIDE 4 MG: 2 TABLET ORAL at 04:12

## 2018-12-14 RX ADMIN — MUPIROCIN 1 G: 20 OINTMENT TOPICAL at 09:12

## 2018-12-14 NOTE — DISCHARGE SUMMARY
Discharge Summary  Hospital Medicine    Admit Date: 12/11/2018    Date and Time: 12/14/20188:49 AM    Discharge Attending Physician: Betty Reis MD    Primary Care Physician: Tito Marino MD    Diagnoses:  Active Hospital Problems    Diagnosis  POA    *Spinal stenosis, lumbar region, with neurogenic claudication [M48.062] s/p Re-exploration of L1, L2, L3 with L4-5 lumbar laminectomy  Yes    DDD (degenerative disc disease), lumbar [M51.36]  Unknown    Obesity (BMI 30-39.9) [E66.9]  Yes    Hypertension [I10]  Yes        Discharged Condition: Good    Hospital Course:   Patient is a 63 y.o. male admitted to Hospitalist Service from Operation Room s/p Re-exploration of L1, L2, L3 with L4-5 lumbar laminectomy performed by Dr. Cox. Patient reportedly has past medical history significant for hypertension, history of melanoma and colon polyps. Post-operatively, patient denied chest pain, shortness of breath, abdominal pain, nausea, vomiting, headache, vision changes, focal neuro-deficits, cough or fever. Patient was admitted to Hospitalist medicine service. Patient was followed by orthopedics. Post-operative, patient did well. Pain adequately controlled. Patient participated with physical therapy. Home health and home physical therapy has been arranged. Fall precautions discussed with the patient. Patient to follow up with primary care physician next week and orthopedic doctor in 2 weeks. In case of chest pain, shortness of breath, stroke or stroke like symptoms, high grade fever or any signs or symptoms of surgical site wound infection symptoms, patient to return to nearest emergency room as soon as possible. Patient was discharged home in stable condition with following discharge plan of care.     Consults: Dr. Cox    Significant Diagnostic Studies:     Microbiology Results (last 7 days)     ** No results found for the last 168 hours. **        Special Treatments/Procedures: as above  Disposition: Home or  Self Care    Medications:  Reconciled Home Medications:   Current Discharge Medication List      START taking these medications    Details   HYDROmorphone (DILAUDID) 4 MG tablet Take 1 tablet (4 mg total) by mouth every 4 (four) hours as needed for Pain.  Refills: 0      morphine (MS CONTIN) 15 MG 12 hr tablet Take 1 tablet (15 mg total) by mouth 2 (two) times daily.  Refills: 0         CONTINUE these medications which have NOT CHANGED    Details   calcium-vitamin D3 (CALCIUM 500 + D) 500 mg(1,250mg) -200 unit per tablet Take 1 tablet by mouth Daily.      multivitamin (ONE DAILY MULTIVITAMIN) per tablet Take 1 tablet by mouth once daily.      naproxen sodium (ANAPROX) 220 MG tablet Take 220 mg by mouth every 12 (twelve) hours.           Discharge Procedure Orders   Ambulatory referral to Home Health   Referral Priority: Routine Referral Type: Home Health   Referral Reason: Specialty Services Required   Requested Specialty: Home Health Services   Number of Visits Requested: 1     Diet Cardiac     Other restrictions (specify):   Order Comments: PLEASE OBSERVE FALL PRECAUTIONS     Call MD for:   Order Comments: For worsening symptoms, chest pain, shortness of breath, increased abdominal pain, high grade fever, stroke or stroke like symptoms, immediately go to the nearest Emergency Room or call 911 as soon as possible.     Follow-up Information     Melissa Marino MD In 1 week.    Specialty:  Family Medicine  Contact information:  [address]  [place] [address]  [phone]             Vik Cox MD In 2 weeks.    Specialties:  Orthopedic Surgery, General Surgery, Surgery  Contact information:  [address]  [address]  [place] [address]  [phone]

## 2018-12-14 NOTE — SUBJECTIVE & OBJECTIVE
"Principal Problem:Spinal stenosis, lumbar region, with neurogenic claudication    Principal Orthopedic Problem: S/P L1-S1 Lum/ Connelly PLF    Interval History: pain    Review of patient's allergies indicates:  No Known Allergies    Current Facility-Administered Medications   Medication    acetaminophen tablet 650 mg    aluminum-magnesium hydroxide-simethicone 200-200-20 mg/5 mL suspension 30 mL    bisacodyl suppository 10 mg    diphenhydrAMINE capsule 50 mg    docusate sodium capsule 100 mg    HYDROmorphone tablet 4 mg    lactated ringers infusion    morphine 12 hr tablet 15 mg    multivitamin tablet 1 tablet    mupirocin 2 % ointment 1 g    naloxone 0.4 mg/mL injection 0.02 mg    ondansetron disintegrating tablet 8 mg    pantoprazole EC tablet 40 mg    promethazine (PHENERGAN) 12.5 mg in dextrose 5 % 50 mL IVPB    ramelteon tablet 8 mg    senna-docusate 8.6-50 mg per tablet 2 tablet    sodium chloride 0.9% flush 3 mL     Objective:     Vital Signs (Most Recent):  Temp: 98.9 °F (37.2 °C) (12/14/18 0747)  Pulse: 77 (12/14/18 0747)  Resp: 18 (12/14/18 0747)  BP: 120/66 (12/14/18 0747)  SpO2: 98 % (12/14/18 0747) Vital Signs (24h Range):  Temp:  [98.3 °F (36.8 °C)-99.6 °F (37.6 °C)] 98.9 °F (37.2 °C)  Pulse:  [77-95] 77  Resp:  [14-20] 18  SpO2:  [94 %-98 %] 98 %  BP: (118-143)/(64-70) 120/66     Weight: 83 kg (182 lb 15.7 oz)  Height: 5' 7" (170.2 cm)  Body mass index is 28.66 kg/m².      Intake/Output Summary (Last 24 hours) at 12/14/2018 0754  Last data filed at 12/14/2018 0600  Gross per 24 hour   Intake 650 ml   Output --   Net 650 ml       General    Nursing note and vitals reviewed.  Constitutional: He is oriented to person, place, and time. He appears well-developed and well-nourished.   Pulmonary/Chest: Effort normal.   Abdominal: Soft.   Neurological: He is alert and oriented to person, place, and time.   Psychiatric: He has a normal mood and affect. His behavior is normal.         Back (L-Spine " & T-Spine) / Neck (C-Spine) Exam     Comments:  Post lumbar incision mild bloody drainage. BLEs DNVI        Significant Labs:   CBC:   Recent Labs   Lab 12/12/18  1751 12/13/18  0626 12/14/18  0535   WBC  --  10.60 11.00   HGB 9.9* 9.6* 10.0*   HCT 29.2* 28.4* 29.3*   PLT  --  153 176     CMP:   Recent Labs   Lab 12/13/18  0626 12/14/18  0535   * 135*   K 4.0 4.3    100   CO2 29 27   GLU 96 95   BUN 9 12   CREATININE 0.7 0.7   CALCIUM 8.4* 8.7   ANIONGAP 5* 8   EGFRNONAA >60 >60     All pertinent labs within the past 24 hours have been reviewed.    Significant Imaging: None

## 2018-12-14 NOTE — PLAN OF CARE
"Problem: Adult Inpatient Plan of Care  Goal: Plan of Care Review  Outcome: Ongoing (interventions implemented as appropriate)  Plan of care review with pt, pt states "understanding," no redness/swelling noted to IV site, staples to back remains dry/intact, dressing change performed, pt tolerated well, ambulating to bathroom with contact guard assist, will continue to monitor, observe and note any changes, safety maintain        "

## 2018-12-14 NOTE — NURSING
Thorough discharge instructions given to patient, no questions. Paper scripts given. Wheeled to car with wife. Iv catheter intact.

## 2018-12-14 NOTE — PLAN OF CARE
Problem: Adult Inpatient Plan of Care  Goal: Plan of Care Review  Outcome: Ongoing (interventions implemented as appropriate)  Patient aao x 4. Complains of uncontrolled incisional back pain. Provider informed, medications adjusted. Plan of care reviewed with patient, verbalized understanding. Worked with PT, refused to sit in chair. Ambulates with 1 assist to restroom. Remained free from fall and injury. Bed in lowest position and call light within reach.

## 2018-12-14 NOTE — PROGRESS NOTES
"[facility]  Orthopedics  Progress Note    Patient Name: Mal Larkin  MRN: [id number]  Admission Date: 12/11/2018  Hospital Length of Stay: 3 days  Attending Provider: Betty Reis MD  Primary Care Provider: Tito Marino MD  Follow-up For: Procedure(s) (LRB):  LAMINECTOMY, SPINE, LUMBAR L1, L2, L3 with  L4-L5 Re-exploration (N/A)  FUSION, SPINE, LUMBAR, PLIF L1-2, L2-3, L3-4, L4-5, L5-S1 (N/A)    Post-Operative Day: 3 Days Post-Op  Subjective:     Principal Problem:Spinal stenosis, lumbar region, with neurogenic claudication    Principal Orthopedic Problem: S/P L1-S1 Lum/ Connelly PLF    Interval History: pain    Review of patient's allergies indicates:  No Known Allergies    Current Facility-Administered Medications   Medication    acetaminophen tablet 650 mg    aluminum-magnesium hydroxide-simethicone 200-200-20 mg/5 mL suspension 30 mL    bisacodyl suppository 10 mg    diphenhydrAMINE capsule 50 mg    docusate sodium capsule 100 mg    HYDROmorphone tablet 4 mg    lactated ringers infusion    morphine 12 hr tablet 15 mg    multivitamin tablet 1 tablet    mupirocin 2 % ointment 1 g    naloxone 0.4 mg/mL injection 0.02 mg    ondansetron disintegrating tablet 8 mg    pantoprazole EC tablet 40 mg    promethazine (PHENERGAN) 12.5 mg in dextrose 5 % 50 mL IVPB    ramelteon tablet 8 mg    senna-docusate 8.6-50 mg per tablet 2 tablet    sodium chloride 0.9% flush 3 mL     Objective:     Vital Signs (Most Recent):  Temp: 98.9 °F (37.2 °C) (12/14/18 0747)  Pulse: 77 (12/14/18 0747)  Resp: 18 (12/14/18 0747)  BP: 120/66 (12/14/18 0747)  SpO2: 98 % (12/14/18 0747) Vital Signs (24h Range):  Temp:  [98.3 °F (36.8 °C)-99.6 °F (37.6 °C)] 98.9 °F (37.2 °C)  Pulse:  [77-95] 77  Resp:  [14-20] 18  SpO2:  [94 %-98 %] 98 %  BP: (118-143)/(64-70) 120/66     Weight: 83 kg (182 lb 15.7 oz)  Height: 5' 7" (170.2 cm)  Body mass index is 28.66 kg/m².      Intake/Output Summary (Last 24 hours) at " 12/14/2018 0754  Last data filed at 12/14/2018 0600  Gross per 24 hour   Intake 650 ml   Output --   Net 650 ml       General    Nursing note and vitals reviewed.  Constitutional: He is oriented to person, place, and time. He appears well-developed and well-nourished.   Pulmonary/Chest: Effort normal.   Abdominal: Soft.   Neurological: He is alert and oriented to person, place, and time.   Psychiatric: He has a normal mood and affect. His behavior is normal.         Back (L-Spine & T-Spine) / Neck (C-Spine) Exam     Comments:  Post lumbar incision mild bloody drainage. BLEs DNVI        Significant Labs:   CBC:   Recent Labs   Lab 12/12/18  1751 12/13/18  0626 12/14/18  0535   WBC  --  10.60 11.00   HGB 9.9* 9.6* 10.0*   HCT 29.2* 28.4* 29.3*   PLT  --  153 176     CMP:   Recent Labs   Lab 12/13/18  0626 12/14/18  0535   * 135*   K 4.0 4.3    100   CO2 29 27   GLU 96 95   BUN 9 12   CREATININE 0.7 0.7   CALCIUM 8.4* 8.7   ANIONGAP 5* 8   EGFRNONAA >60 >60     All pertinent labs within the past 24 hours have been reviewed.    Significant Imaging: None    Assessment/Plan:     * Spinal stenosis, lumbar region, with neurogenic claudication    Cont to increase OOB activity  Plan for DC home today with HH  Post-op Rxs written  Change dressing prior to DC           JEANNIE RUBIO  Orthopedics  [facility]

## 2018-12-14 NOTE — PT/OT/SLP PROGRESS
Physical Therapy Treatment    Patient Name:  Mal Larkin   MRN:  [id number]    Recommendations:     Discharge Recommendations:      Discharge Equipment Recommendations:     Barriers to discharge: None    Assessment:     Mal Larikn is a 63 y.o. male admitted with a medical diagnosis of Spinal stenosis, lumbar region, with neurogenic claudication.  He presents with the following impairments/functional limitations:  weakness, impaired functional mobilty, decreased lower extremity function, pain, orthopedic precautions .Declines use of brace.    Rehab Prognosis: good; patient would benefit from acute skilled PT services to address these deficits and reach maximum level of function.      Recent Surgery: Procedure(s) (LRB):  LAMINECTOMY, SPINE, LUMBAR L1, L2, L3 with  L4-L5 Re-exploration (N/A)  FUSION, SPINE, LUMBAR, PLIF L1-2, L2-3, L3-4, L4-5, L5-S1 (N/A) 3 Days Post-Op    Plan:     During this hospitalization, patient to be seen BID(daily Sat and Sun) to address the above listed problems via gait training, therapeutic activities, therapeutic exercises  · Plan of Care Expires:  12/28/18   Plan of Care Reviewed with: patient    Subjective     Communicated with nurse Neely prior to session.  Patient found supine in bed upon PT entry to room, agreeable to treatment.      Chief Complaint: lack of sleep  Patient comments/goals: to return home  Pain/Comfort:  · Pain Rating 1: other (see comments)(did not rate)  · Location - Orientation 1: generalized  · Location 1: back  · Pain Addressed 1: Pre-medicate for activity, Reposition, Nurse notified    Patients cultural, spiritual, Jehovah's witness conflicts given the current situation:      Objective:     Patient found with: SCD     General Precautions: Standard, fall   Orthopedic Precautions:spinal precautions   Braces: TLSO(declines wearing.)     Functional Mobility:  · Bed Mobility:     · Rolling Left:  contact guard assistance  · Rolling Right: contact guard  assistance  · Supine to Sit: minimum assistance  · Sit to Supine: minimum assistance  · Transfers:     · Sit to Stand:  contact guard assistance with rolling walker  · Gait: 250' with rw and CGA .      AM-PAC 6 CLICK MOBILITY          Therapeutic Activities and Exercises:   Transferred to sitting EOB and A. Ambulated in hallway with rw and CGA. ( declined use of TLSO). Stood at toilet to void with SBA.    Patient left supine with all lines intact, call button in reach and nurse Florinda notified..    GOALS:   Multidisciplinary Problems     Physical Therapy Goals        Problem: Physical Therapy Goal    Goal Priority Disciplines Outcome Goal Variances Interventions   Physical Therapy Goal     PT, PT/OT Ongoing (interventions implemented as appropriate)     Description:  Goals to be met by: 2018     Patient will increase functional independence with mobility by performin. Supine to sit with Contact Guard Assistance  2. Sit to stand transfer with Contact Guard Assistance  3. Bed to chair transfer with Contact Guard Assistance using Rolling Walker  4. Gait  x 250x2 feet with Contact Guard Assistance using Rolling Walker.   5. Lower extremity exercise program x20 reps per handout, with assistance as needed                      Time Tracking:     PT Received On: 18  PT Start Time: 1005     PT Stop Time: 1021  PT Total Time (min): 16 min     Billable Minutes: Gait Training 8min and Therapeutic Activity 8min    Treatment Type: Treatment  PT/PTA: PTA     PTA Visit Number: 2     Barb Mota PTA  2018

## 2018-12-14 NOTE — ASSESSMENT & PLAN NOTE
Cont to increase OOB activity  Plan for DC home today with HH  Post-op Rxs written  Change dressing prior to DC

## 2018-12-14 NOTE — PLAN OF CARE
Problem: Physical Therapy Goal  Goal: Physical Therapy Goal  Goals to be met by: 2018     Patient will increase functional independence with mobility by performin. Supine to sit with Contact Guard Assistance  2. Sit to stand transfer with Contact Guard Assistance  3. Bed to chair transfer with Contact Guard Assistance using Rolling Walker  4. Gait  x 250x2 feet with Contact Guard Assistance using Rolling Walker.   5. Lower extremity exercise program x20 reps per handout, with assistance as needed     Outcome: Ongoing (interventions implemented as appropriate)  Ambulated with rw and CGA for safety.

## 2018-12-14 NOTE — PT/OT/SLP PROGRESS
"Physical Therapy      Patient Name:  Mal Larkin   MRN:  [id number]    Patient not seen today secondary to Patient unwilling to participate(Refused OOB , " I want to rest. I will be going  home in 2 hours. ".Nurse Ann informed. ).     Barb Mota PTA    "

## 2018-12-14 NOTE — PLAN OF CARE
Problem: Adult Inpatient Plan of Care  Goal: Plan of Care Review  Outcome: Ongoing (interventions implemented as appropriate)  Patient AAO, VSS. PIV restarted this shift.  Tolerating reg diet.  Up with minimal assist/walker.  C/O 10/10 pain throughout shift.  Sleeping in between care. Dressing changed this shift.  Moderate drainage. Pt verbalized understanding of POC. Q2hr rounding done during shift to promote patient safety. Patient free from falls and injury during shift.  Bed in lowest position, brakes locked, and call light within reach.  Will continue to monitor.

## 2018-12-14 NOTE — PROGRESS NOTES
12/13/18 2000   Patient Assessment/Suction   Level of Consciousness (AVPU) alert   Respiratory Effort Normal;Unlabored   PRE-TX-O2-ETCO2   O2 Device (Oxygen Therapy) room air   SpO2 98 %   Pulse Oximetry Type Intermittent   Pulse 86   Incentive Spirometer   $ Incentive Spirometer Charges done with encouragement   Administration (IS) proper technique demonstrated   Number of Repetitions (IS) 10   Level Incentive Spirometer (mL) 2500   Patient Tolerance (IS) good

## 2018-12-14 NOTE — PLAN OF CARE
Spoke with the pt regarding home health; he is refusing home health....ALLA Dugan       12/14/18 8637   Post-Acute Status   Post-Acute Authorization Home Health/Hospice

## 2018-12-15 NOTE — PLAN OF CARE
12/15/18 0916   Final Note   Assessment Type Final Discharge Note   Anticipated Discharge Disposition Home

## 2018-12-18 ENCOUNTER — PATIENT MESSAGE (OUTPATIENT)
Dept: ADMINISTRATIVE | Facility: OTHER | Age: 63
End: 2018-12-18

## 2018-12-18 ENCOUNTER — DOCUMENTATION ONLY (OUTPATIENT)
Dept: FAMILY MEDICINE | Facility: CLINIC | Age: 63
End: 2018-12-18

## 2018-12-18 NOTE — PROGRESS NOTES
Pre-Visit Chart Review  For Appointment Scheduled on 12-28-18    There are no preventive care reminders to display for this patient.

## 2018-12-26 ENCOUNTER — OFFICE VISIT (OUTPATIENT)
Dept: ORTHOPEDICS | Facility: CLINIC | Age: 63
End: 2018-12-26
Payer: COMMERCIAL

## 2018-12-26 VITALS
HEART RATE: 88 BPM | BODY MASS INDEX: 25.9 KG/M2 | WEIGHT: 165 LBS | HEIGHT: 67 IN | SYSTOLIC BLOOD PRESSURE: 118 MMHG | DIASTOLIC BLOOD PRESSURE: 64 MMHG

## 2018-12-26 DIAGNOSIS — Z98.1 STATUS POST LUMBAR SPINAL FUSION: Primary | ICD-10-CM

## 2018-12-26 PROCEDURE — 72100 X-RAY EXAM L-S SPINE 2/3 VWS: CPT | Mod: ,,, | Performed by: ORTHOPAEDIC SURGERY

## 2018-12-26 PROCEDURE — 99024 POSTOP FOLLOW-UP VISIT: CPT | Mod: ,,, | Performed by: ORTHOPAEDIC SURGERY

## 2018-12-26 RX ORDER — ESOMEPRAZOLE MAGNESIUM 40 MG/1
CAPSULE, DELAYED RELEASE ORAL
COMMUNITY
Start: 2018-11-14 | End: 2018-12-26

## 2018-12-26 RX ORDER — OXYCODONE HYDROCHLORIDE 15 MG/1
15 TABLET ORAL EVERY 4 HOURS PRN
Qty: 42 TABLET | Refills: 0 | Status: SHIPPED | OUTPATIENT
Start: 2018-12-26 | End: 2019-01-02

## 2018-12-26 NOTE — PROGRESS NOTES
Subjective:    Patient ID: Mal Larkin is a 63 y.o. male.    Chief Complaint: Post-op Evaluation of the Lumbar Spine (/STAPLES// L1, L2, L3 Laminectomy, L4-5 Re-exploration, L1-2, L2-3, L3-4, L4-5, L5-S1 PLF(12.11.18) He is having severe pain and can not sleep. Pain does not radiate. All in lumbar he has throbbing pain)      History of Present Illness  Status post op. Patient is doing well his back pain and leg pains are improved. He had an extensive procedure. He states he can follow-up already tell the difference in his symptoms    Current Medications  Current Outpatient Medications   Medication Sig Dispense Refill    calcium-vitamin D3 (CALCIUM 500 + D) 500 mg(1,250mg) -200 unit per tablet Take 1 tablet by mouth Daily.      multivitamin (ONE DAILY MULTIVITAMIN) per tablet Take 1 tablet by mouth once daily.      naproxen sodium (ANAPROX) 220 MG tablet Take 220 mg by mouth every 12 (twelve) hours.       No current facility-administered medications for this visit.        Allergies  Review of patient's allergies indicates:  No Known Allergies    Past Medical History  Past Medical History:   Diagnosis Date    Adenomatous colon polyp     + dysplasia    Colon polyp     Fatty liver     Hypertension     Melanoma 2011    in situ - upper back       Surgical History  Past Surgical History:   Procedure Laterality Date    APPENDECTOMY      BACK SURGERY      transverse process fracture    BLOCK-NERVE-MEDIAL BRANCH-LUMBAR Bilateral 8/3/2017    Per[facility]    COLONOSCOPY  11/11/2013    Dr. Hayes, 3 year recheck    COLONOSCOPY N/A 12/20/2016    [facility]    COLONOSCOPY N/A 11/11/2013    [facility] at [facility] ENDO    COLONOSCOPY N/A 8/1/2013    [facility] at [facility] ENDO    FUSION, SPINE, LUMBAR, PLIF L1-2, L2-3, L3-4, L4-5, L5-S1 N/A 12/11/2018    Performed by Vik Cox [facility] at [facility] OR    GASTRIC  SLEEVE      HAND SURGERY      right dupuytrens release on 8/5/13    LAMINECTOMY, SPINE, LUMBAR L1, L2, L3 with  L4-L5 Re-exploration N/A 12/11/2018    [facility]    LUMBAR LAMINECTOMY  2011    melanoma removal  2011    RADIOFREQUENCY THERMOCOAGULATION (RFTC)-NERVE-MEDIAN BRANCH-LUMBAR Bilateral 8/22/2017    [facility]    RELEASE DUPUYTREN'S CONTRACTURE Right 8/5/2013    [facility] at [facility] OR    WISDOM TOOTH EXTRACTION         Family History:   Family History   Problem Relation Age of Onset    Skin cancer Mother     Ovarian cancer Mother     Heart disease Mother     Prostate cancer Father     Colon cancer Father     Cancer Father         glands    Melanoma Father     Melanoma Sister     Asthma Daughter     Fibromyalgia Daughter     Heart disease Maternal Uncle     Alcohol abuse Maternal Uncle     Cancer Paternal Aunt     Cerebral aneurysm Maternal Grandmother     Early death Maternal Grandmother         fall hit head    Heart disease Maternal Grandfather     Cancer Paternal Grandmother     Pancreatic cancer Paternal Grandmother     Cancer Sister         cervix, abd     Melanoma Sister     Skin cancer Sister     Vaginal cancer Sister     Macular degeneration Neg Hx     Retinal detachment Neg Hx     Glaucoma Neg Hx     Psoriasis Neg Hx     Lupus Neg Hx     Eczema Neg Hx        Social History:   Social History     Socioeconomic History    Marital status:      Spouse name: Not on file    Number of children: Not on file    Years of education: Not on file    Highest education level: Not on file   Social Needs    Financial resource strain: Not on file    Food insecurity - worry: Not on file    Food insecurity - inability: Not on file    Transportation needs - medical: Not on file    Transportation needs - non-medical: Not on file   Occupational History    Occupation:       Employer: [employer]   Tobacco Use    Smoking status: Former Smoker     Packs/day: 1.00     Years: 30.00     Pack years: 30.00     Types: Cigarettes     Last attempt to quit: 2014     Years since quittin.6    Smokeless tobacco: Former User     Types: Chew     Quit date: 1979   Substance and Sexual Activity    Alcohol use: Yes     Alcohol/week: 6.0 oz     Types: 12 Standard drinks or equivalent per week     Comment: Drinks beer only and mostly during football season    Drug use: No    Sexual activity: Not on file   Other Topics Concern    Not on file   Social History Narrative    Not on file       Date of surgery:     Review of Systems     General/Constitutional: Chills denies. Fatigue denies. Fever denies. Weight gain denies. Weight loss denies.    Musculoskeletal: Comments: See HPI for details    Skin: Rash denies.    Objective:   Vital Signs:   Vitals:    18 0815   BP: 118/64   Pulse: 88        Physical Exam    This a well-developed, well nourished patient in no acute distress.  They are alert and oriented and cooperative to examination.  Pt. walks without an antalgic gait.      General Examination:     Constitutional: The patient is alert and oriented to lace person and time. Mood is pleasant.     Head/Face: Normal facial features normal eyebrows    Eyes: Normal extraocular motion bilaterally    Lungs: Respirations are equal and unlabored    Gait is coordinated.    Cardiovascular: There are no swelling or varicosities present.    Lymphatic: Negative for adenopathy    Skin: Normal    Neurological: Level of consciousness normal. Oriented to place person and time and situation    Psychiatric: Oriented to time place person and situation    Lumbar incision well-healed no signs of infection. Staples were removed today.  XRAY Report/ Interpretation: AP and lateral x-rays of the lumbar spine will performed today. Indications postop lumbar spine surgery. Findings: Postoperative changes of  instrumented lumbar fusion L1-L4 to sacrum. No signs of hardware loosening. Overall alignment of hardware looks satisfactory      Assessment:       1. Status post lumbar spinal fusion        Plan:       Mal was seen today for post-op evaluation.    Diagnoses and all orders for this visit:    Status post lumbar spinal fusion    Other orders  -     X-Ray Lumbar Spine Ap And Lateral         No Follow-up on file.    Patient appears to be doing well secondary activities I advise at this time. Return in 6 weeks with x-rays lumbar spine      This note was created using Dragon voice recognition software that occasionally misinterpreted phrases or words.

## 2019-01-03 ENCOUNTER — PATIENT MESSAGE (OUTPATIENT)
Dept: FAMILY MEDICINE | Facility: CLINIC | Age: 64
End: 2019-01-03

## 2019-01-04 ENCOUNTER — DOCUMENTATION ONLY (OUTPATIENT)
Dept: FAMILY MEDICINE | Facility: CLINIC | Age: 64
End: 2019-01-04

## 2019-01-04 NOTE — PROGRESS NOTES
Pre-Visit Chart Review  For Appointment Scheduled on 1-8-19    Health Maintenance Due   Topic Date Due    TETANUS VACCINE  07/23/1973    Pneumococcal Vaccine (Medium Risk) (1 of 1 - PPSV23) 07/23/1974    Zoster Vaccine  07/23/2015

## 2019-01-08 ENCOUNTER — OFFICE VISIT (OUTPATIENT)
Dept: FAMILY MEDICINE | Facility: CLINIC | Age: 64
End: 2019-01-08
Attending: FAMILY MEDICINE
Payer: COMMERCIAL

## 2019-01-08 VITALS
SYSTOLIC BLOOD PRESSURE: 130 MMHG | WEIGHT: 180.56 LBS | HEIGHT: 67 IN | RESPIRATION RATE: 12 BRPM | DIASTOLIC BLOOD PRESSURE: 70 MMHG | HEART RATE: 69 BPM | BODY MASS INDEX: 28.34 KG/M2 | OXYGEN SATURATION: 98 % | TEMPERATURE: 98 F

## 2019-01-08 DIAGNOSIS — Z12.9 SCREENING FOR CANCER: ICD-10-CM

## 2019-01-08 DIAGNOSIS — Z98.84 S/P BARIATRIC SURGERY: ICD-10-CM

## 2019-01-08 DIAGNOSIS — Z85.820 PERSONAL HISTORY OF MALIGNANT MELANOMA OF SKIN: ICD-10-CM

## 2019-01-08 DIAGNOSIS — D12.6 ADENOMATOUS POLYP OF COLON, UNSPECIFIED PART OF COLON: ICD-10-CM

## 2019-01-08 DIAGNOSIS — Z98.890 STATUS POST LUMBAR SPINE SURGERY FOR DECOMPRESSION OF SPINAL CORD: Primary | ICD-10-CM

## 2019-01-08 DIAGNOSIS — Z23 IMMUNIZATION DUE: ICD-10-CM

## 2019-01-08 PROCEDURE — 99214 PR OFFICE/OUTPT VISIT, EST, LEVL IV, 30-39 MIN: ICD-10-PCS | Mod: 25,S$GLB,, | Performed by: FAMILY MEDICINE

## 2019-01-08 PROCEDURE — 3078F PR MOST RECENT DIASTOLIC BLOOD PRESSURE < 80 MM HG: ICD-10-PCS | Mod: CPTII,S$GLB,, | Performed by: FAMILY MEDICINE

## 2019-01-08 PROCEDURE — 90471 PNEUMOCOCCAL POLYSACCHARIDE VACCINE 23-VALENT =>2YO SQ IM: ICD-10-PCS | Mod: S$GLB,,, | Performed by: FAMILY MEDICINE

## 2019-01-08 PROCEDURE — 90732 PNEUMOCOCCAL POLYSACCHARIDE VACCINE 23-VALENT =>2YO SQ IM: ICD-10-PCS | Mod: S$GLB,,, | Performed by: FAMILY MEDICINE

## 2019-01-08 PROCEDURE — 99999 PR PBB SHADOW E&M-EST. PATIENT-LVL III: CPT | Mod: PBBFAC,,, | Performed by: FAMILY MEDICINE

## 2019-01-08 PROCEDURE — 3008F PR BODY MASS INDEX (BMI) DOCUMENTED: ICD-10-PCS | Mod: CPTII,S$GLB,, | Performed by: FAMILY MEDICINE

## 2019-01-08 PROCEDURE — 3008F BODY MASS INDEX DOCD: CPT | Mod: CPTII,S$GLB,, | Performed by: FAMILY MEDICINE

## 2019-01-08 PROCEDURE — 3075F SYST BP GE 130 - 139MM HG: CPT | Mod: CPTII,S$GLB,, | Performed by: FAMILY MEDICINE

## 2019-01-08 PROCEDURE — 90732 PPSV23 VACC 2 YRS+ SUBQ/IM: CPT | Mod: S$GLB,,, | Performed by: FAMILY MEDICINE

## 2019-01-08 PROCEDURE — 3075F PR MOST RECENT SYSTOLIC BLOOD PRESS GE 130-139MM HG: ICD-10-PCS | Mod: CPTII,S$GLB,, | Performed by: FAMILY MEDICINE

## 2019-01-08 PROCEDURE — 3078F DIAST BP <80 MM HG: CPT | Mod: CPTII,S$GLB,, | Performed by: FAMILY MEDICINE

## 2019-01-08 PROCEDURE — 99999 PR PBB SHADOW E&M-EST. PATIENT-LVL III: ICD-10-PCS | Mod: PBBFAC,,, | Performed by: FAMILY MEDICINE

## 2019-01-08 PROCEDURE — 90471 IMMUNIZATION ADMIN: CPT | Mod: S$GLB,,, | Performed by: FAMILY MEDICINE

## 2019-01-08 PROCEDURE — 99214 OFFICE O/P EST MOD 30 MIN: CPT | Mod: 25,S$GLB,, | Performed by: FAMILY MEDICINE

## 2019-01-08 RX ORDER — PHENYLPROPANOLAMINE/CLEMASTINE 75-1.34MG
2 TABLET, EXTENDED RELEASE ORAL EVERY 8 HOURS PRN
COMMUNITY
End: 2019-04-11

## 2019-01-08 NOTE — PROGRESS NOTES
Subjective:       Patient ID: Mal Larkin is a 63 y.o. male.    Chief Complaint: Follow-up (surgery )    63-year-old male coming in to follow-up from back surgery.  Dr. Cox did a lumbar laminectomy at the L5-S1 level with a spinal fusion as well.  This occurred on December 11, 2018.  The patient developed a impaction afterwards which he resolved finally with some magnesium citrate.  Since that time he has had no problems.  He is on no opioids at all at this point taking a small amount of Advil only.  He is status post a gastric sleeve procedure last year and is down over 100 lb with a current BMI of 28.  He is off of all blood pressure medications.  He does have a history of smoking having quit about 5 years ago and has never had a low-dose CT scan.  He has a history of a melanoma excision from his back followed by Dermatology.  In addition to the history of hypertension he has a history of adenomatous colon polyps with his colonoscopy due in December of this year with Dr. Hayes and has a history of non alcoholic fatty liver disease which I expect will be improved with his weight loss.  He is due for a Pneumovax and a zoster vaccine.  We discussed the Shingrix and he will seek that at his pharmacy.    Past Medical History:  No date: Adenomatous colon polyp      Comment:  + dysplasia  No date: Colon polyp  No date: Fatty liver  No date: Hypertension  2011: Melanoma      Comment:  in situ - upper back    Past Surgical History:  No date: APPENDECTOMY  No date: BACK SURGERY      Comment:  transverse process fracture  8/3/2017: BLOCK-NERVE-MEDIAL BRANCH-LUMBAR; Bilateral      Comment:  Performed by [facility] OR  11/11/2013: COLONOSCOPY      Comment:  Dr. Hayes, 3 year recheck  12/20/2016: COLONOSCOPY; N/A      Comment:  Performed by [facility] at [facility] ENDO  11/11/2013: COLONOSCOPY; N/A      Comment:  Performed by Atilio Hayes MD at [facility] ENDO  8/1/2013: COLONOSCOPY; N/A       Comment:  Performed by Atilio Hayes MD at [facility] ENDO  12/11/2018: FUSION, SPINE, LUMBAR, PLIF L1-2, L2-3, L3-4, L4-5, L5-  S1; N/A      Comment:  Performed by Vik Cox MD at [facility] OR  No date: GASTRIC SLEEVE  No date: HAND SURGERY      Comment:  right dupuytrens release on 8/5/13 12/11/2018: LAMINECTOMY, SPINE, LUMBAR L1, L2, L3 with  L4-L5 Re-  exploration; N/A      Comment:  Performed by Vki Cox MD at [facility] OR  2011: LUMBAR LAMINECTOMY  2011: melanoma removal  8/22/2017: RADIOFREQUENCY THERMOCOAGULATION (RFTC)-NERVE-MEDIAN   BRANCH-LUMBAR; Bilateral      Comment:  Performed by [facility] at [facility] OR  8/5/2013: RELEASE DUPUYTREN'S CONTRACTURE; Right      Comment:  Performed by Jamel Chinchilla MD at [facility] OR  12/11/2018: SPINE SURGERY      Comment:  Dr Cox L1 - S1. lamanectomy/fusion  No date: WISDOM TOOTH EXTRACTION    Current Outpatient Medications on File Prior to Visit:  calcium-vitamin D3 (CALCIUM 500 + D) 500 mg(1,250mg) -200 unit per tablet, Take 1 tablet by mouth Daily., Disp: , Rfl:   ibuprofen 200 mg Cap, Take 2 capsules by mouth every 8 (eight) hours as needed., Disp: , Rfl:   multivitamin (ONE DAILY MULTIVITAMIN) per tablet, Take 1 tablet by mouth once daily., Disp: , Rfl:   (DISCONTINUED) naproxen sodium (ANAPROX) 220 MG tablet, Take 220 mg by mouth every 12 (twelve) hours., Disp: , Rfl:     No current facility-administered medications on file prior to visit.     TETANUS VACCINE due on 07/23/1973  Pneumococcal Vaccine (Medium Risk)(1 of 1 - PPSV23) due on 07/23/1974  LDCT Lung Screen due on 07/23/2010  Zoster Vaccine due on 07/23/2015          Review of Systems   Constitutional: Negative for activity change and unexpected weight change.   HENT: Negative for hearing loss, rhinorrhea and trouble swallowing.    Eyes: Negative for discharge and visual disturbance.   Respiratory: Negative for chest tightness and wheezing.    Cardiovascular: Negative for chest pain and  palpitations.   Gastrointestinal: Negative for blood in stool, constipation, diarrhea and vomiting.   Endocrine: Negative for polydipsia and polyuria.   Genitourinary: Negative for difficulty urinating, hematuria and urgency.   Musculoskeletal: Negative for arthralgias, joint swelling and neck pain.   Neurological: Negative for weakness and headaches.   Psychiatric/Behavioral: Negative for confusion and dysphoric mood.       Objective:      Physical Exam   Constitutional: He is oriented to person, place, and time. He appears well-developed. No distress.   Good blood pressure control  Overweight with a BMI of 28.3 he is down 107 lb from his April 10, 2018 visit and that includes the weight of a back brace   HENT:   Head: Normocephalic and atraumatic.   Right Ear: External ear normal.   Left Ear: External ear normal.   Nose: Nose normal.   Mouth/Throat: Oropharynx is clear and moist. No oropharyngeal exudate.   Eyes: Conjunctivae and EOM are normal. Pupils are equal, round, and reactive to light. No scleral icterus.   Neck: Normal range of motion. Neck supple. No JVD present. No tracheal deviation present. No thyromegaly present.   Cardiovascular: Normal rate, regular rhythm and normal heart sounds. Exam reveals no gallop and no friction rub.   No murmur heard.  Pulmonary/Chest: Effort normal and breath sounds normal. No respiratory distress. He has no wheezes. He has no rales. He exhibits no tenderness.   Abdominal: Soft. Bowel sounds are normal. He exhibits no distension and no mass. There is no tenderness. There is no rebound and no guarding.   Musculoskeletal: Normal range of motion. He exhibits no edema.   Lymphadenopathy:     He has no cervical adenopathy.   Neurological: He is alert and oriented to person, place, and time. He has normal reflexes.   Skin: Skin is warm and dry. No rash noted. He is not diaphoretic. No erythema. No pallor.   Psychiatric: He has a normal mood and affect. His behavior is normal.  Judgment and thought content normal.   Nursing note and vitals reviewed.      Assessment:       1. Status post lumbar spine surgery for decompression of spinal cord    2. S/P bariatric surgery    3. Immunization due    4. Screening for cancer    5. Adenomatous polyp of colon, unspecified part of colon    6. Personal history of malignant melanoma of skin        Plan:       1. Status post lumbar spine surgery for decompression of spinal cord  Doing very well, on no opioids less than 1 month postop    2. S/P bariatric surgery  Has lost more than 100 lb followed by Dr. Sneed    3. Immunization due  Pneumovax given, shingles vaccine discussed  - (In Office Administered) Pneumococcal Polysaccharide Vaccine (23 Valent) (SQ/IM)    4. Screening for cancer  Do for baseline low-dose CT lung scan, will be due for colonoscopy in December 2019  - CT Chest Lung Screening Low Dose; Future    5. Adenomatous polyp of colon, unspecified part of colon  See above    6. Personal history of malignant melanoma of skin  Followed by Dermatology

## 2019-01-09 ENCOUNTER — HOSPITAL ENCOUNTER (OUTPATIENT)
Dept: RADIOLOGY | Facility: HOSPITAL | Age: 64
Discharge: HOME | End: 2019-01-09
Attending: FAMILY MEDICINE
Payer: COMMERCIAL

## 2019-01-09 DIAGNOSIS — Z12.9 SCREENING FOR CANCER: ICD-10-CM

## 2019-01-09 PROCEDURE — G0297 LDCT FOR LUNG CA SCREEN: HCPCS | Mod: 26,,, | Performed by: RADIOLOGY

## 2019-01-09 PROCEDURE — G0297 CT CHEST LUNG SCREENING LOW DOSE: ICD-10-PCS | Mod: 26,,, | Performed by: RADIOLOGY

## 2019-01-09 PROCEDURE — G0297 LDCT FOR LUNG CA SCREEN: HCPCS | Mod: TC

## 2019-01-17 ENCOUNTER — PATIENT OUTREACH (OUTPATIENT)
Dept: OTHER | Facility: OTHER | Age: 64
End: 2019-01-17

## 2019-01-17 NOTE — PROGRESS NOTES
Last 5 Patient Entered Readings                                      Current 30 Day Average: 125/74     Recent Readings 1/12/2019 1/4/2019 1/4/2019 1/4/2019 12/27/2018    SBP (mmHg) 121 128 136 144 120    DBP (mmHg) 75 74 73 79 71    Pulse 79 68 71 73 68        Patient's BP average is controlled based on 2017 ACC/AHA HTN guidelines of goal BP <130/80.      Mr. Larkin had back surgery and has recovered well. He is back to the gym 2 hours each day and rides 12-15 miles on his bike most days depending on weather. He is down to 168 lbs as of this morning. He is feeling well and happy with current health status.     I will continue to monitor regularly and will follow up in 4-6 months, sooner if BP begins to trend upward or downward.    Patient has my contact information and knows to call with any concerns or clinical changes.     Current HTN regimen:  None

## 2019-01-30 ENCOUNTER — PATIENT MESSAGE (OUTPATIENT)
Dept: ADMINISTRATIVE | Facility: OTHER | Age: 64
End: 2019-01-30

## 2019-01-30 NOTE — PROGRESS NOTES
Last 5 Patient Entered Readings                                      Current 30 Day Average: 126/74     Recent Readings 1/29/2019 1/20/2019 1/20/2019 1/12/2019 1/4/2019    SBP (mmHg) 127 128 142 121 128    DBP (mmHg) 67 73 80 75 74    Pulse 66 65 67 79 68        Digital Medicine: Health  Follow Up    Lifestyle Modifications:    1.Dietary Modifications (Sodium intake <2,000mg/day, food labels, dining out): Patient reports he still watching his sodium intake.     2.Physical Activity: Patient reports he has been back in the gym since December 28 after having back surgery on 12/11. Patient states he is back to his exercise routine.     3.Medication Therapy: Patient has been compliant with the medication regimen.    4.Patient has the following medication side effects/concerns: Patient denies any s/s of hypotension (dizziness, LH,nausea, or fatigue) with low readings. Patient denies any s/s of hypertension (CP,SOB,severe headaches, or change vision)w with high readings.   (Frequency/Alleviating factors/Precipitating factors, etc.)     Follow up with Mr. Mal Larkin completed. No further questions or concerns.     Plan: Will continue to follow up to achieve health goals.

## 2019-02-04 ENCOUNTER — OFFICE VISIT (OUTPATIENT)
Dept: ORTHOPEDICS | Facility: CLINIC | Age: 64
End: 2019-02-04
Payer: COMMERCIAL

## 2019-02-04 VITALS
SYSTOLIC BLOOD PRESSURE: 118 MMHG | HEIGHT: 67 IN | DIASTOLIC BLOOD PRESSURE: 70 MMHG | HEART RATE: 72 BPM | BODY MASS INDEX: 26.68 KG/M2 | WEIGHT: 170 LBS

## 2019-02-04 DIAGNOSIS — Z98.1 STATUS POST LUMBAR SPINAL FUSION: Primary | ICD-10-CM

## 2019-02-04 PROCEDURE — 99024 PR POST-OP FOLLOW-UP VISIT: ICD-10-PCS | Mod: ,,, | Performed by: ORTHOPAEDIC SURGERY

## 2019-02-04 PROCEDURE — 72100 PR  X-RAY LUMBAR SPINE 2/3 VW: ICD-10-PCS | Mod: ,,, | Performed by: ORTHOPAEDIC SURGERY

## 2019-02-04 PROCEDURE — 72100 X-RAY EXAM L-S SPINE 2/3 VWS: CPT | Mod: ,,, | Performed by: ORTHOPAEDIC SURGERY

## 2019-02-04 PROCEDURE — 99024 POSTOP FOLLOW-UP VISIT: CPT | Mod: ,,, | Performed by: ORTHOPAEDIC SURGERY

## 2019-02-04 RX ORDER — HYDROMORPHONE HYDROCHLORIDE 2 MG/1
2 TABLET ORAL EVERY 6 HOURS PRN
Qty: 28 TABLET | Refills: 0 | Status: SHIPPED | OUTPATIENT
Start: 2019-02-04 | End: 2019-02-11

## 2019-02-04 RX ORDER — HYDROMORPHONE HYDROCHLORIDE 4 MG/1
4 TABLET ORAL EVERY 4 HOURS PRN
COMMUNITY
End: 2019-02-04 | Stop reason: SDUPTHER

## 2019-02-04 NOTE — PROGRESS NOTES
Subjective:    Patient ID: Mal Larkin is a 63 y.o. male.    Chief Complaint: Post-op Evaluation of the Lumbar Spine ( 6w follow up from 12/26/18, Lumbar fusion 12/11/18. He has no pain but numbness in left thigh and right toes. No other treatment)      History of Present Illness  Patient is here for postoperative appointment almost 2 months status post L1, L2, L3 Laminectomy, L4-5 Re-exploration, L1-2, L2-3, L3-4, L4-5, L5-S1 PLF(12.11.18). Overall he is doing exceptionally well with some residual postoperative low back pain. And almost complete resolution of lower extremity radicular symptoms.      Current Medications  Current Outpatient Medications   Medication Sig Dispense Refill    calcium-vitamin D3 (CALCIUM 500 + D) 500 mg(1,250mg) -200 unit per tablet Take 1 tablet by mouth Daily.      HYDROmorphone (DILAUDID) 4 MG tablet Take 4 mg by mouth every 4 (four) hours as needed for Pain.      ibuprofen 200 mg Cap Take 2 capsules by mouth every 8 (eight) hours as needed.      multivitamin (ONE DAILY MULTIVITAMIN) per tablet Take 1 tablet by mouth once daily.       No current facility-administered medications for this visit.        Allergies  Review of patient's allergies indicates:  No Known Allergies    Past Medical History  Past Medical History:   Diagnosis Date    Adenomatous colon polyp     + dysplasia    Colon polyp     Fatty liver     Hypertension     Melanoma 2011    in situ - upper back       Surgical History  Past Surgical History:   Procedure Laterality Date    APPENDECTOMY      BACK SURGERY      transverse process fracture    BLOCK-NERVE-MEDIAL BRANCH-LUMBAR Bilateral 8/3/2017    [facility]    COLONOSCOPY  11/11/2013    Dr. Hayes, 3 year recheck    COLONOSCOPY N/A 12/20/2016    [facility]    COLONOSCOPY N/A 11/11/2013    [facility]    COLONOSCOPY N/A 8/1/2013    Performed by Atilio  DAREK Hayes MD at [facility] ENDO    FUSION, SPINE, LUMBAR, PLIF L1-2, L2-3, L3-4, L4-5, L5-S1 N/A 12/11/2018    Performed by Vik Cox [facility] at [facility] OR    GASTRIC SLEEVE      HAND SURGERY      right dupuytrens release on 8/5/13    LAMINECTOMY, SPINE, LUMBAR L1, L2, L3 with  L4-L5 Re-exploration N/A 12/11/2018    Performed by Vik Cox [facility] at [facility] OR    LUMBAR LAMINECTOMY  2011    melanoma removal  2011    RADIOFREQUENCY THERMOCOAGULATION (RFTC)-NERVE-MEDIAN BRANCH-LUMBAR Bilateral 8/22/2017    [facility]    RELEASE DUPUYTREN'S CONTRACTURE Right 8/5/2013    Performed by Jamel Chinchilla [facility] at [facility] OR    SPINE SURGERY  12/11/2018    Dr Cox L1 - S1. lamanectomy/fusion    WISDOM TOOTH EXTRACTION         Family History:   Family History   Problem Relation Age of Onset    Skin cancer Mother     Ovarian cancer Mother     Heart disease Mother     Prostate cancer Father     Colon cancer Father     Cancer Father         glands    Melanoma Father     Melanoma Sister     Asthma Daughter     Fibromyalgia Daughter     Heart disease Maternal Uncle     Alcohol abuse Maternal Uncle     Cancer Paternal Aunt     Cerebral aneurysm Maternal Grandmother     Early death Maternal Grandmother         fall hit head    Heart disease Maternal Grandfather     Cancer Paternal Grandmother     Pancreatic cancer Paternal Grandmother     Cancer Sister         cervix, abd     Melanoma Sister     Skin cancer Sister     Vaginal cancer Sister     Macular degeneration Neg Hx     Retinal detachment Neg Hx     Glaucoma Neg Hx     Psoriasis Neg Hx     Lupus Neg Hx     Eczema Neg Hx        Social History:   Social History     Socioeconomic History    Marital status:      Spouse name: Not on file    Number of children: Not on file    Years of education: Not on file    Highest education level: Not on file   Social Needs    Financial resource strain: Not on file    Food  insecurity - worry: Not on file    Food insecurity - inability: Not on file    Transportation needs - medical: Not on file    Transportation needs - non-medical: Not on file   Occupational History    Occupation:      Employer: [employer]   Tobacco Use    Smoking status: Former Smoker     Packs/day: 1.00     Years: 30.00     Pack years: 30.00     Types: Cigarettes     Last attempt to quit: 2014     Years since quittin.7    Smokeless tobacco: Former User     Types: Chew     Quit date: 1979   Substance and Sexual Activity    Alcohol use: Yes     Alcohol/week: 6.0 oz     Types: 12 Standard drinks or equivalent per week     Comment: Drinks beer only and mostly during football season    Drug use: No    Sexual activity: Not on file   Other Topics Concern    Not on file   Social History Narrative    Not on file       Date of surgery: 2018    Review of Systems     General/Constitutional: Chills denies. Fatigue denies. Fever denies. Weight gain denies. Weight loss denies.    Musculoskeletal: Comments: See HPI for details    Skin: Rash denies.    Objective:   Vital Signs:   Vitals:    19 0806   BP: 118/70   Pulse: 72        Physical Exam    This a well-developed, well nourished patient in no acute distress.  They are alert and oriented and cooperative to examination.  Pt. walks without an antalgic gait.      General Examination:     Constitutional: The patient is alert and oriented to lace person and time. Mood is pleasant.     Head/Face: Normal facial features normal eyebrows    Eyes: Normal extraocular motion bilaterally    Lungs: Respirations are equal and unlabored    Gait is coordinated.    Cardiovascular: There are no swelling or varicosities present.    Lymphatic: Negative for adenopathy    Skin: Normal    Neurological: Level of consciousness normal. Oriented to place person and time and situation    Psychiatric: Oriented to time place person and situation    Lumbar  "exam: Nonantalgic gait. Posterior lumbar incision remains well-healed. Lumbar range of motion remains significantly reduced approximately 50%. Bilateral lower extremities demonstrate full active range of motion, equal and symmetric DTRs, and normal strength except for some residual weakness of the left anterior tibialis and EHL 4/5. Negative straight leg raise maneuver    XRAY Report/ Interpretation: Postoperative x-rays taken in the office today reviewed the patient demonstrate a normal postoperative appearance with posterior instrumentation from L1 to the sacrum      Assessment:       1. Status post lumbar spinal fusion        Plan:       Mal was seen today for post-op evaluation.    Diagnoses and all orders for this visit:    Status post lumbar spinal fusion  -     X-Ray Lumbar Spine Ap And Lateral         No Follow-up on file.  Oz Grimaldo, physician's assistant served in the capacity as a "scribe" for this patient encounter  A "face to face" encounter occurred with Dr. Cox on this date  The treatment plan and medical decision making is outlined below:    Continue with activity as tolerated. Follow-up in 3 months or sooner if needed.      This note was created using Dragon voice recognition software that occasionally misinterpreted phrases or words.               "

## 2019-03-28 ENCOUNTER — PATIENT MESSAGE (OUTPATIENT)
Dept: OTHER | Facility: OTHER | Age: 64
End: 2019-03-28

## 2019-04-09 ENCOUNTER — DOCUMENTATION ONLY (OUTPATIENT)
Dept: FAMILY MEDICINE | Facility: CLINIC | Age: 64
End: 2019-04-09

## 2019-04-09 NOTE — PROGRESS NOTES
Pre-Visit Chart Review  For Appointment Scheduled on 4-11-19    Health Maintenance Due   Topic Date Due    TETANUS VACCINE  07/23/1973    Zoster Vaccine  07/23/2015

## 2019-04-11 ENCOUNTER — OFFICE VISIT (OUTPATIENT)
Dept: FAMILY MEDICINE | Facility: CLINIC | Age: 64
End: 2019-04-11
Attending: FAMILY MEDICINE
Payer: COMMERCIAL

## 2019-04-11 VITALS
DIASTOLIC BLOOD PRESSURE: 80 MMHG | WEIGHT: 173.75 LBS | BODY MASS INDEX: 27.27 KG/M2 | HEIGHT: 67 IN | RESPIRATION RATE: 16 BRPM | HEART RATE: 62 BPM | TEMPERATURE: 98 F | OXYGEN SATURATION: 98 % | SYSTOLIC BLOOD PRESSURE: 126 MMHG

## 2019-04-11 DIAGNOSIS — M77.02 MEDIAL EPICONDYLITIS, LEFT ELBOW: Primary | ICD-10-CM

## 2019-04-11 PROCEDURE — 99999 PR PBB SHADOW E&M-EST. PATIENT-LVL III: CPT | Mod: PBBFAC,,, | Performed by: FAMILY MEDICINE

## 2019-04-11 PROCEDURE — 3079F PR MOST RECENT DIASTOLIC BLOOD PRESSURE 80-89 MM HG: ICD-10-PCS | Mod: CPTII,S$GLB,, | Performed by: FAMILY MEDICINE

## 2019-04-11 PROCEDURE — 99213 PR OFFICE/OUTPT VISIT, EST, LEVL III, 20-29 MIN: ICD-10-PCS | Mod: S$GLB,,, | Performed by: FAMILY MEDICINE

## 2019-04-11 PROCEDURE — 3074F PR MOST RECENT SYSTOLIC BLOOD PRESSURE < 130 MM HG: ICD-10-PCS | Mod: CPTII,S$GLB,, | Performed by: FAMILY MEDICINE

## 2019-04-11 PROCEDURE — 3008F BODY MASS INDEX DOCD: CPT | Mod: CPTII,S$GLB,, | Performed by: FAMILY MEDICINE

## 2019-04-11 PROCEDURE — 99999 PR PBB SHADOW E&M-EST. PATIENT-LVL III: ICD-10-PCS | Mod: PBBFAC,,, | Performed by: FAMILY MEDICINE

## 2019-04-11 PROCEDURE — 3074F SYST BP LT 130 MM HG: CPT | Mod: CPTII,S$GLB,, | Performed by: FAMILY MEDICINE

## 2019-04-11 PROCEDURE — 99213 OFFICE O/P EST LOW 20 MIN: CPT | Mod: S$GLB,,, | Performed by: FAMILY MEDICINE

## 2019-04-11 PROCEDURE — 3008F PR BODY MASS INDEX (BMI) DOCUMENTED: ICD-10-PCS | Mod: CPTII,S$GLB,, | Performed by: FAMILY MEDICINE

## 2019-04-11 PROCEDURE — 3079F DIAST BP 80-89 MM HG: CPT | Mod: CPTII,S$GLB,, | Performed by: FAMILY MEDICINE

## 2019-04-11 NOTE — PROGRESS NOTES
Subjective:       Patient ID: Mal Larkin is a 63 y.o. male.    Chief Complaint: Joint Swelling (left elbow pain tendonitis)    63-year-old male comes in with several weeks history of pain in the medial epicondyle of the left elbow.  He has been working out at the gym and has started to play golf again after some time with decreased activity.  A number of his weight machines at the gym will aggravate the pain. He has a history of Dupuytren's contracture in both hands and has had surgery on the right side but currently is having no difficulties with use of the hands but does have some tendon swelling and contractures.    Past Medical History:  No date: Adenomatous colon polyp      Comment:  + dysplasia  No date: Colon polyp  No date: Fatty liver  No date: Hypertension  2011: Melanoma      Comment:  in situ - upper back    Past Surgical History:  No date: APPENDECTOMY  No date: BACK SURGERY      Comment:  transverse process fracture  8/3/2017: BLOCK-NERVE-MEDIAL BRANCH-LUMBAR; Bilateral      Comment:  Performed by [facility] at [facility] OR  11/11/2013: COLONOSCOPY      Comment:  Dr. Hayes, 3 year recheck  12/20/2016: COLONOSCOPY; N/A      Comment:  Performed by Atilio Hayes MD at [facility] ENDO  11/11/2013: COLONOSCOPY; N/A      Comment:  Performed by Atilio Hayes MD at [facility] ENDO  8/1/2013: COLONOSCOPY; N/A      Comment:  Performed by Atilio Hayes MD at [facility] ENDO  12/11/2018: FUSION, SPINE, LUMBAR, PLIF L1-2, L2-3, L3-4, L4-5, L5-  S1; N/A      Comment:  Performed by Vik Cox MD at [facility] OR  No date: GASTRIC SLEEVE  No date: HAND SURGERY      Comment:  right dupuytrens release on 8/5/13 12/11/2018: LAMINECTOMY, SPINE, LUMBAR L1, L2, L3 with  L4-L5 Re-  exploration; N/A      Comment:  Performed by Vik Cox MD at [facility] OR  2011: LUMBAR LAMINECTOMY  2011: melanoma removal  8/22/2017: RADIOFREQUENCY THERMOCOAGULATION (RFTC)-NERVE-MEDIAN   BRANCH-LUMBAR; Bilateral      Comment:  Performed  by [facility] OR  8/5/2013: RELEASE DUPUYTREN'S CONTRACTURE; Right      Comment:  Performed by [facility]illa [facility] at [facility] OR  12/11/2018: SPINE SURGERY      Comment:  Dr Cox L1 - S1. lamanectomy/fusion  No date: WISDOM TOOTH EXTRACTION    Current Outpatient Medications on File Prior to Visit:  calcium-vitamin D3 (CALCIUM 500 + D) 500 mg(1,250mg) -200 unit per tablet, Take 1 tablet by mouth Daily., Disp: , Rfl:   COQ10, LIPOSOMAL UBIQUINOL, ORAL, Take 1 capsule by mouth once daily., Disp: , Rfl:   KRILL OIL ORAL, Take 1 capsule by mouth once daily., Disp: , Rfl:   multivitamin (ONE DAILY MULTIVITAMIN) per tablet, Take 1 tablet by mouth once daily., Disp: , Rfl:   (DISCONTINUED) ibuprofen 200 mg Cap, Take 2 capsules by mouth every 8 (eight) hours as needed., Disp: , Rfl:     No current facility-administered medications on file prior to visit.         Review of Systems   Constitutional: Negative for activity change and unexpected weight change.   HENT: Negative for hearing loss, rhinorrhea and trouble swallowing.    Eyes: Negative for discharge and visual disturbance.   Respiratory: Negative for chest tightness and wheezing.    Cardiovascular: Negative for chest pain and palpitations.   Gastrointestinal: Negative for blood in stool, constipation, diarrhea and vomiting.   Endocrine: Negative for polydipsia and polyuria.   Genitourinary: Negative for difficulty urinating, hematuria and urgency.   Musculoskeletal: Negative for arthralgias, joint swelling and neck pain.   Neurological: Negative for weakness and headaches.   Psychiatric/Behavioral: Negative for confusion and dysphoric mood.       Objective:      Physical Exam   Constitutional: He appears well-developed. No distress.   Good blood pressure control  Overweight with a BMI of 27.2 he has dropped another 6.9 lb since January 8, 2019 following his gastric sleeve   Musculoskeletal:        Left elbow: He exhibits normal range of motion, no  swelling, no effusion and no deformity. Tenderness found. Medial epicondyle tenderness noted. No lateral epicondyle tenderness noted.   Skin: He is not diaphoretic.   Nursing note and vitals reviewed.      Assessment:       1. Medial epicondylitis, left elbow        Plan:       1. Medial epicondylitis, left elbow  forearm support strap, ice applications, rest, avoid weight machines that produce pain, other activities that do not give him any problems may be continued.  Discussed possibility of injection

## 2019-04-12 ENCOUNTER — TELEPHONE (OUTPATIENT)
Dept: ORTHOPEDICS | Facility: CLINIC | Age: 64
End: 2019-04-12

## 2019-04-12 ENCOUNTER — OFFICE VISIT (OUTPATIENT)
Dept: DERMATOLOGY | Facility: CLINIC | Age: 64
End: 2019-04-12
Payer: COMMERCIAL

## 2019-04-12 VITALS — WEIGHT: 173.75 LBS | HEIGHT: 67 IN | BODY MASS INDEX: 27.27 KG/M2

## 2019-04-12 DIAGNOSIS — L81.4 SOLAR LENTIGO: ICD-10-CM

## 2019-04-12 DIAGNOSIS — D22.9 MULTIPLE BENIGN NEVI: ICD-10-CM

## 2019-04-12 DIAGNOSIS — L73.8 SEBACEOUS HYPERPLASIA OF FACE: ICD-10-CM

## 2019-04-12 DIAGNOSIS — Z85.820 HISTORY OF MELANOMA: Primary | ICD-10-CM

## 2019-04-12 DIAGNOSIS — L82.1 SEBORRHEIC KERATOSES: ICD-10-CM

## 2019-04-12 PROCEDURE — 99213 PR OFFICE/OUTPT VISIT, EST, LEVL III, 20-29 MIN: ICD-10-PCS | Mod: S$GLB,,, | Performed by: DERMATOLOGY

## 2019-04-12 PROCEDURE — 3008F BODY MASS INDEX DOCD: CPT | Mod: CPTII,S$GLB,, | Performed by: DERMATOLOGY

## 2019-04-12 PROCEDURE — 99213 OFFICE O/P EST LOW 20 MIN: CPT | Mod: S$GLB,,, | Performed by: DERMATOLOGY

## 2019-04-12 PROCEDURE — 3008F PR BODY MASS INDEX (BMI) DOCUMENTED: ICD-10-PCS | Mod: CPTII,S$GLB,, | Performed by: DERMATOLOGY

## 2019-04-12 PROCEDURE — 99999 PR PBB SHADOW E&M-EST. PATIENT-LVL III: ICD-10-PCS | Mod: PBBFAC,,, | Performed by: DERMATOLOGY

## 2019-04-12 PROCEDURE — 99999 PR PBB SHADOW E&M-EST. PATIENT-LVL III: CPT | Mod: PBBFAC,,, | Performed by: DERMATOLOGY

## 2019-04-12 NOTE — TELEPHONE ENCOUNTER
----- Message from Tanja Bertrand sent at 4/12/2019  3:01 PM CDT -----  Contact: PT Portal Request  Appointment Request From: Mal Larkin    With Provider: Jamel Sandhu    Preferred Date Range: 4/8/2019 - 4/30/2019    Preferred Times: Any time    Reason for visit: Existing Patient    Comments:  Tendonitis in my left elbow.

## 2019-04-12 NOTE — PROGRESS NOTES
Subjective:       Patient ID:  Mal Larkin is a 63 y.o. male who presents for   Chief Complaint   Patient presents with    Skin Check     TBSE    Spot     L shoulder-Darkened, hard     Patient last seen 04/2018   H/o AKs treated with cryo  H/o MIS upper back 2011  + FH melanoma in father and both sisters  This is a high risk patient here to check for the development of new lesions.    Rides bike 15 miles per day, uses sun protection          Spot  - Initial  Affected locations: L shoulder.  Duration: 1 year  Signs and Symptoms: Darkened, antwan.  Severity: mild  Timing: constant  Aggravated by: nothing  Relieving factors/Treatments tried: nothing        Review of Systems   Constitutional: Positive for weight loss (bariatric surgery 5/2018 lost 135lbs). Negative for fever, chills, fatigue, night sweats and malaise.   HENT: Negative for headaches.    Respiratory: Negative for cough and shortness of breath.    Gastrointestinal: Negative for indigestion.   Skin: Positive for activity-related sunscreen use and wears hat. Negative for daily sunscreen use and recent sunburn.   Neurological: Negative for headaches.   Hematologic/Lymphatic: Negative for adenopathy. Bruises/bleeds easily.        Objective:    Physical Exam   Constitutional: He appears well-developed and well-nourished. He is obese.  No distress.   Neurological: He is alert and oriented to person, place, and time. He is not disoriented.   Psychiatric: He has a normal mood and affect.   Skin:   Areas Examined (abnormalities noted in diagram):   Scalp / Hair Palpated and Inspected  Head / Face Inspection Performed  Neck Inspection Performed  Chest / Axilla Inspection Performed  Abdomen Inspection Performed  Genitals / Buttocks / Groin Inspection Performed  Back Inspection Performed  RUE Inspected  LUE Inspection Performed  RLE Inspected  LLE Inspection Performed  Nails and Digits Inspection Performed                       Diagram Legend     Erythematous  scaling macule/papule c/w actinic keratosis       Vascular papule c/w angioma      Pigmented verrucoid papule/plaque c/w seborrheic keratosis      Yellow umbilicated papule c/w sebaceous hyperplasia      Irregularly shaped tan macule c/w lentigo     1-2 mm smooth white papules consistent with Milia      Movable subcutaneous cyst with punctum c/w epidermal inclusion cyst      Subcutaneous movable cyst c/w pilar cyst      Firm pink to brown papule c/w dermatofibroma      Pedunculated fleshy papule(s) c/w skin tag(s)      Evenly pigmented macule c/w junctional nevus     Mildly variegated pigmented, slightly irregular-bordered macule c/w mildly atypical nevus      Flesh colored to evenly pigmented papule c/w intradermal nevus       Pink pearly papule/plaque c/w basal cell carcinoma      Erythematous hyperkeratotic cursted plaque c/w SCC      Surgical scar with no sign of skin cancer recurrence      Open and closed comedones      Inflammatory papules and pustules      Verrucoid papule consistent consistent with wart     Erythematous eczematous patches and plaques     Dystrophic onycholytic nail with subungual debris c/w onychomycosis     Umbilicated papule    Erythematous-base heme-crusted tan verrucoid plaque consistent with inflamed seborrheic keratosis     Erythematous Silvery Scaling Plaque c/w Psoriasis     See annotation      Assessment / Plan:        History of melanoma  Area of previous melanoma examined. Site well healed with no signs of recurrence.    Total body skin examination performed today including at least 12 points as noted in physical examination. No lesions suspicious for malignancy noted.    Seborrheic keratoses  These are benign inherited growths without a malignant potential. Reassurance given to patient. No treatment is necessary.     Multiple benign nevi  Discussed ABCDE's of nevi.  Monitor for new mole or moles that are becoming bigger, darker, irritated, or developing irregular borders.  Brochure provided.    Solar lentigo  This is a benign hyperpigmented sun induced lesion. Daily sun protection will reduce the number of new lesions. Treatment of these benign lesions are considered cosmetic.    Sebaceous hyperplasia of face  This is a common condition representing benign enlargement of the sebaceous lobule. It typically occurs in adulthood. Reassurance given to patient.     Patient instructed in importance in daily sun protection of at least spf 30. Mineral sunscreen ingredients preferred (Zinc +/- Titanium).   Recommend Elta MD for daily use on face and neck.  Patient encouraged to wear hat for all outdoor exposure.   Also discussed sun avoidance and use of protective clothing.           Follow up in about 1 year (around 4/12/2020).

## 2019-04-22 ENCOUNTER — LAB VISIT (OUTPATIENT)
Dept: LAB | Facility: HOSPITAL | Age: 64
End: 2019-04-22
Attending: SURGERY
Payer: COMMERCIAL

## 2019-04-22 DIAGNOSIS — Z13.220 SCREENING FOR LIPOID DISORDERS: ICD-10-CM

## 2019-04-22 DIAGNOSIS — R53.83 FATIGUE: Primary | ICD-10-CM

## 2019-04-22 DIAGNOSIS — Z79.899 ENCOUNTER FOR LONG-TERM (CURRENT) USE OF MEDICATIONS: ICD-10-CM

## 2019-04-22 DIAGNOSIS — Z13.21 SCREENING FOR MALNUTRITION: ICD-10-CM

## 2019-04-22 LAB
25(OH)D3+25(OH)D2 SERPL-MCNC: 60 NG/ML (ref 30–96)
ALBUMIN SERPL BCP-MCNC: 3.9 G/DL (ref 3.5–5.2)
ALP SERPL-CCNC: 87 U/L (ref 55–135)
ALT SERPL W/O P-5'-P-CCNC: 18 U/L (ref 10–44)
ANION GAP SERPL CALC-SCNC: 10 MMOL/L (ref 8–16)
AST SERPL-CCNC: 31 U/L (ref 10–40)
BASOPHILS # BLD AUTO: 0.05 K/UL (ref 0–0.2)
BASOPHILS NFR BLD: 1.1 % (ref 0–1.9)
BILIRUB SERPL-MCNC: 0.5 MG/DL (ref 0.1–1)
BUN SERPL-MCNC: 14 MG/DL (ref 8–23)
CALCIUM SERPL-MCNC: 9.3 MG/DL (ref 8.7–10.5)
CHLORIDE SERPL-SCNC: 97 MMOL/L (ref 95–110)
CHOLEST SERPL-MCNC: 158 MG/DL (ref 120–199)
CHOLEST/HDLC SERPL: 1.8 {RATIO} (ref 2–5)
CO2 SERPL-SCNC: 29 MMOL/L (ref 23–29)
CREAT SERPL-MCNC: 0.7 MG/DL (ref 0.5–1.4)
DIFFERENTIAL METHOD: ABNORMAL
EOSINOPHIL # BLD AUTO: 0.1 K/UL (ref 0–0.5)
EOSINOPHIL NFR BLD: 1.6 % (ref 0–8)
ERYTHROCYTE [DISTWIDTH] IN BLOOD BY AUTOMATED COUNT: 14 % (ref 11.5–14.5)
EST. GFR  (AFRICAN AMERICAN): >60 ML/MIN/1.73 M^2
EST. GFR  (NON AFRICAN AMERICAN): >60 ML/MIN/1.73 M^2
FOLATE SERPL-MCNC: 10.2 NG/ML (ref 4–24)
GLUCOSE SERPL-MCNC: 56 MG/DL (ref 70–110)
HCT VFR BLD AUTO: 40 % (ref 40–54)
HDLC SERPL-MCNC: 89 MG/DL (ref 40–75)
HDLC SERPL: 56.3 % (ref 20–50)
HGB BLD-MCNC: 13.6 G/DL (ref 14–18)
IMM GRANULOCYTES # BLD AUTO: 0.01 K/UL (ref 0–0.04)
IMM GRANULOCYTES NFR BLD AUTO: 0.2 % (ref 0–0.5)
LDLC SERPL CALC-MCNC: 59.2 MG/DL (ref 63–159)
LYMPHOCYTES # BLD AUTO: 1.7 K/UL (ref 1–4.8)
LYMPHOCYTES NFR BLD: 38.8 % (ref 18–48)
MCH RBC QN AUTO: 31 PG (ref 27–31)
MCHC RBC AUTO-ENTMCNC: 34 G/DL (ref 32–36)
MCV RBC AUTO: 91 FL (ref 82–98)
MONOCYTES # BLD AUTO: 0.5 K/UL (ref 0.3–1)
MONOCYTES NFR BLD: 11.6 % (ref 4–15)
NEUTROPHILS # BLD AUTO: 2.1 K/UL (ref 1.8–7.7)
NEUTROPHILS NFR BLD: 46.7 % (ref 38–73)
NONHDLC SERPL-MCNC: 69 MG/DL
NRBC BLD-RTO: 0 /100 WBC
PLATELET # BLD AUTO: 250 K/UL (ref 150–350)
PMV BLD AUTO: 10.5 FL (ref 9.2–12.9)
POTASSIUM SERPL-SCNC: 4.1 MMOL/L (ref 3.5–5.1)
PROT SERPL-MCNC: 6.5 G/DL (ref 6–8.4)
RBC # BLD AUTO: 4.39 M/UL (ref 4.6–6.2)
SODIUM SERPL-SCNC: 136 MMOL/L (ref 136–145)
TRIGL SERPL-MCNC: 49 MG/DL (ref 30–150)
VIT B12 SERPL-MCNC: 530 PG/ML (ref 210–950)
WBC # BLD AUTO: 4.48 K/UL (ref 3.9–12.7)

## 2019-04-22 PROCEDURE — 82607 VITAMIN B-12: CPT

## 2019-04-22 PROCEDURE — 82306 VITAMIN D 25 HYDROXY: CPT

## 2019-04-22 PROCEDURE — 84425 ASSAY OF VITAMIN B-1: CPT

## 2019-04-22 PROCEDURE — 82746 ASSAY OF FOLIC ACID SERUM: CPT

## 2019-04-22 PROCEDURE — 80061 LIPID PANEL: CPT

## 2019-04-22 PROCEDURE — 80053 COMPREHEN METABOLIC PANEL: CPT

## 2019-04-22 PROCEDURE — 84590 ASSAY OF VITAMIN A: CPT

## 2019-04-22 PROCEDURE — 36415 COLL VENOUS BLD VENIPUNCTURE: CPT | Mod: PO

## 2019-04-22 PROCEDURE — 85025 COMPLETE CBC W/AUTO DIFF WBC: CPT

## 2019-04-24 LAB — VIT B1 BLD-MCNC: 51 UG/L (ref 38–122)

## 2019-04-29 LAB — VIT A SERPL-MCNC: 54 UG/DL (ref 38–106)

## 2019-05-20 ENCOUNTER — PATIENT OUTREACH (OUTPATIENT)
Dept: OTHER | Facility: OTHER | Age: 64
End: 2019-05-20

## 2019-05-20 NOTE — PROGRESS NOTES
Last 5 Patient Entered Readings                                      Current 30 Day Average: 123/73     Recent Readings 5/17/2019 5/17/2019 5/17/2019 5/14/2019 5/5/2019    SBP (mmHg) 124 130 136 119 125    DBP (mmHg) 72 77 81 67 69    Pulse 73 75 81 63 66        Digital Medicine: Health  Follow Up    Lifestyle Modifications:    1.Dietary Modifications (Sodium intake <2,000mg/day, food labels, dining out): Patient maintains low sodium diet.      2.Physical Activity: Patient continues to workout at the gym for 2 hours and riding his bike 13 miles regularly.    3.Medication Therapy: Patient has been compliant with the medication regimen. Not currently on meds.    4.Patient has the following medication side effects/concerns:   (Frequency/Alleviating factors/Precipitating factors, etc.)     Follow up with Mr. Mal Larkin completed. No further questions or concerns. Will continue to follow up to achieve health goals.

## 2019-05-20 NOTE — PROGRESS NOTES
Last 5 Patient Entered Readings                                      Current 30 Day Average: 123/73     Recent Readings 5/17/2019 5/17/2019 5/17/2019 5/14/2019 5/5/2019    SBP (mmHg) 124 130 136 119 125    DBP (mmHg) 72 77 81 67 69    Pulse 73 75 81 63 66        Digital Medicine: Health  Follow Up / New  Introduction    Left voicemail to follow up with Debby Mal Silverio Chaya.  Current BP average 123/73 mmHg is at goal, 130/80 mmHg.

## 2019-05-27 ENCOUNTER — OFFICE VISIT (OUTPATIENT)
Dept: ORTHOPEDICS | Facility: CLINIC | Age: 64
End: 2019-05-27
Payer: COMMERCIAL

## 2019-05-27 DIAGNOSIS — Z98.1 HISTORY OF LUMBAR FUSION: Primary | ICD-10-CM

## 2019-05-27 PROCEDURE — 72100 X-RAY EXAM L-S SPINE 2/3 VWS: CPT | Mod: TC,,, | Performed by: ORTHOPAEDIC SURGERY

## 2019-05-27 PROCEDURE — 72100 PR  X-RAY LUMBAR SPINE 2/3 VW: ICD-10-PCS | Mod: 26,,, | Performed by: PHYSICIAN ASSISTANT

## 2019-05-27 PROCEDURE — 99213 OFFICE O/P EST LOW 20 MIN: CPT | Mod: 25,,, | Performed by: PHYSICIAN ASSISTANT

## 2019-05-27 PROCEDURE — 72100 X-RAY EXAM L-S SPINE 2/3 VWS: CPT | Mod: 26,,, | Performed by: PHYSICIAN ASSISTANT

## 2019-05-27 PROCEDURE — 72100 PR  X-RAY LUMBAR SPINE 2/3 VW: ICD-10-PCS | Mod: TC,,, | Performed by: ORTHOPAEDIC SURGERY

## 2019-05-27 PROCEDURE — 99213 PR OFFICE/OUTPT VISIT, EST, LEVL III, 20-29 MIN: ICD-10-PCS | Mod: 25,,, | Performed by: PHYSICIAN ASSISTANT

## 2019-05-27 NOTE — PROGRESS NOTES
Subjective:       Patient ID: Mal Larkin is a 63 y.o. male.    Chief Complaint: Pain of the Lumbar Spine (Lumbar is good. Minor pain in lumbar and pain does not radiate. He has a few numb sports in upper left leg. No other treatment.)      History of Present Illness  Patient is here for postoperative appointment almost 6 months status post L1, L2, L3 Laminectomy, L4-5 Re-exploration, L1-2, L2-3, L3-4, L4-5, L5-S1 PLF(12.11.18). Overall he is doing exceptionally well with some residual postoperative low back pain. And almost complete resolution of lower extremity radicular symptoms.    Current Medications  Current Outpatient Medications   Medication Sig Dispense Refill    calcium-vitamin D3 (CALCIUM 500 + D) 500 mg(1,250mg) -200 unit per tablet Take 1 tablet by mouth Daily.      COQ10, LIPOSOMAL UBIQUINOL, ORAL Take 1 capsule by mouth once daily.      KRILL OIL ORAL Take 1 capsule by mouth once daily.      multivitamin (ONE DAILY MULTIVITAMIN) per tablet Take 1 tablet by mouth once daily.       No current facility-administered medications for this visit.        Allergies  Review of patient's allergies indicates:  No Known Allergies    Past Medical History  Past Medical History:   Diagnosis Date    Adenomatous colon polyp     + dysplasia    Colon polyp     Fatty liver     Hypertension     Melanoma 2011    in situ - upper back       Surgical History  Past Surgical History:   Procedure Laterality Date    APPENDECTOMY      BACK SURGERY      transverse process fracture    BLOCK-NERVE-MEDIAL BRANCH-LUMBAR Bilateral 8/3/2017    Per[facility] by [facility] at [facility] OR    COLONOSCOPY  11/11/2013    Dr. Hayes, 3 year recheck    COLONOSCOPY N/A 12/20/2016    [facility]    COLONOSCOPY N/A 11/11/2013    [facility]    COLONOSCOPY N/A 8/1/2013    [facility] at [facility]    FUSION, SPINE, LUMBAR, PLIF L1-2, L2-3,  L3-4, L4-5, L5-S1 N/A 12/11/2018    [facility] at [facility] OR    GASTRIC SLEEVE      HAND SURGERY      right dupuytrens release on 8/5/13    LAMINECTOMY, SPINE, LUMBAR L1, L2, L3 with  L4-L5 Re-exploration N/A 12/11/2018    [facility] at [facility] OR    LUMBAR LAMINECTOMY  2011    melanoma removal  2011    RADIOFREQUENCY THERMOCOAGULATION (RFTC)-NERVE-MEDIAN BRANCH-LUMBAR Bilateral 8/22/2017    [facility]    RELEASE DUPUYTREN'S CONTRACTURE Right 8/5/2013    [facility] by [facility]el [facility]inchilla [facility] at [facility] OR    SPINE SURGERY  12/11/2018    Dr Cox L1 - S1. lamanectomy/fusion    WISDOM TOOTH EXTRACTION         Family History:   Family History   Problem Relation Age of Onset    Skin cancer Mother     Ovarian cancer Mother     Heart disease Mother     Prostate cancer Father     Colon cancer Father     Cancer Father         glands    Melanoma Father     Melanoma Sister     Asthma Daughter     Fibromyalgia Daughter     Heart disease Maternal Uncle     Alcohol abuse Maternal Uncle     Cancer Paternal Aunt     Cerebral aneurysm Maternal Grandmother     Early death Maternal Grandmother         fall hit head    Heart disease Maternal Grandfather     Cancer Paternal Grandmother     Pancreatic cancer Paternal Grandmother     Cancer Sister         cervix, abd     Melanoma Sister     Skin cancer Sister     Vaginal cancer Sister     Macular degeneration Neg Hx     Retinal detachment Neg Hx     Glaucoma Neg Hx     Psoriasis Neg Hx     Lupus Neg Hx     Eczema Neg Hx        Social History:   Social History     Socioeconomic History    Marital status:      Spouse name: Not on file    Number of children: Not on file    Years of education: Not on file    Highest education level: Not on file   Occupational History    Occupation:      Employer: [employer]   Social Needs    Financial resource strain: Not hard at all     Food insecurity:     Worry: Never true     Inability: Never true    Transportation needs:     Medical: No     Non-medical: No   Tobacco Use    Smoking status: Former Smoker     Packs/day: 1.00     Years: 30.00     Pack years: 30.00     Types: Cigarettes     Last attempt to quit: 2014     Years since quittin.0    Smokeless tobacco: Former User     Types: Chew     Quit date: 1979   Substance and Sexual Activity    Alcohol use: Yes     Alcohol/week: 6.0 oz     Types: 12 Standard drinks or equivalent per week     Frequency: Never     Binge frequency: Never     Comment: Drinks beer only and mostly during football season    Drug use: No    Sexual activity: Not on file   Lifestyle    Physical activity:     Days per week: 7 days     Minutes per session: 150+ min    Stress: Not at all   Relationships    Social connections:     Talks on phone: Three times a week     Gets together: More than three times a week     Attends Mosque service: Not on file     Active member of club or organization: No     Attends meetings of clubs or organizations: Never     Relationship status:    Other Topics Concern    Not on file   Social History Narrative    Not on file       Hospitalization/Major Diagnostic Procedure:     Review of Systems     General/Constitutional:  Chills denies. Fatigue denies. Fever denies. Weight gain denies. Weight loss denies.    Respiratory:  Shortness of breath denies.    Cardiovascular:  Chest pain denies.    Gastrointestinal:  Constipation denies. Diarrhea denies. Nausea denies. Vomiting denies.     Hematology:  Easy bruising denies. Prolonged bleeding denies.     Genitourinary:  Frequent urination denies. Pain in lower back denies. Painful urination denies.     Musculoskeletal:  See HPI for details    Skin:  Rash denies.    Neurologic:  Dizziness denies. Gait abnormalities denies. Seizures denies. Tingling/Numbess denies.    Psychiatric:  Anxiety denies. Depressed mood denies.      Objective:   Vital Signs: There were no vitals filed for this visit.     Physical Exam      General Examination:     Constitutional: The patient is alert and oriented to lace person and time. Mood is pleasant.     Head/Face: Normal facial features normal eyebrows    Eyes: Normal extraocular motion bilaterally    Lungs: Respirations are equal and unlabored    Gait is coordinated.    Cardiovascular: There are no swelling or varicosities present.    Lymphatic: Negative for adenopathy    Skin: Normal    Neurological: Level of consciousness normal. Oriented to place person and time and situation    Psychiatric: Oriented to time place person and situation    Lumbar exam: Nonantalgic gait. Lumbar range of motion diminished approximately 50%. Posterior lumbar incision remains well-healed. Bilateral lower extremities demonstrate good active range of motion and normal strength except for left EHL and anterior tibialis which is 3+/5. Equal and symmetric DTRs. Negative straight leg raise maneuver    XRAY Report/ Interpretation: Lumbar AP and lateral x-rays taken in the office today reviewed the patient demonstrated a normal postoperative appearance with no failure of hardware      Assessment:       1. History of lumbar fusion        Plan:       Mal was seen today for pain.    Diagnoses and all orders for this visit:    History of lumbar fusion  -     X-Ray Lumbar Spine Ap And Lateral         No follow-ups on file.    Continue with activity as tolerated. Follow-up in 6 months for one last set of x-rays    This note was created using Dragon voice recognition software that occasionally misinterpreted phrases or words.

## 2019-06-03 ENCOUNTER — OFFICE VISIT (OUTPATIENT)
Dept: PHYSICAL MEDICINE AND REHAB | Facility: CLINIC | Age: 64
End: 2019-06-03
Payer: COMMERCIAL

## 2019-06-03 VITALS
WEIGHT: 173 LBS | HEART RATE: 64 BPM | BODY MASS INDEX: 27.15 KG/M2 | DIASTOLIC BLOOD PRESSURE: 65 MMHG | SYSTOLIC BLOOD PRESSURE: 115 MMHG | HEIGHT: 67 IN

## 2019-06-03 DIAGNOSIS — M25.522 LEFT ELBOW PAIN: Primary | ICD-10-CM

## 2019-06-03 DIAGNOSIS — M77.02 GOLFER'S ELBOW, LEFT: ICD-10-CM

## 2019-06-03 PROCEDURE — 76882 US LMTD JT/FCL EVL NVASC XTR: CPT | Mod: S$GLB,,, | Performed by: PHYSICAL MEDICINE & REHABILITATION

## 2019-06-03 PROCEDURE — 3008F BODY MASS INDEX DOCD: CPT | Mod: CPTII,S$GLB,, | Performed by: PHYSICAL MEDICINE & REHABILITATION

## 2019-06-03 PROCEDURE — 3078F PR MOST RECENT DIASTOLIC BLOOD PRESSURE < 80 MM HG: ICD-10-PCS | Mod: CPTII,S$GLB,, | Performed by: PHYSICAL MEDICINE & REHABILITATION

## 2019-06-03 PROCEDURE — 3078F DIAST BP <80 MM HG: CPT | Mod: CPTII,S$GLB,, | Performed by: PHYSICAL MEDICINE & REHABILITATION

## 2019-06-03 PROCEDURE — 99203 OFFICE O/P NEW LOW 30 MIN: CPT | Mod: 25,S$GLB,, | Performed by: PHYSICAL MEDICINE & REHABILITATION

## 2019-06-03 PROCEDURE — 99203 PR OFFICE/OUTPT VISIT, NEW, LEVL III, 30-44 MIN: ICD-10-PCS | Mod: 25,S$GLB,, | Performed by: PHYSICAL MEDICINE & REHABILITATION

## 2019-06-03 PROCEDURE — 99999 PR PBB SHADOW E&M-EST. PATIENT-LVL III: CPT | Mod: PBBFAC,,, | Performed by: PHYSICAL MEDICINE & REHABILITATION

## 2019-06-03 PROCEDURE — 3074F PR MOST RECENT SYSTOLIC BLOOD PRESSURE < 130 MM HG: ICD-10-PCS | Mod: CPTII,S$GLB,, | Performed by: PHYSICAL MEDICINE & REHABILITATION

## 2019-06-03 PROCEDURE — 3074F SYST BP LT 130 MM HG: CPT | Mod: CPTII,S$GLB,, | Performed by: PHYSICAL MEDICINE & REHABILITATION

## 2019-06-03 PROCEDURE — 3008F PR BODY MASS INDEX (BMI) DOCUMENTED: ICD-10-PCS | Mod: CPTII,S$GLB,, | Performed by: PHYSICAL MEDICINE & REHABILITATION

## 2019-06-03 PROCEDURE — 99999 PR PBB SHADOW E&M-EST. PATIENT-LVL III: ICD-10-PCS | Mod: PBBFAC,,, | Performed by: PHYSICAL MEDICINE & REHABILITATION

## 2019-06-03 PROCEDURE — 76882 PR  US,EXTREMITY,NONVASCULAR,REAL-TIME IMAGE,LIMITED: ICD-10-PCS | Mod: S$GLB,,, | Performed by: PHYSICAL MEDICINE & REHABILITATION

## 2019-06-03 NOTE — PROGRESS NOTES
Subjective:      Patient ID: Mal Larkin is a 63 y.o. male.    Chief Complaint: Elbow Pain (left elbow)    HPI   Patient Jovanni Larkin is a 63 year old male referred by Dr. Marino PCP for left elbow pain. The pain has been off and on for years, most recently getting worse in the last 6-8 months. He reports the pain is a 4-5/10 at its worse and located over the medial aspect of his left elbow. He reports pain with weighted arm flexion and during golf. He decreased his workouts which initially helped, however he continues to have pain during exercise and with golf. He does have a history of Dupuytren's contracture in both hands s/p right sided surgery. He has been treating it with a band around the elbow and Aleve daily. He has not had any treatment for this issue.     Review of Systems   Constitutional: Negative for fever.   HENT: Negative for drooling.    Eyes: Negative for discharge.   Respiratory: Negative for choking.    Cardiovascular: Negative for chest pain.   Genitourinary: Negative for flank pain.   Skin: Negative for wound.   Allergic/Immunologic: Negative for immunocompromised state.   Neurological: Negative for tremors and syncope.   Psychiatric/Behavioral: Negative for behavioral problems.     Past Medical History:   Past Medical History:   Diagnosis Date    Adenomatous colon polyp     + dysplasia    Colon polyp     Fatty liver     Hypertension     Melanoma 2011    in situ - upper back       Past Surgical History:   Past Surgical History:   Procedure Laterality Date    APPENDECTOMY      BACK SURGERY      transverse process fracture    BLOCK-NERVE-MEDIAL BRANCH-LUMBAR Bilateral 8/3/2017    [facility]    COLONOSCOPY  11/11/2013    Dr. Hayes, 3 year recheck    COLONOSCOPY N/A 12/20/2016    Performed by [facility] at [facility]    COLONOSCOPY N/A 11/11/2013    [facility] at [facility]    COLONOSCOPY N/A 8/1/2013    Performed by  [facility] at [facility]    FUSION, SPINE, LUMBAR, PLIF L1-2, L2-3, L3-4, L4-5, L5-S1 N/A 12/11/2018    Performed by Vik Cox [facility] at [facility] OR    GASTRIC SLEEVE      HAND SURGERY      right dupuytrens release on 8/5/13    LAMINECTOMY, SPINE, LUMBAR L1, L2, L3 with  L4-L5 Re-exploration N/A 12/11/2018    Performed by Vik Cox [facility] at [facility] OR    LUMBAR LAMINECTOMY  2011    melanoma removal  2011    RADIOFREQUENCY THERMOCOAGULATION (RFTC)-NERVE-MEDIAN BRANCH-LUMBAR Bilateral 8/22/2017    [facility]    RELEASE DUPUYTREN'S CONTRACTURE Right 8/5/2013    Performed by Jamel [facility]illa [facility] at [facility] OR    SPINE SURGERY  12/11/2018    Dr Cox L1 - S1. lamanectomy/fusion    WISDOM TOOTH EXTRACTION         Family History:   Family History   Problem Relation Age of Onset    Skin cancer Mother     Ovarian cancer Mother     Heart disease Mother     Prostate cancer Father     Colon cancer Father     Cancer Father         glands    Melanoma Father     Melanoma Sister     Asthma Daughter     Fibromyalgia Daughter     Heart disease Maternal Uncle     Alcohol abuse Maternal Uncle     Cancer Paternal Aunt     Cerebral aneurysm Maternal Grandmother     Early death Maternal Grandmother         fall hit head    Heart disease Maternal Grandfather     Cancer Paternal Grandmother     Pancreatic cancer Paternal Grandmother     Cancer Sister         cervix, abd     Melanoma Sister     Skin cancer Sister     Vaginal cancer Sister     Macular degeneration Neg Hx     Retinal detachment Neg Hx     Glaucoma Neg Hx     Psoriasis Neg Hx     Lupus Neg Hx     Eczema Neg Hx        Medications:   Current Outpatient Medications on File Prior to Visit   Medication Sig Dispense Refill    calcium-vitamin D3 (CALCIUM 500 + D) 500 mg(1,250mg) -200 unit per tablet Take 1 tablet by mouth Daily.      COQ10, LIPOSOMAL UBIQUINOL, ORAL Take 1 capsule by mouth once daily.       KRILL OIL ORAL Take 1 capsule by mouth once daily.      multivitamin (ONE DAILY MULTIVITAMIN) per tablet Take 1 tablet by mouth once daily.       No current facility-administered medications on file prior to visit.        Allergies: Review of patient's allergies indicates:  No Known Allergies    Social History:   Social History     Socioeconomic History    Marital status:      Spouse name: Not on file    Number of children: Not on file    Years of education: Not on file    Highest education level: Not on file   Occupational History    Occupation:      Employer: [employer]   Social Needs    Financial resource strain: Not hard at all    Food insecurity:     Worry: Never true     Inability: Never true    Transportation needs:     Medical: No     Non-medical: No   Tobacco Use    Smoking status: Former Smoker     Packs/day: 1.00     Years: 30.00     Pack years: 30.00     Types: Cigarettes     Last attempt to quit: 2014     Years since quittin.0    Smokeless tobacco: Former User     Types: Chew     Quit date: 1979   Substance and Sexual Activity    Alcohol use: Yes     Alcohol/week: 6.0 oz     Types: 12 Standard drinks or equivalent per week     Frequency: Never     Binge frequency: Never     Comment: Drinks beer only and mostly during football season    Drug use: No    Sexual activity: Not on file   Lifestyle    Physical activity:     Days per week: 7 days     Minutes per session: 150+ min    Stress: Not at all   Relationships    Social connections:     Talks on phone: Three times a week     Gets together: More than three times a week     Attends Buddhism service: Not on file     Active member of club or organization: No     Attends meetings of clubs or organizations: Never     Relationship status:    Other Topics Concern    Not on file   Social History Narrative    Not on file     PHYSICAL EXAMINATION:   General    Vitals:    19 0716   BP: 115/65  "  Pulse: 64   Weight: 78.5 kg (173 lb)   Height: 5' 7" (1.702 m)     Constitutional: Oriented to person, place, and time. No apparent distress. Appears well-developed and well-nourished. Pleasant.  HENT:   Head: Normocephalic and atraumatic.   Eyes: Right eye exhibits no discharge. Left eye exhibits no discharge. No scleral icterus.   Pulmonary/Chest: Effort normal. No respiratory distress.   Abdominal: There is no guarding.   Neurological: Alert and oriented to person, place, and time.   Psychiatric: Behavior is normal.   Left Elbow Exam     Tenderness   The patient is experiencing tenderness in the medial epicondyle.     Range of Motion   Extension: 0   Flexion: 140     Muscle Strength   The patient has normal left elbow strength.    Tests   Varus: negative  Valgus: negative        Comments:  Pain of common extensor tendon left elbow  Stable valgus force without pain  Valgus extension overload negative  No pain with elbow extension  Pain with milking maneuver  Pain with wrist flexion  Positive finkers test  Negative Yurgasons  There is no swelling, ecchymoses, erythema or gross deformity.    Diagnostic ultrasound exam:  Limited diagnostic exam was performed today of the left medial elbow.  The images were saved will be uploaded to EMR.  There was no medial elbow joint effusion.  The ulnar collateral ligament appear to be intact and normal in appearance.  The overlying common flexor tendon was somewhat thickened at the proximal portion attachment on the medial epicondyle.  There was an area of hypoechoic echotexture within the middle 1/3 of the tendon substance.  There was there is no hypoechoic defect within the tendon would suggest significant tearing.  There was a mild degree of hyperemia/neovascularization seen within the proximal 1/3 of the tendon on color Doppler function.  The proximal portion of the tendon was tender to sono palpation.    Data Reviewed: ultrasound    Supportive Actions: Independent " "visualization of images or test specimens    ASSESSMENT:   1. Left elbow pain    2. Golfer's elbow, left      PLAN:     1. Time was spent reviewing the above diagnosis in depth with Mal guadalupe, including acute management and rehabilitation.     2.  His signs, symptoms, and diagnostic ultrasound exam are all consistent with a diagnosis of left medial epicondylitis.  Discussed conservative therapy with physical therapy and possible corticosteroid injection and PRP or Tenex for more aggressive treatment.    3. Patient amenable to conservative therapy at this time with physical therapy at [facility].  Also recommended dry needling and scraping techniques.    4. Educated patient to reduce Aleve and start Tylenol for symptom management.    5. RTC 6 weeks or sooner should his symptoms persist.    This is a consult from Dr. Tito Marino. Please see the "Communications" section of Epic to see how the consulting physician received the report of today's findings and recommendations. If it's an [facility] physician, it will be forwarded to his/her "in basket".    The above note was completed, in part, with the aid of Dragon dictation software/hardware. Translation errors may be present..        "

## 2019-06-21 ENCOUNTER — TELEPHONE (OUTPATIENT)
Dept: FAMILY MEDICINE | Facility: CLINIC | Age: 64
End: 2019-06-21

## 2019-06-21 DIAGNOSIS — R91.8 PULMONARY NODULES: Primary | ICD-10-CM

## 2019-06-21 DIAGNOSIS — Z12.2 ENCOUNTER FOR SCREENING FOR LUNG CANCER: ICD-10-CM

## 2019-06-21 NOTE — TELEPHONE ENCOUNTER
----- Message from Adalgisa Gottlieb, RT sent at 6/21/2019 12:25 PM CDT -----  Good afternoon,    Mr Larkin is due for a follow up lung screen CT on 7/8/19...can Dr Marino put an order in Epic for this? If he allows, I can put the order in and call the patient to schedule. Please advise.    Thank you!    Adalgisa

## 2019-07-15 ENCOUNTER — PATIENT OUTREACH (OUTPATIENT)
Dept: OTHER | Facility: OTHER | Age: 64
End: 2019-07-15

## 2019-07-15 NOTE — PROGRESS NOTES
Last 5 Patient Entered Readings                                      Current 30 Day Average: 126/74     Recent Readings 7/11/2019 7/11/2019 7/11/2019 7/11/2019 7/11/2019    SBP (mmHg) 129 143 130 127 131    DBP (mmHg) 77 78 75 75 84    Pulse 58 59 60 60 59        Digital Medicine: Health  Follow Up    Patient states that his cuff is not working properly.  It is taking several attempts before the cuff will take a reading.  I sent him the schedule for the [facility] so he can go when it is in his area.    Lifestyle Modifications:    1.Dietary Modifications (Sodium intake <2,000mg/day, food labels, dining out): Patient reports not change in diet.    2.Physical Activity: Patient continues to exercise daily.  He is currently out of town, but will resume when he returns next week.    3.Medication Therapy: no hypertension meds currently prescribed.    Follow up with Mr. Mal Larkin completed. No further questions or concerns. Will continue to follow up to achieve health goals.

## 2019-07-17 ENCOUNTER — PATIENT MESSAGE (OUTPATIENT)
Dept: ADMINISTRATIVE | Facility: OTHER | Age: 64
End: 2019-07-17

## 2019-07-23 ENCOUNTER — PATIENT OUTREACH (OUTPATIENT)
Dept: OTHER | Facility: OTHER | Age: 64
End: 2019-07-23

## 2019-07-23 NOTE — PROGRESS NOTES
Last 5 Patient Entered Readings                                      Current 30 Day Average: 128/76     Recent Readings 7/23/2019 7/20/2019 7/20/2019 7/20/2019 7/20/2019    SBP (mmHg) 126 130 132 140 133    DBP (mmHg) 68 80 86 82 83    Pulse 69 57 58 61 60        Per 30 day average, 128/76 mmHg, patient's BP is at goal.     Mr. Larkin's BP remains controlled without the use of BP medications. He has been having trouble getting BP monitor to connect, so he will visit the [facility] when it is in [place].     Patient's health , Autumn Urias, will be following up. I will continue to monitor regularly and will follow up in 4-6 months, sooner if BP begins to trend upward or downward.    Asked patient to call or message with questions or concerns.     Current HTN regimen:

## 2019-07-24 ENCOUNTER — PATIENT MESSAGE (OUTPATIENT)
Dept: FAMILY MEDICINE | Facility: CLINIC | Age: 64
End: 2019-07-24

## 2019-07-29 ENCOUNTER — HOSPITAL ENCOUNTER (OUTPATIENT)
Dept: RADIOLOGY | Facility: HOSPITAL | Age: 64
Discharge: HOME | End: 2019-07-29
Attending: FAMILY MEDICINE
Payer: COMMERCIAL

## 2019-07-29 ENCOUNTER — PATIENT MESSAGE (OUTPATIENT)
Dept: ADMINISTRATIVE | Facility: OTHER | Age: 64
End: 2019-07-29

## 2019-07-29 DIAGNOSIS — R91.8 PULMONARY NODULES: ICD-10-CM

## 2019-07-29 DIAGNOSIS — Z12.2 ENCOUNTER FOR SCREENING FOR LUNG CANCER: ICD-10-CM

## 2019-07-29 PROCEDURE — G0297 CT CHEST LUNG SCREENING LOW DOSE: ICD-10-PCS | Mod: 26,,, | Performed by: RADIOLOGY

## 2019-07-29 PROCEDURE — G0297 LDCT FOR LUNG CA SCREEN: HCPCS | Mod: TC

## 2019-07-29 PROCEDURE — G0297 LDCT FOR LUNG CA SCREEN: HCPCS | Mod: 26,,, | Performed by: RADIOLOGY

## 2019-08-14 ENCOUNTER — PATIENT OUTREACH (OUTPATIENT)
Dept: OTHER | Facility: OTHER | Age: 64
End: 2019-08-14

## 2019-08-14 NOTE — PROGRESS NOTES
Last 5 Patient Entered Readings                                      Current 30 Day Average: 129/75     Recent Readings 8/14/2019 8/14/2019 8/10/2019 7/31/2019 7/31/2019    SBP (mmHg) 128 131 131 129 133    DBP (mmHg) 67 71 71 66 75    Pulse 61 62 68 57 57      Digital Medicine: Health  Follow Up    Patient hasn't been able to go to [facility].  He will be doing some traveling in the next few weeks to help with family.  I offered to put him on hiatus, but he declined.  He says he will take is cuff with him.    Lifestyle Modifications:    1.Dietary Modifications (Sodium intake <2,000mg/day, food labels, dining out): Patient continues to limit his salt and processed food intake.    2.Physical Activity: Patient says that he continues regular exercise.    Patient not currently prescribe hypertension medication.    Follow up with Mr. Mal Larkin completed. No further questions or concerns. Will continue to follow up to achieve health goals.

## 2019-09-11 ENCOUNTER — PATIENT OUTREACH (OUTPATIENT)
Dept: OTHER | Facility: OTHER | Age: 64
End: 2019-09-11

## 2019-10-07 NOTE — PROGRESS NOTES
Digital Medicine: Health  Follow-Up    Patient states that he is doing well.  He still says his cuff is not accurate and he is still trying to get to the [facility].    The history is provided by the patient.     Follow Up  Follow-up reason(s): reading review              Diet:   Patient reports eating or drinking the following: fruit, water, fresh vegetables and lean proteinsHe has the following dietary restrictions: low sodium dietHe does not skip meals regularly.  He has no standard fasting periods.      Patient did not have times when they could not afford food or ran out.      Physical Activity:   When asked if exercising, patient responded: yes    He exercises for 180 minutes per day 5 day(s) a week.      Patient participates in the following activities: biking and weights and golf    He identified the following barriers to physical activity: no barriers to being active      SDOH    INTERVENTION(S)  encouragement/support      There are no preventive care reminders to display for this patient.    Last 5 Patient Entered Readings                                      Current 30 Day Average: 131/76     Recent Readings 10/2/2019 10/2/2019 9/24/2019 9/8/2019 9/8/2019    SBP (mmHg) 139 147 123 130 137    DBP (mmHg) 74 75 63 74 83    Pulse 69 71 68 56 57

## 2019-11-18 ENCOUNTER — PATIENT OUTREACH (OUTPATIENT)
Dept: OTHER | Facility: OTHER | Age: 64
End: 2019-11-18

## 2019-11-18 NOTE — PROGRESS NOTES
Digital Medicine: Health  Follow-Up    Patient states that he his out of town and was not able to bring his digital cuff with him.  I will place on hiatus until he returns on Dec. 2nd.    The history is provided by the patient. No  was used.     Follow Up  Follow-up reason(s): reading review      Readings are trending down       INTERVENTION(S)  encouragement/support    PLAN  patient verbalizes understanding and continue monitoring      There are no preventive care reminders to display for this patient.    Last 5 Patient Entered Readings                                      Current 30 Day Average: 144/79     Recent Readings 11/6/2019 11/6/2019 11/6/2019 11/6/2019 10/28/2019    SBP (mmHg) 141 142 135 146 146    DBP (mmHg) 78 78 73 80 77    Pulse 59 57 56 58 59                      Diet Screening   No change to diet.      Assigning the following patient goals: maintain low sodium diet    Physical Activity Screening   When asked if exercising, patient responded: no    Patient says he is out of town dealing with a family matter so he has not been exercising, but is staying active.      SDOH

## 2019-12-04 ENCOUNTER — OFFICE VISIT (OUTPATIENT)
Dept: ORTHOPEDICS | Facility: CLINIC | Age: 64
End: 2019-12-04
Payer: COMMERCIAL

## 2019-12-04 VITALS — DIASTOLIC BLOOD PRESSURE: 74 MMHG | SYSTOLIC BLOOD PRESSURE: 142 MMHG | WEIGHT: 165 LBS | BODY MASS INDEX: 25.84 KG/M2

## 2019-12-04 DIAGNOSIS — Z98.1 HISTORY OF LUMBAR FUSION: Primary | ICD-10-CM

## 2019-12-04 DIAGNOSIS — M48.062 SPINAL STENOSIS, LUMBAR REGION, WITH NEUROGENIC CLAUDICATION: ICD-10-CM

## 2019-12-04 DIAGNOSIS — M51.36 DDD (DEGENERATIVE DISC DISEASE), LUMBAR: ICD-10-CM

## 2019-12-04 PROCEDURE — 3078F DIAST BP <80 MM HG: CPT | Mod: S$GLB,,, | Performed by: ORTHOPAEDIC SURGERY

## 2019-12-04 PROCEDURE — 99213 OFFICE O/P EST LOW 20 MIN: CPT | Mod: 25,S$GLB,, | Performed by: ORTHOPAEDIC SURGERY

## 2019-12-04 PROCEDURE — 3077F SYST BP >= 140 MM HG: CPT | Mod: S$GLB,,, | Performed by: ORTHOPAEDIC SURGERY

## 2019-12-04 PROCEDURE — 3077F PR MOST RECENT SYSTOLIC BLOOD PRESSURE >= 140 MM HG: ICD-10-PCS | Mod: S$GLB,,, | Performed by: ORTHOPAEDIC SURGERY

## 2019-12-04 PROCEDURE — 3008F BODY MASS INDEX DOCD: CPT | Mod: S$GLB,,, | Performed by: ORTHOPAEDIC SURGERY

## 2019-12-04 PROCEDURE — 3078F PR MOST RECENT DIASTOLIC BLOOD PRESSURE < 80 MM HG: ICD-10-PCS | Mod: S$GLB,,, | Performed by: ORTHOPAEDIC SURGERY

## 2019-12-04 PROCEDURE — 3008F PR BODY MASS INDEX (BMI) DOCUMENTED: ICD-10-PCS | Mod: S$GLB,,, | Performed by: ORTHOPAEDIC SURGERY

## 2019-12-04 PROCEDURE — 99213 PR OFFICE/OUTPT VISIT, EST, LEVL III, 20-29 MIN: ICD-10-PCS | Mod: 25,S$GLB,, | Performed by: ORTHOPAEDIC SURGERY

## 2019-12-04 NOTE — PROGRESS NOTES
Subjective:       Patient ID: Mal Larkin is a 64 y.o. male.    Chief Complaint: Pain of the Lumbar Spine (Lumbar is good. Just a little stiffness. No other treatment)      History of Present Illness  Patient is here for postoperative appointment almost 12 months status post L1, L2, L3 Laminectomy, L4-5 Re-exploration, L1-2, L2-3, L3-4, L4-5, L5-S1 PLF(12.11.18). Overall he is doing exceptionally well with some residual postoperative low back pain. And almost complete resolution of lower extremity radicular symptoms.    Current Medications  Current Outpatient Medications   Medication Sig Dispense Refill    calcium-vitamin D3 (CALCIUM 500 + D) 500 mg(1,250mg) -200 unit per tablet Take 1 tablet by mouth Daily.      COQ10, LIPOSOMAL UBIQUINOL, ORAL Take 1 capsule by mouth once daily.      KRILL OIL ORAL Take 1 capsule by mouth once daily.      multivitamin (ONE DAILY MULTIVITAMIN) per tablet Take 1 tablet by mouth once daily.       No current facility-administered medications for this visit.        Allergies  Review of patient's allergies indicates:  No Known Allergies    Past Medical History  Past Medical History:   Diagnosis Date    Adenomatous colon polyp     + dysplasia    Colon polyp     Fatty liver     Hypertension     Melanoma 2011    in situ - upper back       Surgical History  Past Surgical History:   Procedure Laterality Date    APPENDECTOMY      BACK SURGERY      transverse process fracture    COLONOSCOPY  11/11/2013    Dr. Hayes, 3 year recheck    COLONOSCOPY N/A 12/20/2016    Procedure: COLONOSCOPY;  Surgeon: Atilio Hayes MD;  Location: [facility];  Service: Endoscopy;  Laterality: N/A;    FUSION OF LUMBAR SPINE USING POSTERIOR INTERBODY TECHNIQUE N/A 12/11/2018    Procedure: FUSION, SPINE, LUMBAR, PLIF L1-2, L2-3, L3-4, L4-5, L5-S1;  Surgeon: Vik Cox MD;  Location: [facility] OR;  Service: Orthopedics;  Laterality: N/A;    GASTRIC SLEEVE      HAND SURGERY      right  dupuytrens release on 8/5/13    LUMBAR LAMINECTOMY  2011    LUMBAR LAMINECTOMY N/A 12/11/2018    Procedure: LAMINECTOMY, SPINE, LUMBAR L1, L2, L3 with  L4-L5 Re-exploration;  Surgeon: Vik Cox MD;  Location: [facility];  Service: Orthopedics;  Laterality: N/A;    melanoma removal  2011    SPINE SURGERY  12/11/2018    Dr Cox L1 - S1. lamanectomy/fusion    WISDOM TOOTH EXTRACTION         Family History:   Family History   Problem Relation Age of Onset    Skin cancer Mother     Ovarian cancer Mother     Heart disease Mother     Prostate cancer Father     Colon cancer Father     Cancer Father         glands    Melanoma Father     Melanoma Sister     Asthma Daughter     Fibromyalgia Daughter     Heart disease Maternal Uncle     Alcohol abuse Maternal Uncle     Cancer Paternal Aunt     Cerebral aneurysm Maternal Grandmother     Early death Maternal Grandmother         fall hit head    Heart disease Maternal Grandfather     Cancer Paternal Grandmother     Pancreatic cancer Paternal Grandmother     Cancer Sister         cervix, abd     Melanoma Sister     Skin cancer Sister     Vaginal cancer Sister     Macular degeneration Neg Hx     Retinal detachment Neg Hx     Glaucoma Neg Hx     Psoriasis Neg Hx     Lupus Neg Hx     Eczema Neg Hx        Social History:   Social History     Socioeconomic History    Marital status:      Spouse name: Not on file    Number of children: Not on file    Years of education: Not on file    Highest education level: Not on file   Occupational History    Occupation:      Employer: [employer]   Social Needs    Financial resource strain: Not hard at all    Food insecurity:     Worry: Never true     Inability: Never true    Transportation needs:     Medical: No     Non-medical: No   Tobacco Use    Smoking status: Former Smoker     Packs/day: 1.00     Years: 30.00     Pack years: 30.00     Types: Cigarettes     Last attempt to  quit: 2014     Years since quittin.5    Smokeless tobacco: Former User     Types: Chew     Quit date: 1979   Substance and Sexual Activity    Alcohol use: Yes     Alcohol/week: 10.0 standard drinks     Types: 12 Standard drinks or equivalent per week     Frequency: Never     Binge frequency: Never     Comment: Drinks beer only and mostly during football season    Drug use: No    Sexual activity: Not on file   Lifestyle    Physical activity:     Days per week: 7 days     Minutes per session: 150+ min    Stress: Not at all   Relationships    Social connections:     Talks on phone: Three times a week     Gets together: More than three times a week     Attends Gnosticist service: Not on file     Active member of club or organization: No     Attends meetings of clubs or organizations: Never     Relationship status:    Other Topics Concern    Not on file   Social History Narrative    Not on file       Hospitalization/Major Diagnostic Procedure:     Review of Systems     General/Constitutional:  Chills denies. Fatigue denies. Fever denies. Weight gain denies. Weight loss denies.    Respiratory:  Shortness of breath denies.    Cardiovascular:  Chest pain denies.    Gastrointestinal:  Constipation denies. Diarrhea denies. Nausea denies. Vomiting denies.     Hematology:  Easy bruising denies. Prolonged bleeding denies.     Genitourinary:  Frequent urination denies. Pain in lower back denies. Painful urination denies.     Musculoskeletal:  See HPI for details    Skin:  Rash denies.    Neurologic:  Dizziness denies. Gait abnormalities denies. Seizures denies. Tingling/Numbess denies.    Psychiatric:  Anxiety denies. Depressed mood denies.     Objective:   Vital Signs:   Vitals:    19 0752   BP: (!) 142/74        Physical Exam      General Examination:     Constitutional: The patient is alert and oriented to lace person and time. Mood is pleasant.     Head/Face: Normal facial features normal  "eyebrows    Eyes: Normal extraocular motion bilaterally    Lungs: Respirations are equal and unlabored    Gait is coordinated.    Cardiovascular: There are no swelling or varicosities present.    Lymphatic: Negative for adenopathy    Skin: Normal    Neurological: Level of consciousness normal. Oriented to place person and time and situation    Psychiatric: Oriented to time place person and situation    Lumbar exam: Nonantalgic gait. Lumbar range of motion diminished approximately 50%. Posterior lumbar incision remains well-healed. Bilateral lower extremities demonstrate good active range of motion and normal strength except for left EHL and anterior tibialis which is 4+/5; however this is much improved. Equal and symmetric DTRs. Negative straight leg raise maneuver    XRAY Report/ Interpretation:  Postop lumbar AP and lateral x-rays taken in the office today and reviewed with the patient demonstrate L1-2, L2-3, L3-4, L4-5, L5-S1 PLF stable with no significant changes compared to previous x-rays.  There is some adjacent level degenerative change at T12-L1      Assessment:       1. History of lumbar fusion    2. DDD (degenerative disc disease), lumbar    3. Spinal stenosis, lumbar region, with neurogenic claudication        Plan:       Mal was seen today for pain.    Diagnoses and all orders for this visit:    History of lumbar fusion    DDD (degenerative disc disease), lumbar  -     X-Ray Lumbar Spine Ap And Lateral    Spinal stenosis, lumbar region, with neurogenic claudication  -     X-Ray Lumbar Spine Ap And Lateral         No follow-ups on file.  Oz Grimaldo, physician's assistant served in the capacity as a "scribe" for this patient encounter  A "face to face" encounter occurred with Dr. Cox on this date  The treatment plan and medical decision making is outlined below:  Continue with activity as tolerated.  Follow-up as needed    This note was created using Dragon voice recognition software that " occasionally misinterpreted phrases or words.

## 2019-12-18 ENCOUNTER — PATIENT MESSAGE (OUTPATIENT)
Dept: ADMINISTRATIVE | Facility: OTHER | Age: 64
End: 2019-12-18

## 2019-12-31 ENCOUNTER — PATIENT OUTREACH (OUTPATIENT)
Dept: OTHER | Facility: OTHER | Age: 64
End: 2019-12-31

## 2019-12-31 NOTE — PROGRESS NOTES
Digital Medicine: Health  Follow-Up    Patient still questions the accuracy of the digital cuff.  He says that he keeps missing the mobile OBar.      The history is provided by the patient. No  was used.     Follow Up  Follow-up reason(s): reading review      Readings are trending down       INTERVENTION(S)  recommended diet modifications, encouragement/support, denied resources and denied questions    PLAN  patient verbalizes understanding and continue monitoring      There are no preventive care reminders to display for this patient.    Last 5 Patient Entered Readings                                      Current 30 Day Average: 125/74     Recent Readings 12/8/2019 12/4/2019 12/4/2019 12/4/2019 11/6/2019    SBP (mmHg) 123 126 133 143 141    DBP (mmHg) 68 72 77 79 78    Pulse 69 78 78 83 59                      Diet Screening   Patient reports eating or drinking the following: water, caffeine and sausages, salted nutsHe has the following dietary restrictions: low sodium diet    Patient says that he has not been eating well during the holidays.  He admits that he has been eating more salt than normal.      Intervention(s): reducing caffeine and reducing sodium intake    Assigning the following patient goals: maintain low sodium diet and reduce caffeine    Physical Activity Screening   No change to exercise routine.        Medication Adherence Screening   He misses doses: never    Patient is not selectively taking diuretics.    He does not wonder if medications are working.  He knows purpose of medications.        SDOH

## 2020-01-10 ENCOUNTER — OFFICE VISIT (OUTPATIENT)
Dept: GASTROENTEROLOGY | Facility: CLINIC | Age: 65
End: 2020-01-10
Payer: COMMERCIAL

## 2020-01-10 VITALS
HEIGHT: 67 IN | SYSTOLIC BLOOD PRESSURE: 146 MMHG | DIASTOLIC BLOOD PRESSURE: 78 MMHG | WEIGHT: 181.19 LBS | HEART RATE: 56 BPM | BODY MASS INDEX: 28.44 KG/M2

## 2020-01-10 DIAGNOSIS — M51.36 DDD (DEGENERATIVE DISC DISEASE), LUMBAR: Primary | ICD-10-CM

## 2020-01-10 DIAGNOSIS — K76.0 FATTY LIVER: ICD-10-CM

## 2020-01-10 DIAGNOSIS — D64.9 ANEMIA, UNSPECIFIED TYPE: ICD-10-CM

## 2020-01-10 DIAGNOSIS — D12.6 ADENOMATOUS POLYP OF COLON, UNSPECIFIED PART OF COLON: ICD-10-CM

## 2020-01-10 DIAGNOSIS — E66.9 OBESITY (BMI 30-39.9): ICD-10-CM

## 2020-01-10 DIAGNOSIS — Z86.010 HX OF COLONIC POLYPS: ICD-10-CM

## 2020-01-10 PROCEDURE — 3077F PR MOST RECENT SYSTOLIC BLOOD PRESSURE >= 140 MM HG: ICD-10-PCS | Mod: CPTII,S$GLB,, | Performed by: INTERNAL MEDICINE

## 2020-01-10 PROCEDURE — 99204 PR OFFICE/OUTPT VISIT, NEW, LEVL IV, 45-59 MIN: ICD-10-PCS | Mod: S$GLB,,, | Performed by: INTERNAL MEDICINE

## 2020-01-10 PROCEDURE — 3008F BODY MASS INDEX DOCD: CPT | Mod: CPTII,S$GLB,, | Performed by: INTERNAL MEDICINE

## 2020-01-10 PROCEDURE — 3078F PR MOST RECENT DIASTOLIC BLOOD PRESSURE < 80 MM HG: ICD-10-PCS | Mod: CPTII,S$GLB,, | Performed by: INTERNAL MEDICINE

## 2020-01-10 PROCEDURE — 3077F SYST BP >= 140 MM HG: CPT | Mod: CPTII,S$GLB,, | Performed by: INTERNAL MEDICINE

## 2020-01-10 PROCEDURE — 3078F DIAST BP <80 MM HG: CPT | Mod: CPTII,S$GLB,, | Performed by: INTERNAL MEDICINE

## 2020-01-10 PROCEDURE — 99999 PR PBB SHADOW E&M-EST. PATIENT-LVL III: CPT | Mod: PBBFAC,,, | Performed by: INTERNAL MEDICINE

## 2020-01-10 PROCEDURE — 99999 PR PBB SHADOW E&M-EST. PATIENT-LVL III: ICD-10-PCS | Mod: PBBFAC,,, | Performed by: INTERNAL MEDICINE

## 2020-01-10 PROCEDURE — 3008F PR BODY MASS INDEX (BMI) DOCUMENTED: ICD-10-PCS | Mod: CPTII,S$GLB,, | Performed by: INTERNAL MEDICINE

## 2020-01-10 PROCEDURE — 99204 OFFICE O/P NEW MOD 45 MIN: CPT | Mod: S$GLB,,, | Performed by: INTERNAL MEDICINE

## 2020-01-10 RX ORDER — ASCORBIC ACID 500 MG
500 TABLET ORAL DAILY
COMMUNITY

## 2020-01-10 RX ORDER — PNV NO.95/FERROUS FUM/FOLIC AC 28MG-0.8MG
1000 TABLET ORAL DAILY
COMMUNITY

## 2020-01-10 RX ORDER — LANOLIN ALCOHOL/MO/W.PET/CERES
100 CREAM (GRAM) TOPICAL DAILY
COMMUNITY

## 2020-01-10 RX ORDER — GLUCOSAMINE/CHONDRO SU A 500-400 MG
1 TABLET ORAL 3 TIMES DAILY
COMMUNITY

## 2020-01-10 NOTE — H&P (VIEW-ONLY)
"Subjective:       Patient ID: Mal Larkin is a 64 y.o. male.    This is an established patient.      Chief Complaint: History of colon polyps    Patient seen for personal history of colon polyps, diagnosed several years ago, multiple in number, large size, villous and adenomatous histology some of which had dysplasia, with associated signs/symptoms absent, and alleviating/exacerbating factors including none.  His last colonoscopy was 3 years ago with me.  He has had bariatric surgery (sleeve) in the interim and has lost weight accordingly.  He denies any abdominal pain, bleeding, or change in bowel habits.  He has had anemia as of last lab draw, but this may have been perioperative.  No other complaints currently.  He has not had iron studies checked.      Review of Systems   Constitutional: Negative for chills, fatigue and fever.   HENT: Negative for trouble swallowing.    Respiratory: Negative for cough, shortness of breath and wheezing.    Cardiovascular: Negative for chest pain and palpitations.   Gastrointestinal: Negative for abdominal pain, constipation, diarrhea, nausea and vomiting.   Musculoskeletal: Negative for arthralgias and myalgias.   Skin: Negative for color change and rash.   Neurological: Negative for dizziness, weakness and numbness.   Psychiatric/Behavioral: Negative for confusion. The patient is not nervous/anxious.    All other systems reviewed and are negative.      Objective:       Vitals:    01/10/20 0812   BP: (!) 146/78   Pulse: (!) 56   Weight: 82.2 kg (181 lb 3.5 oz)   Height: 5' 7" (1.702 m)       Physical Exam   Constitutional: He is oriented to person, place, and time. He appears well-developed and well-nourished.   HENT:   Head: Normocephalic and atraumatic.   Eyes: Pupils are equal, round, and reactive to light. No scleral icterus.   Cardiovascular: Normal rate and regular rhythm.   No murmur heard.  Pulmonary/Chest: Effort normal and breath sounds normal. He has no wheezes. "   Abdominal: Soft. Bowel sounds are normal. He exhibits no distension. There is no tenderness.   Neurological: He is alert and oriented to person, place, and time.   Psychiatric: He has a normal mood and affect. His behavior is normal.         Lab Results   Component Value Date    WBC 4.48 04/22/2019    HGB 13.6 (L) 04/22/2019    HCT 40.0 04/22/2019    MCV 91 04/22/2019     04/22/2019         CMP  Sodium   Date Value Ref Range Status   04/22/2019 136 136 - 145 mmol/L Final     Potassium   Date Value Ref Range Status   04/22/2019 4.1 3.5 - 5.1 mmol/L Final     Chloride   Date Value Ref Range Status   04/22/2019 97 95 - 110 mmol/L Final     CO2   Date Value Ref Range Status   04/22/2019 29 23 - 29 mmol/L Final     Glucose   Date Value Ref Range Status   04/22/2019 56 (L) 70 - 110 mg/dL Final     BUN, Bld   Date Value Ref Range Status   04/22/2019 14 8 - 23 mg/dL Final     Creatinine   Date Value Ref Range Status   04/22/2019 0.7 0.5 - 1.4 mg/dL Final     Calcium   Date Value Ref Range Status   04/22/2019 9.3 8.7 - 10.5 mg/dL Final     Total Protein   Date Value Ref Range Status   04/22/2019 6.5 6.0 - 8.4 g/dL Final     Albumin   Date Value Ref Range Status   04/22/2019 3.9 3.5 - 5.2 g/dL Final     Total Bilirubin   Date Value Ref Range Status   04/22/2019 0.5 0.1 - 1.0 mg/dL Final     Comment:     For infants and newborns, interpretation of results should be based  on gestational age, weight and in agreement with clinical  observations.  Premature Infant recommended reference ranges:  Up to 24 hours.............<8.0 mg/dL  Up to 48 hours............<12.0 mg/dL  3-5 days..................<15.0 mg/dL  6-29 days.................<15.0 mg/dL       Alkaline Phosphatase   Date Value Ref Range Status   04/22/2019 87 55 - 135 U/L Final     AST   Date Value Ref Range Status   04/22/2019 31 10 - 40 U/L Final     ALT   Date Value Ref Range Status   04/22/2019 18 10 - 44 U/L Final     Anion Gap   Date Value Ref Range Status    04/22/2019 10 8 - 16 mmol/L Final     eGFR if    Date Value Ref Range Status   04/22/2019 >60.0 >60 mL/min/1.73 m^2 Final     eGFR if non    Date Value Ref Range Status   04/22/2019 >60.0 >60 mL/min/1.73 m^2 Final     Comment:     Calculation used to obtain the estimated glomerular filtration  rate (eGFR) is the CKD-EPI equation.              Assessment:       1. DDD (degenerative disc disease), lumbar    2. Adenomatous polyp of colon, unspecified part of colon    3. Obesity (BMI 30-39.9)    4. Fatty liver    5. Hx of colonic polyps        Plan:       1.  Check labs including iron studies given history of anemia  2.  Schedule colonoscopy for surveillance  3.  Further recommendations to follow after above.

## 2020-01-14 ENCOUNTER — TELEPHONE (OUTPATIENT)
Dept: GASTROENTEROLOGY | Facility: CLINIC | Age: 65
End: 2020-01-14

## 2020-01-14 ENCOUNTER — LAB VISIT (OUTPATIENT)
Dept: LAB | Facility: HOSPITAL | Age: 65
End: 2020-01-14
Attending: INTERNAL MEDICINE
Payer: COMMERCIAL

## 2020-01-14 DIAGNOSIS — D64.9 ANEMIA, UNSPECIFIED TYPE: ICD-10-CM

## 2020-01-14 DIAGNOSIS — E66.9 OBESITY (BMI 30-39.9): ICD-10-CM

## 2020-01-14 DIAGNOSIS — M51.36 DDD (DEGENERATIVE DISC DISEASE), LUMBAR: ICD-10-CM

## 2020-01-14 DIAGNOSIS — K76.0 FATTY LIVER: ICD-10-CM

## 2020-01-14 DIAGNOSIS — Z86.010 HX OF COLONIC POLYPS: ICD-10-CM

## 2020-01-14 DIAGNOSIS — D12.6 ADENOMATOUS POLYP OF COLON, UNSPECIFIED PART OF COLON: ICD-10-CM

## 2020-01-14 LAB
ALBUMIN SERPL BCP-MCNC: 4.2 G/DL (ref 3.5–5.2)
ALP SERPL-CCNC: 87 U/L (ref 55–135)
ALT SERPL W/O P-5'-P-CCNC: 13 U/L (ref 10–44)
ANION GAP SERPL CALC-SCNC: 9 MMOL/L (ref 8–16)
AST SERPL-CCNC: 22 U/L (ref 10–40)
BASOPHILS # BLD AUTO: 0.07 K/UL (ref 0–0.2)
BASOPHILS NFR BLD: 1.2 % (ref 0–1.9)
BILIRUB SERPL-MCNC: 0.3 MG/DL (ref 0.1–1)
BUN SERPL-MCNC: 10 MG/DL (ref 8–23)
CALCIUM SERPL-MCNC: 9.3 MG/DL (ref 8.7–10.5)
CHLORIDE SERPL-SCNC: 101 MMOL/L (ref 95–110)
CO2 SERPL-SCNC: 29 MMOL/L (ref 23–29)
CREAT SERPL-MCNC: 0.8 MG/DL (ref 0.5–1.4)
DIFFERENTIAL METHOD: ABNORMAL
EOSINOPHIL # BLD AUTO: 0.1 K/UL (ref 0–0.5)
EOSINOPHIL NFR BLD: 2.5 % (ref 0–8)
ERYTHROCYTE [DISTWIDTH] IN BLOOD BY AUTOMATED COUNT: 12.9 % (ref 11.5–14.5)
EST. GFR  (AFRICAN AMERICAN): >60 ML/MIN/1.73 M^2
EST. GFR  (NON AFRICAN AMERICAN): >60 ML/MIN/1.73 M^2
FERRITIN SERPL-MCNC: 31 NG/ML (ref 20–300)
GLUCOSE SERPL-MCNC: 90 MG/DL (ref 70–110)
HCT VFR BLD AUTO: 43.8 % (ref 40–54)
HGB BLD-MCNC: 14.2 G/DL (ref 14–18)
IMM GRANULOCYTES # BLD AUTO: 0.01 K/UL (ref 0–0.04)
IMM GRANULOCYTES NFR BLD AUTO: 0.2 % (ref 0–0.5)
IRON SERPL-MCNC: 95 UG/DL (ref 45–160)
LYMPHOCYTES # BLD AUTO: 2.1 K/UL (ref 1–4.8)
LYMPHOCYTES NFR BLD: 37.8 % (ref 18–48)
MCH RBC QN AUTO: 32.1 PG (ref 27–31)
MCHC RBC AUTO-ENTMCNC: 32.4 G/DL (ref 32–36)
MCV RBC AUTO: 99 FL (ref 82–98)
MONOCYTES # BLD AUTO: 0.5 K/UL (ref 0.3–1)
MONOCYTES NFR BLD: 9.1 % (ref 4–15)
NEUTROPHILS # BLD AUTO: 2.8 K/UL (ref 1.8–7.7)
NEUTROPHILS NFR BLD: 49.2 % (ref 38–73)
NRBC BLD-RTO: 0 /100 WBC
PLATELET # BLD AUTO: 224 K/UL (ref 150–350)
PMV BLD AUTO: 10.5 FL (ref 9.2–12.9)
POTASSIUM SERPL-SCNC: 4.1 MMOL/L (ref 3.5–5.1)
PROT SERPL-MCNC: 6.8 G/DL (ref 6–8.4)
RBC # BLD AUTO: 4.42 M/UL (ref 4.6–6.2)
SATURATED IRON: 23 % (ref 20–50)
SODIUM SERPL-SCNC: 139 MMOL/L (ref 136–145)
TOTAL IRON BINDING CAPACITY: 419 UG/DL (ref 250–450)
TRANSFERRIN SERPL-MCNC: 283 MG/DL (ref 200–375)
WBC # BLD AUTO: 5.63 K/UL (ref 3.9–12.7)

## 2020-01-14 PROCEDURE — 83540 ASSAY OF IRON: CPT

## 2020-01-14 PROCEDURE — 82728 ASSAY OF FERRITIN: CPT

## 2020-01-14 PROCEDURE — 36415 COLL VENOUS BLD VENIPUNCTURE: CPT | Mod: PO

## 2020-01-14 PROCEDURE — 80053 COMPREHEN METABOLIC PANEL: CPT

## 2020-01-14 PROCEDURE — 85025 COMPLETE CBC W/AUTO DIFF WBC: CPT

## 2020-01-14 NOTE — TELEPHONE ENCOUNTER
----- Message from Atilio Hayes MD sent at 1/14/2020  4:16 PM CST -----  Please advise patient that labs are unremarkable thus far.

## 2020-01-20 ENCOUNTER — ANESTHESIA (OUTPATIENT)
Dept: ENDOSCOPY | Facility: HOSPITAL | Age: 65
End: 2020-01-20
Payer: COMMERCIAL

## 2020-01-20 ENCOUNTER — HOSPITAL ENCOUNTER (OUTPATIENT)
Facility: HOSPITAL | Age: 65
Discharge: HOME | End: 2020-01-20
Attending: INTERNAL MEDICINE | Admitting: INTERNAL MEDICINE
Payer: COMMERCIAL

## 2020-01-20 ENCOUNTER — ANESTHESIA EVENT (OUTPATIENT)
Dept: ENDOSCOPY | Facility: HOSPITAL | Age: 65
End: 2020-01-20
Payer: COMMERCIAL

## 2020-01-20 DIAGNOSIS — K64.8 INTERNAL HEMORRHOIDS: Primary | ICD-10-CM

## 2020-01-20 DIAGNOSIS — Z86.010 HISTORY OF COLON POLYPS: ICD-10-CM

## 2020-01-20 DIAGNOSIS — K57.90 DIVERTICULOSIS: ICD-10-CM

## 2020-01-20 PROBLEM — Z86.0100 HISTORY OF COLON POLYPS: Status: ACTIVE | Noted: 2020-01-20

## 2020-01-20 PROCEDURE — D9220A PRA ANESTHESIA: Mod: CRNA,,, | Performed by: NURSE ANESTHETIST, CERTIFIED REGISTERED

## 2020-01-20 PROCEDURE — 37000009 HC ANESTHESIA EA ADD 15 MINS: Performed by: INTERNAL MEDICINE

## 2020-01-20 PROCEDURE — G0105 COLORECTAL SCRN; HI RISK IND: HCPCS | Mod: ,,, | Performed by: INTERNAL MEDICINE

## 2020-01-20 PROCEDURE — D9220A PRA ANESTHESIA: ICD-10-PCS | Mod: CRNA,,, | Performed by: NURSE ANESTHETIST, CERTIFIED REGISTERED

## 2020-01-20 PROCEDURE — D9220A PRA ANESTHESIA: ICD-10-PCS | Mod: ANES,,, | Performed by: ANESTHESIOLOGY

## 2020-01-20 PROCEDURE — G0105 COLORECTAL SCRN; HI RISK IND: ICD-10-PCS | Mod: ,,, | Performed by: INTERNAL MEDICINE

## 2020-01-20 PROCEDURE — G0105 COLORECTAL SCRN; HI RISK IND: HCPCS | Performed by: INTERNAL MEDICINE

## 2020-01-20 PROCEDURE — 37000008 HC ANESTHESIA 1ST 15 MINUTES: Performed by: INTERNAL MEDICINE

## 2020-01-20 PROCEDURE — 63600175 PHARM REV CODE 636 W HCPCS: Performed by: NURSE ANESTHETIST, CERTIFIED REGISTERED

## 2020-01-20 PROCEDURE — D9220A PRA ANESTHESIA: Mod: ANES,,, | Performed by: ANESTHESIOLOGY

## 2020-01-20 PROCEDURE — 63600175 PHARM REV CODE 636 W HCPCS: Performed by: INTERNAL MEDICINE

## 2020-01-20 RX ORDER — PROPOFOL 10 MG/ML
VIAL (ML) INTRAVENOUS
Status: DISCONTINUED | OUTPATIENT
Start: 2020-01-20 | End: 2020-01-20

## 2020-01-20 RX ORDER — LIDOCAINE HCL/PF 100 MG/5ML
SYRINGE (ML) INTRAVENOUS
Status: DISCONTINUED | OUTPATIENT
Start: 2020-01-20 | End: 2020-01-20

## 2020-01-20 RX ORDER — SODIUM CHLORIDE 9 MG/ML
INJECTION, SOLUTION INTRAVENOUS CONTINUOUS
Status: DISCONTINUED | OUTPATIENT
Start: 2020-01-20 | End: 2020-01-20 | Stop reason: HOSPADM

## 2020-01-20 RX ADMIN — PROPOFOL 50 MG: 10 INJECTION, EMULSION INTRAVENOUS at 11:01

## 2020-01-20 RX ADMIN — PROPOFOL 60 MG: 10 INJECTION, EMULSION INTRAVENOUS at 11:01

## 2020-01-20 RX ADMIN — LIDOCAINE HYDROCHLORIDE 100 MG: 20 INJECTION, SOLUTION INTRAVENOUS at 11:01

## 2020-01-20 RX ADMIN — SODIUM CHLORIDE 1000 ML: 0.9 INJECTION, SOLUTION INTRAVENOUS at 10:01

## 2020-01-20 RX ADMIN — PROPOFOL 150 MG: 10 INJECTION, EMULSION INTRAVENOUS at 11:01

## 2020-01-20 NOTE — [employer] PATIENT INSTRUCTIONS
Discharge Summary/Instructions after an Endoscopic Procedure  Patient Name: Mal Larkin  Patient MRN: [id number]  Patient YOB: 1955 Monday, January 20, 2020  Atilio Rosenberg MD  RESTRICTIONS:  During your procedure today, you received medications for sedation.  These   medications may affect your judgment, balance and coordination.  Therefore,   for 24 hours, you have the following restrictions:   - DO NOT drive a car, operate machinery, make legal/financial decisions,   sign important papers or drink alcohol.    ACTIVITY:  Today: no heavy lifting, straining or running due to procedural   sedation/anesthesia.  The following day: return to full activity including work.  DIET:  Eat and drink normally unless instructed otherwise.     TREATMENT FOR COMMON SIDE EFFECTS:  - Mild abdominal pain, nausea, belching, bloating or excessive gas:  rest,   eat lightly and use a heating pad.  - Sore Throat: treat with throat lozenges and/or gargle with warm salt   water.  - Because air was used during the procedure, expelling large amounts of air   from your rectum or belching is normal.  - If a bowel prep was taken, you may not have a bowel movement for 1-3 days.    This is normal.  SYMPTOMS TO WATCH FOR AND REPORT TO YOUR PHYSICIAN:  1. Abdominal pain or bloating, other than gas cramps.  2. Chest pain.  3. Back pain.  4. Signs of infection such as: chills or fever occurring within 24 hours   after the procedure.  5. Rectal bleeding, which would show as bright red, maroon, or black stools.   (A tablespoon of blood from the rectum is not serious, especially if   hemorrhoids are present.)  6. Vomiting.  7. Weakness or dizziness.  GO DIRECTLY TO THE NEAREST EMERGENCY ROOM IF YOU HAVE ANY OF THE FOLLOWING:      Difficulty breathing              Chills and/or fever over 101 F   Persistent vomiting and/or vomiting blood   Severe abdominal pain   Severe chest pain   Black, tarry stools   Bleeding- more than one  tablespoon   Any other symptom or condition that you feel may need urgent attention  Your doctor recommends these additional instructions:  If any biopsies were taken, your doctors clinic will contact you in 1 to 2   weeks with any results.  - Patient has a contact number available for emergencies.  The signs and   symptoms of potential delayed complications were discussed with the   patient.  Return to normal activities tomorrow.  Written discharge   instructions were provided to the patient.   - High fiber diet.   - Continue present medications.   - Repeat colonoscopy in 5 years for surveillance.   - Discharge patient to home (ambulatory).   - Return to my office PRN.  For questions, problems or results please call your physician - Atilio Rosenberg MD at Work:  [phone].  [facility], EMERGENCY ROOM PHONE NUMBER: [phone]  IF A COMPLICATION OR EMERGENCY SITUATION ARISES AND YOU ARE UNABLE TO REACH   YOUR PHYSICIAN - GO DIRECTLY TO THE EMERGENCY ROOM.  Atilio Rosenberg MD  1/20/2020 11:47:54 AM  This report has been verified and signed electronically.  [employer]

## 2020-01-20 NOTE — ANESTHESIA POSTPROCEDURE EVALUATION
Anesthesia Post Evaluation    Patient: Mal Larkin    Procedure(s) Performed: Procedure(s) (LRB):  COLONOSCOPY (N/A)    Final Anesthesia Type: general    Patient location during evaluation: PACU  Patient participation: Yes- Able to Participate  Level of consciousness: awake and alert  Post-procedure vital signs: reviewed and stable  Pain management: adequate  Airway patency: patent    PONV status at discharge: No PONV  Anesthetic complications: no      Cardiovascular status: hemodynamically stable  Respiratory status: unassisted and room air  Hydration status: euvolemic  Follow-up not needed.          Vitals Value Taken Time   /85 1/20/2020 12:12 PM   Temp 36.5 °C (97.7 °F) 1/20/2020 12:12 PM   Pulse 54 1/20/2020 12:12 PM   Resp 20 1/20/2020 12:12 PM   SpO2 100 % 1/20/2020 12:12 PM         Event Time     Out of Recovery 12:17:00          Pain/Taylor Score: Taylor Score: 10 (1/20/2020 12:12 PM)

## 2020-01-20 NOTE — ANESTHESIA PREPROCEDURE EVALUATION
01/20/2020  Mal Larkin is a 64 y.o., male.    Pre-op Assessment    I have reviewed the Patient Summary Reports.     I have reviewed the Nursing Notes.   I have reviewed the Medications.     Review of Systems  Anesthesia Hx:  No problems with previous Anesthesia    Social:  Former Smoker, Social Alcohol Use    Hepatic/GI:   Bowel Prep. Liver Disease,    Musculoskeletal:   Arthritis         Physical Exam  General:  Well nourished    Airway/Jaw/Neck:  Airway Findings: Mallampati: II        Dental:  Dental Findings: In tact   Chest/Lungs:  Chest/Lungs Findings: Clear to auscultation, Normal Respiratory Rate     Heart/Vascular:  Heart Findings: Rate: Normal  Rhythm: Regular Rhythm             Anesthesia Plan  Type of Anesthesia, risks & benefits discussed:  Anesthesia Type:  general  Patient's Preference:   Intra-op Monitoring Plan: standard ASA monitors  Intra-op Monitoring Plan Comments:   Post Op Pain Control Plan:   Post Op Pain Control Plan Comments:   Induction:   IV  Beta Blocker:  Patient is not currently on a Beta-Blocker (No further documentation required).       Informed Consent: Patient understands risks and agrees with Anesthesia plan.  Questions answered. Anesthesia consent signed with patient.  ASA Score: 2     Day of Surgery Review of History & Physical: I have interviewed and examined the patient. I have reviewed the patient's H&P dated:    H&P update referred to the provider.         Ready For Surgery From Anesthesia Perspective.

## 2020-01-20 NOTE — DISCHARGE INSTRUCTIONS

## 2020-01-20 NOTE — TRANSFER OF CARE
"Anesthesia Transfer of Care Note    Patient: Mal Larkin    Procedure(s) Performed: Procedure(s) (LRB):  COLONOSCOPY (N/A)    Patient location: PACU    Anesthesia Type: general    Transport from OR: Transported from OR on 2-3 L/min O2 by NC with adequate spontaneous ventilation    Post pain: adequate analgesia    Post assessment: no apparent anesthetic complications and tolerated procedure well    Post vital signs: stable    Level of consciousness: awake, alert and oriented    Nausea/Vomiting: no nausea/vomiting    Complications: none    Transfer of care protocol was followed      Last vitals:   Visit Vitals  /62   Pulse (!) 53   Temp 36.4 °C (97.5 °F)   Resp 18   Ht 5' 8" (1.727 m)   Wt 75.8 kg (167 lb)   SpO2 100%   BMI 25.39 kg/m²     "

## 2020-01-21 VITALS
HEIGHT: 68 IN | WEIGHT: 167 LBS | BODY MASS INDEX: 25.31 KG/M2 | HEART RATE: 54 BPM | TEMPERATURE: 98 F | SYSTOLIC BLOOD PRESSURE: 143 MMHG | DIASTOLIC BLOOD PRESSURE: 85 MMHG | OXYGEN SATURATION: 100 % | RESPIRATION RATE: 20 BRPM

## 2020-01-25 ENCOUNTER — PATIENT MESSAGE (OUTPATIENT)
Dept: ADMINISTRATIVE | Facility: OTHER | Age: 65
End: 2020-01-25

## 2020-02-04 ENCOUNTER — PATIENT OUTREACH (OUTPATIENT)
Dept: OTHER | Facility: OTHER | Age: 65
End: 2020-02-04

## 2020-02-04 NOTE — PROGRESS NOTES
Digital Medicine: Clinician Follow-Up    Called patient for digital medicine follow-up. Patient is doing well overall, but says that he is increasingly frustrated with his cuff. He has yet to take his device to the O Bar and plans to do so soon. Questioned if the location needs to be turned on for the device to work and I confirmed with IT that it does not. Patient's BP was s;ightly elevated at last office visit but. He feels that it is not an accurate reflection of what his blood pressure runs on another home device.     The history is provided by the patient. No  was used.     Follow Up  Follow-up reason(s): reading review              Assessment:  Patient's current 30-day average is not at goal of <130/80 mmHg.       Plan:  Continue lifestyle modifications.  Encouraged patient to have device assessed at next appointment or the mobile O Bar.   Patients health , Autumn Urias, will follow-up as scheduled.    I will continue to monitor regularly and will follow-up in 2-3 months, sooner if blood pressure begins to trend upward or downward.     Patient has my contact information and knows to call with any concerns or clinical changes.            There are no preventive care reminders to display for this patient.    Last 5 Patient Entered Readings                                      Current 30 Day Average: 141/83     Recent Readings 2/4/2020 2/4/2020 2/4/2020 1/27/2020 1/27/2020    SBP (mmHg) 131 143 144 157 149    DBP (mmHg) 75 81 77 91 91    Pulse 60 59 60 90 94                        Screenings

## 2020-02-11 ENCOUNTER — PATIENT OUTREACH (OUTPATIENT)
Dept: OTHER | Facility: OTHER | Age: 65
End: 2020-02-11

## 2020-03-13 NOTE — PROGRESS NOTES
Digital Medicine: Health  Follow-Up    Called patient for health  follow up.  Patient states that he is feeling well. He denies any symptoms of hyper or hypotension.    The history is provided by the patient. No  was used.     Follow Up  Follow-up reason(s): reading review          INTERVENTION(S)  encouragement/support    PLAN  continue monitoring      There are no preventive care reminders to display for this patient.    Last 5 Patient Entered Readings                                      Current 30 Day Average: 129/77     Recent Readings 3/8/2020 3/6/2020 2/28/2020 2/20/2020 2/16/2020    SBP (mmHg) 131 125 123 132 130    DBP (mmHg) 80 72 74 78 77    Pulse 58 51 53 56 52                      Diet Screening   Patient reports eating or drinking the following: fruit, fresh vegetables, caffeine, restaurant food and lean proteins, whole grains, low fat dairyHe has the following dietary restrictions: low sodium dietHe cooks for self and has meals prepared by family.    Patient does the shopping for groceries and lets family grocery shop.  He gets groceries from the grocery store.      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  He says that he has probably gained a couple of pounds due to eating food that is richer than what he usually eats.  He says that he is going back to his normal diet this week.    Assigning the following patient goals: maintain low sodium diet    Physical Activity Screening   When asked if exercising, patient responded: yes    Patient participates in the following activities: biking and walking    He identified the following barriers to physical activity: lack of time    Patient says his activity was affected by his work schedule the past couple of weeks, but he will increase activity starting this weekend.      SDOH

## 2020-04-15 ENCOUNTER — PATIENT MESSAGE (OUTPATIENT)
Dept: DERMATOLOGY | Facility: CLINIC | Age: 65
End: 2020-04-15

## 2020-05-14 ENCOUNTER — PATIENT OUTREACH (OUTPATIENT)
Dept: OTHER | Facility: OTHER | Age: 65
End: 2020-05-14

## 2020-05-29 NOTE — PROGRESS NOTES
Digital Medicine: Health  Follow-Up    Patient sent the following message via [employer]:    15 miles a day on my bike. 9 holes of golf, walking.  My diet is mostly protein, minimum 80 grams a day, as per my bariatric surgeon.     The history is provided by the patient. No  was used.     Follow Up  Follow-up reason(s): reading review      Readings are trending down           PLAN  continue monitoring          Topic    Lipid (Cholesterol) Test        Last 5 Patient Entered Readings                                      Current 30 Day Average: 122/71     Recent Readings 5/27/2020 5/19/2020 5/11/2020 5/7/2020 4/28/2020    SBP (mmHg) 112 121 123 130 129    DBP (mmHg) 65 69 73 75 78    Pulse 50 52 60 51 54                      Diet Screening   Patient reports eating or drinking the following: protein    Physical Activity Screening   When asked if exercising, patient responded: yes    Patient participates in the following activities: biking, walking and outdoor activities      SDOH

## 2020-06-11 ENCOUNTER — PATIENT OUTREACH (OUTPATIENT)
Dept: ADMINISTRATIVE | Facility: OTHER | Age: 65
End: 2020-06-11

## 2020-06-15 ENCOUNTER — OFFICE VISIT (OUTPATIENT)
Dept: DERMATOLOGY | Facility: CLINIC | Age: 65
End: 2020-06-15
Payer: COMMERCIAL

## 2020-06-15 DIAGNOSIS — D22.9 MULTIPLE BENIGN NEVI: ICD-10-CM

## 2020-06-15 DIAGNOSIS — L82.0 INFLAMED SEBORRHEIC KERATOSIS: ICD-10-CM

## 2020-06-15 DIAGNOSIS — L82.1 SEBORRHEIC KERATOSES: ICD-10-CM

## 2020-06-15 DIAGNOSIS — Z86.006 HISTORY OF MELANOMA IN SITU: ICD-10-CM

## 2020-06-15 DIAGNOSIS — L57.0 ACTINIC KERATOSES: Primary | ICD-10-CM

## 2020-06-15 DIAGNOSIS — L81.4 SOLAR LENTIGO: ICD-10-CM

## 2020-06-15 PROCEDURE — 17000 PR DESTRUCTION(LASER SURGERY,CRYOSURGERY,CHEMOSURGERY),PREMALIGNANT LESIONS,FIRST LESION: ICD-10-PCS | Mod: 59,S$GLB,, | Performed by: DERMATOLOGY

## 2020-06-15 PROCEDURE — 99214 OFFICE O/P EST MOD 30 MIN: CPT | Mod: 25,S$GLB,, | Performed by: DERMATOLOGY

## 2020-06-15 PROCEDURE — 99214 PR OFFICE/OUTPT VISIT, EST, LEVL IV, 30-39 MIN: ICD-10-PCS | Mod: 25,S$GLB,, | Performed by: DERMATOLOGY

## 2020-06-15 PROCEDURE — 99999 PR PBB SHADOW E&M-EST. PATIENT-LVL III: CPT | Mod: PBBFAC,,, | Performed by: DERMATOLOGY

## 2020-06-15 PROCEDURE — 17000 DESTRUCT PREMALG LESION: CPT | Mod: 59,S$GLB,, | Performed by: DERMATOLOGY

## 2020-06-15 PROCEDURE — 99999 PR PBB SHADOW E&M-EST. PATIENT-LVL III: ICD-10-PCS | Mod: PBBFAC,,, | Performed by: DERMATOLOGY

## 2020-06-15 PROCEDURE — 17110 PR DESTRUCTION BENIGN LESIONS UP TO 14: ICD-10-PCS | Mod: S$GLB,,, | Performed by: DERMATOLOGY

## 2020-06-15 PROCEDURE — 17110 DESTRUCTION B9 LES UP TO 14: CPT | Mod: S$GLB,,, | Performed by: DERMATOLOGY

## 2020-06-15 NOTE — PATIENT INSTRUCTIONS

## 2020-06-15 NOTE — PROGRESS NOTES
Subjective:       Patient ID:  Mal Larkin is a 64 y.o. male who presents for   Chief Complaint   Patient presents with    Skin Check     LOV: 04- TBSE. Benign - HX of MM     64 y.o. male who presents for a TBSE. Has a spot on his abdomen and shoulders he would like checked. Present for years (He is unsure of exactly how long).     H/o AKs treated with cryo  H/o MIS upper back 2011  + FH melanoma in father and both sisters     Rides bike 15 miles per day, uses sun protection      Review of Systems   Constitutional: Positive for weight loss (bariatric surgery 5/2018 lost 135lbs). Negative for fever, chills, fatigue, night sweats and malaise.   HENT: Negative for headaches.    Respiratory: Negative for cough and shortness of breath.    Gastrointestinal: Negative for indigestion.   Skin: Positive for activity-related sunscreen use and wears hat. Negative for daily sunscreen use and recent sunburn.   Neurological: Negative for headaches.   Hematologic/Lymphatic: Negative for adenopathy. Bruises/bleeds easily.       Past Medical History:   Diagnosis Date    Adenomatous colon polyp     + dysplasia    Colon polyp     Fatty liver     Melanoma 2011    in situ - upper back         Objective:    Physical Exam   Constitutional: He appears well-developed and well-nourished. He is obese.  No distress.   Neurological: He is alert and oriented to person, place, and time. He is not disoriented.   Psychiatric: He has a normal mood and affect.   Skin:   Areas Examined (abnormalities noted in diagram):   Scalp / Hair Palpated and Inspected  Head / Face Inspection Performed  Neck Inspection Performed  Chest / Axilla Inspection Performed  Abdomen Inspection Performed  Genitals / Buttocks / Groin Inspection Performed  Back Inspection Performed  RUE Inspected  LUE Inspection Performed  RLE Inspected  LLE Inspection Performed  Nails and Digits Inspection Performed                       Diagram Legend     Erythematous  scaling macule/papule c/w actinic keratosis       Vascular papule c/w angioma      Pigmented verrucoid papule/plaque c/w seborrheic keratosis      Yellow umbilicated papule c/w sebaceous hyperplasia      Irregularly shaped tan macule c/w lentigo     1-2 mm smooth white papules consistent with Milia      Movable subcutaneous cyst with punctum c/w epidermal inclusion cyst      Subcutaneous movable cyst c/w pilar cyst      Firm pink to brown papule c/w dermatofibroma      Pedunculated fleshy papule(s) c/w skin tag(s)      Evenly pigmented macule c/w junctional nevus     Mildly variegated pigmented, slightly irregular-bordered macule c/w mildly atypical nevus      Flesh colored to evenly pigmented papule c/w intradermal nevus       Pink pearly papule/plaque c/w basal cell carcinoma      Erythematous hyperkeratotic cursted plaque c/w SCC      Surgical scar with no sign of skin cancer recurrence      Open and closed comedones      Inflammatory papules and pustules      Verrucoid papule consistent consistent with wart     Erythematous eczematous patches and plaques     Dystrophic onycholytic nail with subungual debris c/w onychomycosis     Umbilicated papule    Erythematous-base heme-crusted tan verrucoid plaque consistent with inflamed seborrheic keratosis     Erythematous Silvery Scaling Plaque c/w Psoriasis     See annotation      Assessment / Plan:        Actinic keratoses  Cryosurgery Procedure Note    Verbal consent from the patient is obtained and the patient is aware of the precancerous quality and need for treatment of these lesions. Liquid nitrogen cryosurgery is applied to the 1 actinic keratoses, as detailed in the physical exam, to produce a freeze injury. The patient is aware that blisters may form and is instructed on wound care with gentle cleansing and use of vaseline ointment to keep moist until healed. The patient is supplied a handout on cryosurgery and is instructed to call if lesions do not completely  resolve.      History of melanoma in situ  Area of previous melanoma (left upper back) MIS,  examined. Site well healed with no signs of recurrence.    Total body skin examination performed today including at least 12 points as noted in physical examination. No lesions suspicious for malignancy noted.    Multiple benign nevi  Discussed ABCDE's of nevi.  Monitor for new mole or moles that are becoming bigger, darker, irritated, or developing irregular borders. Brochure provided.    Inflamed seborrheic keratosis, R scalp, bleeds with shaving  Verbal consent obtained. 1 lesions removed with shallow shave removal after anesthesia with 1% lidocaine with epinephrine. Hemostasis achieved with aluminum chloride and hyfrecation. No complications.    Solar lentigo  This is a benign hyperpigmented sun induced lesion. Daily sun protection will reduce the number of new lesions. Treatment of these benign lesions are considered cosmetic.    Seborrheic keratoses, multiple, scalp  These are benign inherited growths without a malignant potential. Reassurance given to patient. No treatment is necessary.     Patient instructed in importance in daily sun protection of at least spf 30. Mineral sunscreen ingredients preferred (Zinc +/- Titanium).   Recommend Elta MD for daily use on face and neck.  Patient encouraged to wear hat for all outdoor exposure.   Also discussed sun avoidance and use of protective clothing.           Follow up in about 6 months (around 12/15/2020).

## 2020-07-07 ENCOUNTER — PATIENT MESSAGE (OUTPATIENT)
Dept: ADMINISTRATIVE | Facility: HOSPITAL | Age: 65
End: 2020-07-07

## 2020-07-14 NOTE — TELEPHONE ENCOUNTER
Please see [employer] message.  Patient would like to schedule an appointment for his physical sooner that next available in October.

## 2020-08-05 ENCOUNTER — PATIENT OUTREACH (OUTPATIENT)
Dept: OTHER | Facility: OTHER | Age: 65
End: 2020-08-05

## 2020-09-10 NOTE — PROGRESS NOTES
Digital Medicine: Health  Follow-Up    The history is provided by the patient.             Reason for review: Blood pressure at goal        Topics Covered on Call: physical activity    Additional Follow-up details: Patient states he is feeling good and happy with his BP readings.        Diet-Not assessed          Physical Activity-no change to routine  No change to exercise routine.   7 day(s) a week.     Additional physical activity details: Patient says he continues to bike ride, go to the gym, and play golf as long as weather permits.      Medication Adherence-Medication Adherence not addressed.      Substance, Sleep, Stress-Not assessed      Continue current diet/physical activity routine.  Provided patient education.       Addressed any questions or concerns and patient has my contact information if needed prior to next outreach. Patient verbalizes understanding.      Explained the importance of self-monitoring and medication adherence. Encouraged the patient to communicate with their health  for lifestyle modifications to help improve or maintain a healthy lifestyle.                Topic    Lipid (Cholesterol) Test        Last 5 Patient Entered Readings                                      Current 30 Day Average: 128/75     Recent Readings 9/4/2020 8/30/2020 8/26/2020 8/18/2020 7/29/2020    SBP (mmHg) 133 128 127 124 123    DBP (mmHg) 74 75 77 75 71    Pulse 60 49 52 50 49               
Alert and oriented x 3, normal mood and affect, no apparent risk to self or others.

## 2020-10-05 ENCOUNTER — PATIENT MESSAGE (OUTPATIENT)
Dept: ADMINISTRATIVE | Facility: HOSPITAL | Age: 65
End: 2020-10-05

## 2020-10-28 ENCOUNTER — OFFICE VISIT (OUTPATIENT)
Dept: FAMILY MEDICINE | Facility: CLINIC | Age: 65
End: 2020-10-28
Attending: FAMILY MEDICINE
Payer: MEDICARE

## 2020-10-28 VITALS
HEART RATE: 54 BPM | SYSTOLIC BLOOD PRESSURE: 132 MMHG | OXYGEN SATURATION: 99 % | HEIGHT: 68 IN | TEMPERATURE: 98 F | BODY MASS INDEX: 27.9 KG/M2 | DIASTOLIC BLOOD PRESSURE: 80 MMHG | WEIGHT: 184.06 LBS

## 2020-10-28 DIAGNOSIS — Z98.84 HISTORY OF BARIATRIC SURGERY: ICD-10-CM

## 2020-10-28 DIAGNOSIS — Z00.00 ENCOUNTER FOR PREVENTIVE HEALTH EXAMINATION: Primary | ICD-10-CM

## 2020-10-28 DIAGNOSIS — E46 PROTEIN-CALORIE MALNUTRITION, UNSPECIFIED SEVERITY: ICD-10-CM

## 2020-10-28 DIAGNOSIS — D12.6 ADENOMATOUS POLYP OF COLON, UNSPECIFIED PART OF COLON: ICD-10-CM

## 2020-10-28 DIAGNOSIS — Z12.5 PROSTATE CANCER SCREENING: ICD-10-CM

## 2020-10-28 DIAGNOSIS — M51.36 DDD (DEGENERATIVE DISC DISEASE), LUMBAR: ICD-10-CM

## 2020-10-28 DIAGNOSIS — M72.0 DUPUYTREN'S CONTRACTURE OF BOTH HANDS: ICD-10-CM

## 2020-10-28 DIAGNOSIS — Z13.6 ENCOUNTER FOR ABDOMINAL AORTIC ANEURYSM (AAA) SCREENING: ICD-10-CM

## 2020-10-28 DIAGNOSIS — K76.0 FATTY LIVER: ICD-10-CM

## 2020-10-28 DIAGNOSIS — Z98.890 HISTORY OF LUMBAR LAMINECTOMY: ICD-10-CM

## 2020-10-28 DIAGNOSIS — Z85.820 PERSONAL HISTORY OF MALIGNANT MELANOMA OF SKIN: ICD-10-CM

## 2020-10-28 DIAGNOSIS — Z91.89 PNEUMOCOCCAL VACCINATION INDICATED: ICD-10-CM

## 2020-10-28 PROCEDURE — 99397 PR PREVENTIVE VISIT,EST,65 & OVER: ICD-10-PCS | Mod: HCNC,S$GLB,, | Performed by: FAMILY MEDICINE

## 2020-10-28 PROCEDURE — 99499 UNLISTED E&M SERVICE: CPT | Mod: S$GLB,,, | Performed by: FAMILY MEDICINE

## 2020-10-28 PROCEDURE — 3079F DIAST BP 80-89 MM HG: CPT | Mod: HCNC,CPTII,S$GLB, | Performed by: FAMILY MEDICINE

## 2020-10-28 PROCEDURE — 99999 PR PBB SHADOW E&M-EST. PATIENT-LVL IV: ICD-10-PCS | Mod: PBBFAC,HCNC,, | Performed by: FAMILY MEDICINE

## 2020-10-28 PROCEDURE — 99499 RISK ADDL DX/OHS AUDIT: ICD-10-PCS | Mod: S$GLB,,, | Performed by: FAMILY MEDICINE

## 2020-10-28 PROCEDURE — 99999 PR PBB SHADOW E&M-EST. PATIENT-LVL IV: CPT | Mod: PBBFAC,HCNC,, | Performed by: FAMILY MEDICINE

## 2020-10-28 PROCEDURE — 3075F PR MOST RECENT SYSTOLIC BLOOD PRESS GE 130-139MM HG: ICD-10-PCS | Mod: HCNC,CPTII,S$GLB, | Performed by: FAMILY MEDICINE

## 2020-10-28 PROCEDURE — 3075F SYST BP GE 130 - 139MM HG: CPT | Mod: HCNC,CPTII,S$GLB, | Performed by: FAMILY MEDICINE

## 2020-10-28 PROCEDURE — 3079F PR MOST RECENT DIASTOLIC BLOOD PRESSURE 80-89 MM HG: ICD-10-PCS | Mod: HCNC,CPTII,S$GLB, | Performed by: FAMILY MEDICINE

## 2020-10-28 PROCEDURE — 99397 PER PM REEVAL EST PAT 65+ YR: CPT | Mod: HCNC,S$GLB,, | Performed by: FAMILY MEDICINE
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
## 2020-10-29 ENCOUNTER — LAB VISIT (OUTPATIENT)
Dept: LAB | Facility: HOSPITAL | Age: 65
End: 2020-10-29
Attending: FAMILY MEDICINE
Payer: MEDICARE

## 2020-10-29 DIAGNOSIS — Z12.5 PROSTATE CANCER SCREENING: ICD-10-CM

## 2020-10-29 DIAGNOSIS — Z00.00 ENCOUNTER FOR PREVENTIVE HEALTH EXAMINATION: ICD-10-CM

## 2020-10-29 DIAGNOSIS — E46 PROTEIN-CALORIE MALNUTRITION, UNSPECIFIED SEVERITY: ICD-10-CM

## 2020-10-29 DIAGNOSIS — Z98.84 HISTORY OF BARIATRIC SURGERY: ICD-10-CM

## 2020-10-29 DIAGNOSIS — K76.0 FATTY LIVER: ICD-10-CM

## 2020-10-29 LAB
25(OH)D3+25(OH)D2 SERPL-MCNC: 39 NG/ML (ref 30–96)
ALBUMIN SERPL BCP-MCNC: 4.3 G/DL (ref 3.5–5.2)
ALP SERPL-CCNC: 80 U/L (ref 55–135)
ALT SERPL W/O P-5'-P-CCNC: 23 U/L (ref 10–44)
ANION GAP SERPL CALC-SCNC: 13 MMOL/L (ref 8–16)
AST SERPL-CCNC: 29 U/L (ref 10–40)
BASOPHILS # BLD AUTO: 0.05 K/UL (ref 0–0.2)
BASOPHILS NFR BLD: 1.1 % (ref 0–1.9)
BILIRUB SERPL-MCNC: 0.6 MG/DL (ref 0.1–1)
BUN SERPL-MCNC: 11 MG/DL (ref 8–23)
CALCIUM SERPL-MCNC: 9.4 MG/DL (ref 8.7–10.5)
CHLORIDE SERPL-SCNC: 103 MMOL/L (ref 95–110)
CHOLEST SERPL-MCNC: 208 MG/DL (ref 120–199)
CHOLEST/HDLC SERPL: 1.8 {RATIO} (ref 2–5)
CO2 SERPL-SCNC: 24 MMOL/L (ref 23–29)
COMPLEXED PSA SERPL-MCNC: 0.49 NG/ML (ref 0–4)
CREAT SERPL-MCNC: 0.8 MG/DL (ref 0.5–1.4)
DIFFERENTIAL METHOD: ABNORMAL
EOSINOPHIL # BLD AUTO: 0.1 K/UL (ref 0–0.5)
EOSINOPHIL NFR BLD: 2 % (ref 0–8)
ERYTHROCYTE [DISTWIDTH] IN BLOOD BY AUTOMATED COUNT: 12.4 % (ref 11.5–14.5)
EST. GFR  (AFRICAN AMERICAN): >60 ML/MIN/1.73 M^2
EST. GFR  (NON AFRICAN AMERICAN): >60 ML/MIN/1.73 M^2
FOLATE SERPL-MCNC: 13.9 NG/ML (ref 4–24)
GLUCOSE SERPL-MCNC: 100 MG/DL (ref 70–110)
HCT VFR BLD AUTO: 44.6 % (ref 40–54)
HDLC SERPL-MCNC: 113 MG/DL (ref 40–75)
HDLC SERPL: 54.3 % (ref 20–50)
HGB BLD-MCNC: 14.7 G/DL (ref 14–18)
IMM GRANULOCYTES # BLD AUTO: 0.01 K/UL (ref 0–0.04)
IMM GRANULOCYTES NFR BLD AUTO: 0.2 % (ref 0–0.5)
LDLC SERPL CALC-MCNC: 84.8 MG/DL (ref 63–159)
LYMPHOCYTES # BLD AUTO: 1.6 K/UL (ref 1–4.8)
LYMPHOCYTES NFR BLD: 36.3 % (ref 18–48)
MCH RBC QN AUTO: 32.7 PG (ref 27–31)
MCHC RBC AUTO-ENTMCNC: 33 G/DL (ref 32–36)
MCV RBC AUTO: 99 FL (ref 82–98)
MONOCYTES # BLD AUTO: 0.4 K/UL (ref 0.3–1)
MONOCYTES NFR BLD: 9.1 % (ref 4–15)
NEUTROPHILS # BLD AUTO: 2.3 K/UL (ref 1.8–7.7)
NEUTROPHILS NFR BLD: 51.3 % (ref 38–73)
NONHDLC SERPL-MCNC: 95 MG/DL
NRBC BLD-RTO: 0 /100 WBC
PLATELET # BLD AUTO: 211 K/UL (ref 150–350)
PMV BLD AUTO: 10.3 FL (ref 9.2–12.9)
POTASSIUM SERPL-SCNC: 4.3 MMOL/L (ref 3.5–5.1)
PROT SERPL-MCNC: 7.2 G/DL (ref 6–8.4)
RBC # BLD AUTO: 4.5 M/UL (ref 4.6–6.2)
SODIUM SERPL-SCNC: 140 MMOL/L (ref 136–145)
TRIGL SERPL-MCNC: 51 MG/DL (ref 30–150)
VIT B12 SERPL-MCNC: 962 PG/ML (ref 210–950)
WBC # BLD AUTO: 4.49 K/UL (ref 3.9–12.7)

## 2020-10-29 PROCEDURE — 82306 VITAMIN D 25 HYDROXY: CPT | Mod: HCNC

## 2020-10-29 PROCEDURE — 82607 VITAMIN B-12: CPT | Mod: HCNC

## 2020-10-29 PROCEDURE — 80061 LIPID PANEL: CPT | Mod: HCNC

## 2020-10-29 PROCEDURE — 84425 ASSAY OF VITAMIN B-1: CPT | Mod: HCNC

## 2020-10-29 PROCEDURE — 36415 COLL VENOUS BLD VENIPUNCTURE: CPT | Mod: HCNC,PO

## 2020-10-29 PROCEDURE — 84590 ASSAY OF VITAMIN A: CPT | Mod: HCNC

## 2020-10-29 PROCEDURE — 85025 COMPLETE CBC W/AUTO DIFF WBC: CPT | Mod: HCNC

## 2020-10-29 PROCEDURE — 80053 COMPREHEN METABOLIC PANEL: CPT | Mod: HCNC

## 2020-10-29 PROCEDURE — 82746 ASSAY OF FOLIC ACID SERUM: CPT | Mod: HCNC

## 2020-10-29 PROCEDURE — 84153 ASSAY OF PSA TOTAL: CPT | Mod: HCNC

## 2020-10-30 ENCOUNTER — HOSPITAL ENCOUNTER (OUTPATIENT)
Dept: RADIOLOGY | Facility: HOSPITAL | Age: 65
Discharge: HOME | End: 2020-10-30
Attending: FAMILY MEDICINE
Payer: MEDICARE

## 2020-10-30 DIAGNOSIS — Z13.6 ENCOUNTER FOR ABDOMINAL AORTIC ANEURYSM (AAA) SCREENING: ICD-10-CM

## 2020-10-30 PROCEDURE — 76775 US EXAM ABDO BACK WALL LIM: CPT | Mod: TC,HCNC

## 2020-10-30 PROCEDURE — 76775 US ABDOMINAL AORTA: ICD-10-PCS | Mod: 26,HCNC,, | Performed by: RADIOLOGY

## 2020-10-30 PROCEDURE — 76775 US EXAM ABDO BACK WALL LIM: CPT | Mod: 26,HCNC,, | Performed by: RADIOLOGY

## 2020-11-03 LAB
VIT A SERPL-MCNC: 66 UG/DL (ref 38–106)
VIT B1 BLD-MCNC: 52 UG/L (ref 38–122)

## 2020-11-20 ENCOUNTER — PATIENT OUTREACH (OUTPATIENT)
Dept: OTHER | Facility: OTHER | Age: 65
End: 2020-11-20

## 2020-11-20 NOTE — PROGRESS NOTES
Digital Medicine: Health  Follow-Up    The history is provided by the patient.             Reason for review: Blood pressure at goal        Topics Covered on Call: physical activity and Diet    Additional Follow-up details: Patient says he is feeling well and happy with his BP readings.  He states he does not have any questions or need anything from me today.            Diet-no change to diet    No change to diet.  Patient reports eating or drinking the following: Low sodium / whole food.      Physical Activity-no change to routine  No change to exercise routine.       He exercises for 60 minutes per day 7 day(s) a week.   Medication Adherence-Medication adherence was assessed.        Substance, Sleep, Stress-No change  stress-  Details:  Intervention(s):    Sleep-  Details:  Intervention(s):    Alcohol -  Details:  Intervention(s):    Tobacco-  Details:  Intervention(s):          Continue current diet/physical activity routine.       Addressed patient questions and patient has my contact information if needed prior to next outreach.   Explained the importance of self-monitoring and medication adherence. Encouraged the patient to communicate with their health  for lifestyle modifications to help improve or maintain a healthy lifestyle.               There are no preventive care reminders to display for this patient.      Last 5 Patient Entered Readings                                      Current 30 Day Average: 130/79     Recent Readings 11/14/2020 11/12/2020 11/1/2020 10/27/2020 10/18/2020    SBP (mmHg) 129 130 134 126 128    DBP (mmHg) 78 81 79 77 73    Pulse 51 57 62 58 53

## 2020-12-17 ENCOUNTER — PATIENT MESSAGE (OUTPATIENT)
Dept: ADMINISTRATIVE | Facility: OTHER | Age: 65
End: 2020-12-17

## 2021-01-20 ENCOUNTER — PATIENT MESSAGE (OUTPATIENT)
Dept: ADMINISTRATIVE | Facility: OTHER | Age: 66
End: 2021-01-20

## 2021-04-29 ENCOUNTER — PATIENT MESSAGE (OUTPATIENT)
Dept: RESEARCH | Facility: HOSPITAL | Age: 66
End: 2021-04-29

## 2021-06-15 ENCOUNTER — OFFICE VISIT (OUTPATIENT)
Dept: DERMATOLOGY | Facility: CLINIC | Age: 66
End: 2021-06-15
Payer: MEDICARE

## 2021-06-15 DIAGNOSIS — D18.01 CHERRY ANGIOMA: ICD-10-CM

## 2021-06-15 DIAGNOSIS — L81.4 SOLAR LENTIGO: ICD-10-CM

## 2021-06-15 DIAGNOSIS — D22.9 MULTIPLE BENIGN NEVI: ICD-10-CM

## 2021-06-15 DIAGNOSIS — L82.0 INFLAMED SEBORRHEIC KERATOSIS: ICD-10-CM

## 2021-06-15 DIAGNOSIS — Z85.820 HISTORY OF MALIGNANT MELANOMA OF SKIN: ICD-10-CM

## 2021-06-15 DIAGNOSIS — L82.1 SEBORRHEIC KERATOSES: ICD-10-CM

## 2021-06-15 DIAGNOSIS — L90.5 SCAR: ICD-10-CM

## 2021-06-15 DIAGNOSIS — L57.0 ACTINIC KERATOSES: Primary | ICD-10-CM

## 2021-06-15 PROCEDURE — 99999 PR PBB SHADOW E&M-EST. PATIENT-LVL III: ICD-10-PCS | Mod: PBBFAC,,, | Performed by: DERMATOLOGY

## 2021-06-15 PROCEDURE — 17003 DESTRUCT PREMALG LES 2-14: CPT | Mod: S$GLB,,, | Performed by: DERMATOLOGY

## 2021-06-15 PROCEDURE — 1157F ADVNC CARE PLAN IN RCRD: CPT | Mod: S$GLB,,, | Performed by: DERMATOLOGY

## 2021-06-15 PROCEDURE — 17000 PR DESTRUCTION(LASER SURGERY,CRYOSURGERY,CHEMOSURGERY),PREMALIGNANT LESIONS,FIRST LESION: ICD-10-PCS | Mod: S$GLB,,, | Performed by: DERMATOLOGY

## 2021-06-15 PROCEDURE — 1157F PR ADVANCE CARE PLAN OR EQUIV PRESENT IN MEDICAL RECORD: ICD-10-PCS | Mod: S$GLB,,, | Performed by: DERMATOLOGY

## 2021-06-15 PROCEDURE — 17110 DESTRUCTION B9 LES UP TO 14: CPT | Mod: 59,S$GLB,, | Performed by: DERMATOLOGY

## 2021-06-15 PROCEDURE — 17003 DESTRUCTION, PREMALIGNANT LESIONS; SECOND THROUGH 14 LESIONS: ICD-10-PCS | Mod: S$GLB,,, | Performed by: DERMATOLOGY

## 2021-06-15 PROCEDURE — 99213 OFFICE O/P EST LOW 20 MIN: CPT | Mod: 25,S$GLB,, | Performed by: DERMATOLOGY

## 2021-06-15 PROCEDURE — 17110 PR DESTRUCTION BENIGN LESIONS UP TO 14: ICD-10-PCS | Mod: 59,S$GLB,, | Performed by: DERMATOLOGY

## 2021-06-15 PROCEDURE — 99999 PR PBB SHADOW E&M-EST. PATIENT-LVL III: CPT | Mod: PBBFAC,,, | Performed by: DERMATOLOGY

## 2021-06-15 PROCEDURE — 99213 PR OFFICE/OUTPT VISIT, EST, LEVL III, 20-29 MIN: ICD-10-PCS | Mod: 25,S$GLB,, | Performed by: DERMATOLOGY

## 2021-06-15 PROCEDURE — 17000 DESTRUCT PREMALG LESION: CPT | Mod: S$GLB,,, | Performed by: DERMATOLOGY

## 2021-07-18 ENCOUNTER — PATIENT MESSAGE (OUTPATIENT)
Dept: ADMINISTRATIVE | Facility: OTHER | Age: 66
End: 2021-07-18

## 2021-12-17 ENCOUNTER — PATIENT MESSAGE (OUTPATIENT)
Dept: ADMINISTRATIVE | Facility: OTHER | Age: 66
End: 2021-12-17
Payer: MEDICARE

## 2022-01-14 ENCOUNTER — PATIENT MESSAGE (OUTPATIENT)
Dept: ADMINISTRATIVE | Facility: OTHER | Age: 67
End: 2022-01-14
Payer: MEDICARE

## 2022-04-29 ENCOUNTER — OFFICE VISIT (OUTPATIENT)
Dept: FAMILY MEDICINE | Facility: CLINIC | Age: 67
End: 2022-04-29
Attending: FAMILY MEDICINE
Payer: MEDICARE

## 2022-04-29 VITALS
BODY MASS INDEX: 29.86 KG/M2 | RESPIRATION RATE: 18 BRPM | TEMPERATURE: 98 F | DIASTOLIC BLOOD PRESSURE: 82 MMHG | HEIGHT: 68 IN | OXYGEN SATURATION: 98 % | WEIGHT: 197.06 LBS | HEART RATE: 56 BPM | SYSTOLIC BLOOD PRESSURE: 146 MMHG

## 2022-04-29 DIAGNOSIS — Z00.00 ENCOUNTER FOR PREVENTIVE HEALTH EXAMINATION: Primary | ICD-10-CM

## 2022-04-29 DIAGNOSIS — F17.201 TOBACCO ABUSE, IN REMISSION: ICD-10-CM

## 2022-04-29 DIAGNOSIS — K76.0 FATTY LIVER: ICD-10-CM

## 2022-04-29 DIAGNOSIS — Z85.820 PERSONAL HISTORY OF MALIGNANT MELANOMA OF SKIN: ICD-10-CM

## 2022-04-29 DIAGNOSIS — Z98.890 HISTORY OF LUMBAR LAMINECTOMY: ICD-10-CM

## 2022-04-29 DIAGNOSIS — I10 ESSENTIAL HYPERTENSION: ICD-10-CM

## 2022-04-29 DIAGNOSIS — Z12.5 PROSTATE CANCER SCREENING: ICD-10-CM

## 2022-04-29 DIAGNOSIS — Z87.891 PERSONAL HISTORY OF NICOTINE DEPENDENCE: ICD-10-CM

## 2022-04-29 DIAGNOSIS — M48.062 SPINAL STENOSIS, LUMBAR REGION, WITH NEUROGENIC CLAUDICATION: ICD-10-CM

## 2022-04-29 DIAGNOSIS — M43.17 SPONDYLOLISTHESIS AT L5-S1 LEVEL: ICD-10-CM

## 2022-04-29 PROCEDURE — 1126F PR PAIN SEVERITY QUANTIFIED, NO PAIN PRESENT: ICD-10-PCS | Mod: CPTII,S$GLB,, | Performed by: FAMILY MEDICINE

## 2022-04-29 PROCEDURE — 3008F PR BODY MASS INDEX (BMI) DOCUMENTED: ICD-10-PCS | Mod: CPTII,S$GLB,, | Performed by: FAMILY MEDICINE

## 2022-04-29 PROCEDURE — 1160F RVW MEDS BY RX/DR IN RCRD: CPT | Mod: CPTII,S$GLB,, | Performed by: FAMILY MEDICINE

## 2022-04-29 PROCEDURE — 4010F ACE/ARB THERAPY RXD/TAKEN: CPT | Mod: CPTII,S$GLB,, | Performed by: FAMILY MEDICINE

## 2022-04-29 PROCEDURE — 1159F PR MEDICATION LIST DOCUMENTED IN MEDICAL RECORD: ICD-10-PCS | Mod: CPTII,S$GLB,, | Performed by: FAMILY MEDICINE

## 2022-04-29 PROCEDURE — 3077F SYST BP >= 140 MM HG: CPT | Mod: CPTII,S$GLB,, | Performed by: FAMILY MEDICINE

## 2022-04-29 PROCEDURE — 1157F ADVNC CARE PLAN IN RCRD: CPT | Mod: CPTII,S$GLB,, | Performed by: FAMILY MEDICINE

## 2022-04-29 PROCEDURE — 99999 PR PBB SHADOW E&M-EST. PATIENT-LVL IV: CPT | Mod: PBBFAC,,, | Performed by: FAMILY MEDICINE

## 2022-04-29 PROCEDURE — 1159F MED LIST DOCD IN RCRD: CPT | Mod: CPTII,S$GLB,, | Performed by: FAMILY MEDICINE

## 2022-04-29 PROCEDURE — 1101F PT FALLS ASSESS-DOCD LE1/YR: CPT | Mod: CPTII,S$GLB,, | Performed by: FAMILY MEDICINE

## 2022-04-29 PROCEDURE — 3008F BODY MASS INDEX DOCD: CPT | Mod: CPTII,S$GLB,, | Performed by: FAMILY MEDICINE

## 2022-04-29 PROCEDURE — 3079F PR MOST RECENT DIASTOLIC BLOOD PRESSURE 80-89 MM HG: ICD-10-PCS | Mod: CPTII,S$GLB,, | Performed by: FAMILY MEDICINE

## 2022-04-29 PROCEDURE — 3077F PR MOST RECENT SYSTOLIC BLOOD PRESSURE >= 140 MM HG: ICD-10-PCS | Mod: CPTII,S$GLB,, | Performed by: FAMILY MEDICINE

## 2022-04-29 PROCEDURE — 99397 PR PREVENTIVE VISIT,EST,65 & OVER: ICD-10-PCS | Mod: S$GLB,,, | Performed by: FAMILY MEDICINE

## 2022-04-29 PROCEDURE — 99999 PR PBB SHADOW E&M-EST. PATIENT-LVL IV: ICD-10-PCS | Mod: PBBFAC,,, | Performed by: FAMILY MEDICINE

## 2022-04-29 PROCEDURE — 1160F PR REVIEW ALL MEDS BY PRESCRIBER/CLIN PHARMACIST DOCUMENTED: ICD-10-PCS | Mod: CPTII,S$GLB,, | Performed by: FAMILY MEDICINE

## 2022-04-29 PROCEDURE — 3079F DIAST BP 80-89 MM HG: CPT | Mod: CPTII,S$GLB,, | Performed by: FAMILY MEDICINE

## 2022-04-29 PROCEDURE — 4010F PR ACE/ARB THEARPY RXD/TAKEN: ICD-10-PCS | Mod: CPTII,S$GLB,, | Performed by: FAMILY MEDICINE

## 2022-04-29 PROCEDURE — 1126F AMNT PAIN NOTED NONE PRSNT: CPT | Mod: CPTII,S$GLB,, | Performed by: FAMILY MEDICINE

## 2022-04-29 PROCEDURE — 3288F PR FALLS RISK ASSESSMENT DOCUMENTED: ICD-10-PCS | Mod: CPTII,S$GLB,, | Performed by: FAMILY MEDICINE

## 2022-04-29 PROCEDURE — 3288F FALL RISK ASSESSMENT DOCD: CPT | Mod: CPTII,S$GLB,, | Performed by: FAMILY MEDICINE

## 2022-04-29 PROCEDURE — 1101F PR PT FALLS ASSESS DOC 0-1 FALLS W/OUT INJ PAST YR: ICD-10-PCS | Mod: CPTII,S$GLB,, | Performed by: FAMILY MEDICINE

## 2022-04-29 PROCEDURE — 99397 PER PM REEVAL EST PAT 65+ YR: CPT | Mod: S$GLB,,, | Performed by: FAMILY MEDICINE

## 2022-04-29 PROCEDURE — 1157F PR ADVANCE CARE PLAN OR EQUIV PRESENT IN MEDICAL RECORD: ICD-10-PCS | Mod: CPTII,S$GLB,, | Performed by: FAMILY MEDICINE

## 2022-04-29 RX ORDER — LISINOPRIL 10 MG/1
10 TABLET ORAL DAILY
Qty: 90 TABLET | Refills: 1 | Status: SHIPPED | OUTPATIENT
Start: 2022-04-29 | End: 2022-06-14

## 2022-04-29 NOTE — PROGRESS NOTES
Subjective:       Patient ID: Mal Larkin is a 66 y.o. male.    Chief Complaint: Annual Exam    66-year-old male comes in for annual exam.  He is not fasting for lab today.  He has a history of greater than 30 pack years of smoking and quit eight years ago.  He is over a year since his last low-dose CT scan and he is willing to do that.  He is due for a PSA, lipid panel, CMP, and CBC.  His next colonoscopy will be due in January of 2023. History includes spinal stenosis with spondylolisthesis in the lumbar area with neurogenic claudication status post lumbar laminectomy in 2011 and a laminectomy fusion in 2018.  He is having no back problems at this time.  He has a history of hypertension previously on lisinopril and amlodipine but he had bariatric surgery with the G sleeve and lost a good bit of weight and has not been on blood pressure medications for some years.  He has had a malignant melanoma removed from the upper back in 2011 and a history of fatty liver disease, Dupuytren's contracture bilaterally with a release on the right side in 2013 and a history of adenomatous colon polyps.    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  He recently is hired a new [employer] firm he hopes to complete the 20% left in the near future.    Past Medical History:  No date: Adenomatous colon polyp      Comment:  + dysplasia  No date: Colon polyp  No date: Fatty liver  2011: Melanoma      Comment:  in situ - upper back    Past Surgical History:  No date: APPENDECTOMY  No date: BACK SURGERY      Comment:  transverse process fracture  11/11/2013: COLONOSCOPY      Comment:  Dr. Hayes, 3 year recheck  12/20/2016: COLONOSCOPY; N/A      Comment:  Procedure: COLONOSCOPY;  Surgeon: Atilio Hayes MD;                Location: [facility];  Service: Endoscopy;  Laterality:                N/A;  1/20/2020: COLONOSCOPY; N/A      Comment:   Procedure: COLONOSCOPY;  Surgeon: Atilio Hayes MD;                Location: [facility] END[facility];  Service: Endoscopy;  Laterality:                N/A;  12/11/2018: FUSION OF LUMBAR SPINE USING POSTERIOR INTERBODY   TECHNIQUE; N/A      Comment:  Procedure: FUSION, SPINE, LUMBAR, PLIF L1-2, L2-3, L3-4,               L4-5, L5-S1;  Surgeon: Vik Cox MD;  Location:                [facility] OR;  Service: Orthopedics;  Laterality: N/A;  No date: GASTRIC SLEEVE  No date: HAND SURGERY      Comment:  right dupuytrens release on 8/5/13  2011: LUMBAR LAMINECTOMY  12/11/2018: LUMBAR LAMINECTOMY; N/A      Comment:  Procedure: LAMINECTOMY, SPINE, LUMBAR L1, L2, L3 with                 L4-L5 Re-exploration;  Surgeon: Vik Cox MD;                 Location: [facility] OR;  Service: Orthopedics;  Laterality:                N/A;  2011: melanoma removal  12/11/2018: SPINE SURGERY      Comment:  Dr Cox L1 - S1. lamanectomy/fusion  No date: WISDOM TOOTH EXTRACTION    Review of patient's family history indicates:  Problem: Skin cancer      Relation: Mother          Age of Onset: (Not Specified)  Problem: Ovarian cancer      Relation: Mother          Age of Onset: (Not Specified)  Problem: Heart disease      Relation: Mother          Age of Onset: (Not Specified)  Problem: Prostate cancer      Relation: Father          Age of Onset: (Not Specified)  Problem: Colon cancer      Relation: Father          Age of Onset: (Not Specified)  Problem: Cancer      Relation: Father          Age of Onset: (Not Specified)          Comment: glands  Problem: Melanoma      Relation: Father          Age of Onset: (Not Specified)  Problem: Melanoma      Relation: Sister          Age of Onset: (Not Specified)  Problem: Asthma      Relation: Daughter          Age of Onset: (Not Specified)  Problem: Fibromyalgia      Relation: Daughter          Age of Onset: (Not Specified)  Problem: Heart disease      Relation: Maternal Uncle          Age of Onset: (Not  Specified)  Problem: Alcohol abuse      Relation: Maternal Uncle          Age of Onset: (Not Specified)  Problem: Cancer      Relation: Paternal Aunt          Age of Onset: (Not Specified)  Problem: Cerebral aneurysm      Relation: Maternal Grandmother          Age of Onset: (Not Specified)  Problem: Early death      Relation: Maternal Grandmother          Age of Onset: (Not Specified)          Comment: fall hit head  Problem: Heart disease      Relation: Maternal Grandfather          Age of Onset: (Not Specified)  Problem: Cancer      Relation: Paternal Grandmother          Age of Onset: (Not Specified)  Problem: Pancreatic cancer      Relation: Paternal Grandmother          Age of Onset: (Not Specified)  Problem: Cancer      Relation: Sister          Age of Onset: (Not Specified)          Comment: cervix, abd   Problem: Melanoma      Relation: Sister          Age of Onset: (Not Specified)  Problem: Skin cancer      Relation: Sister          Age of Onset: (Not Specified)  Problem: Vaginal cancer      Relation: Sister          Age of Onset: (Not Specified)  Problem: Macular degeneration      Relation: Neg Hx          Age of Onset: (Not Specified)  Problem: Retinal detachment      Relation: Neg Hx          Age of Onset: (Not Specified)  Problem: Glaucoma      Relation: Neg Hx          Age of Onset: (Not Specified)  Problem: Psoriasis      Relation: Neg Hx          Age of Onset: (Not Specified)  Problem: Lupus      Relation: Neg Hx          Age of Onset: (Not Specified)  Problem: Eczema      Relation: Neg Hx          Age of Onset: (Not Specified)    Social History    Tobacco Use      Smoking status: Former Smoker        Packs/day: 1.00        Years: 30.00        Pack years: 30        Types: Cigarettes        Quit date: 2014        Years since quittin.9      Smokeless tobacco: Former User        Types: Chew        Quit date: 1979    Alcohol use: Yes      Alcohol/week: 10.0 standard drinks      Types: 12  Standard drinks or equivalent per week      Comment: Drinks beer only and mostly during football season    Drug use: No    Current Outpatient Medications on File Prior to Visit:  ascorbic acid, vitamin C, (VITAMIN C) 500 MG tablet, Take 500 mg by mouth once daily., Disp: , Rfl:   calcium-vitamin D3 (OS-MAGGIE 500 + D3) 500 mg-5 mcg (200 unit) per tablet, Take 1 tablet by mouth Daily., Disp: , Rfl:   COQ10, LIPOSOMAL UBIQUINOL, ORAL, Take 1 capsule by mouth once daily., Disp: , Rfl:   cyanocobalamin (VITAMIN B-12) 1000 MCG tablet, Take 100 mcg by mouth once daily., Disp: , Rfl:   glucosamine-chondroitin 500-400 mg tablet, Take 1 tablet by mouth 3 (three) times daily., Disp: , Rfl:   KRILL OIL ORAL, Take 1 capsule by mouth once daily., Disp: , Rfl:   multivitamin (THERAGRAN) per tablet, Take 1 tablet by mouth once daily., Disp: , Rfl:   vitamin E 1000 UNIT capsule, Take 1,000 Units by mouth once daily., Disp: , Rfl:   VITAMIN K2 ORAL, Take by mouth., Disp: , Rfl:     No current facility-administered medications on file prior to visit.        Review of Systems   Constitutional: Negative for activity change and unexpected weight change.   HENT: Negative for hearing loss, rhinorrhea and trouble swallowing.    Eyes: Negative for discharge and visual disturbance.   Respiratory: Negative for chest tightness and wheezing.    Cardiovascular: Negative for chest pain and palpitations.   Gastrointestinal: Negative for blood in stool, constipation, diarrhea and vomiting.   Endocrine: Negative for polydipsia and polyuria.   Genitourinary: Negative for difficulty urinating, hematuria and urgency.   Musculoskeletal: Negative for arthralgias, joint swelling and neck pain.   Skin: Negative for color change and rash.   Neurological: Negative for weakness and headaches.   Psychiatric/Behavioral: Negative for confusion and dysphoric mood.       Objective:      Physical Exam  Vitals and nursing note reviewed.   Constitutional:       General:  He is not in acute distress.     Appearance: Normal appearance. He is well-developed. He is not ill-appearing, toxic-appearing or diaphoretic.      Comments: Mildly elevated blood pressure  Overweight with a BMI of 29.9 he is up 13 lb since his last physical October 28, 2020 and at that time was up 10-1/2 lb from the physical the your before.   HENT:      Head: Normocephalic and atraumatic.      Right Ear: Tympanic membrane, ear canal and external ear normal. There is no impacted cerumen.      Left Ear: Tympanic membrane, ear canal and external ear normal. There is no impacted cerumen.      Nose: Nose normal. No congestion or rhinorrhea.      Mouth/Throat:      Mouth: Mucous membranes are moist.      Pharynx: Oropharynx is clear. No oropharyngeal exudate or posterior oropharyngeal erythema.   Eyes:      General: No scleral icterus.     Extraocular Movements: Extraocular movements intact.      Conjunctiva/sclera: Conjunctivae normal.      Pupils: Pupils are equal, round, and reactive to light.   Neck:      Thyroid: No thyromegaly.      Vascular: No carotid bruit or JVD.      Trachea: No tracheal deviation.   Cardiovascular:      Rate and Rhythm: Normal rate and regular rhythm.      Heart sounds: Normal heart sounds. No murmur heard.    No friction rub. No gallop.   Pulmonary:      Effort: Pulmonary effort is normal. No respiratory distress.      Breath sounds: Normal breath sounds. No stridor. No wheezing, rhonchi or rales.   Chest:      Chest wall: No tenderness.   Abdominal:      General: Abdomen is flat. Bowel sounds are normal. There is no distension.      Palpations: Abdomen is soft. There is no mass.      Tenderness: There is no abdominal tenderness. There is no guarding or rebound.      Hernia: No hernia is present.   Musculoskeletal:         General: No swelling, tenderness, deformity or signs of injury. Normal range of motion.      Cervical back: Normal range of motion and neck supple. No rigidity or  tenderness.      Right lower leg: No edema.      Left lower leg: No edema.   Lymphadenopathy:      Cervical: No cervical adenopathy.   Skin:     General: Skin is warm and dry.      Coloration: Skin is not jaundiced or pale.      Findings: No bruising, erythema, lesion or rash.   Neurological:      General: No focal deficit present.      Mental Status: He is alert and oriented to person, place, and time. Mental status is at baseline.      Cranial Nerves: No cranial nerve deficit.      Sensory: No sensory deficit.      Motor: No weakness.      Coordination: Coordination normal.      Gait: Gait normal.      Deep Tendon Reflexes: Reflexes are normal and symmetric. Reflexes normal.   Psychiatric:         Mood and Affect: Mood normal.         Behavior: Behavior normal.         Thought Content: Thought content normal.         Judgment: Judgment normal.         Assessment:       1. Encounter for preventive health examination    2. Essential hypertension    3. Fatty liver    4. Spondylolisthesis at L5-S1 level    5. Spinal stenosis, lumbar region, with neurogenic claudication    6. History of lumbar laminectomy    7. Personal history of malignant melanoma of skin    8. Tobacco abuse, in remission    9. Prostate cancer screening    10. Personal history of nicotine dependence     11. BMI 29.0-29.9,adult        Plan:       1. Encounter for preventive health examination  - PSA, Screening; Future  - Lipid Panel; Future  - Comprehensive Metabolic Panel; Future  - CBC Auto Differential; Future    2. Essential hypertension  Had low-dose lisinopril 10 mg daily, get a nurse blood pressure check in four weeks and adjust accordingly.  Previously he was on lisinopril and tolerated it without cough or other problem  - Lipid Panel; Future  - Comprehensive Metabolic Panel; Future  - CBC Auto Differential; Future  - lisinopriL 10 MG tablet; Take 1 tablet (10 mg total) by mouth once daily.  Dispense: 90 tablet; Refill: 1    3. Fatty  liver  Await chemistry panel results previously resolved with weight loss following G sleeve  - Comprehensive Metabolic Panel; Future    4. Spondylolisthesis at L5-S1 level  Asymptomatic currently    5. Spinal stenosis, lumbar region, with neurogenic claudication  Asymptomatic post two laminectomies and fusion last surgery in December 2018    6. History of lumbar laminectomy  See above    7. Personal history of malignant melanoma of skin  Excision from upper back in 2011 no sign of recurrence, followed by Dermatology still    8. Tobacco abuse, in remission  Due for low-dose CT scan, to be scheduled  - CT Chest Lung Screening Low Dose; Future    9. Prostate cancer screening  PSA pending  - PSA, Screening; Future    10. Personal history of nicotine dependence   - CT Chest Lung Screening Low Dose; Future    11. BMI 29.0-29.9,adult

## 2022-05-03 ENCOUNTER — LAB VISIT (OUTPATIENT)
Dept: LAB | Facility: HOSPITAL | Age: 67
End: 2022-05-03
Attending: FAMILY MEDICINE
Payer: MEDICARE

## 2022-05-03 DIAGNOSIS — I10 ESSENTIAL HYPERTENSION: ICD-10-CM

## 2022-05-03 DIAGNOSIS — K76.0 FATTY LIVER: ICD-10-CM

## 2022-05-03 DIAGNOSIS — Z12.5 PROSTATE CANCER SCREENING: ICD-10-CM

## 2022-05-03 DIAGNOSIS — Z00.00 ENCOUNTER FOR PREVENTIVE HEALTH EXAMINATION: ICD-10-CM

## 2022-05-03 LAB
ALBUMIN SERPL BCP-MCNC: 4.1 G/DL (ref 3.5–5.2)
ALP SERPL-CCNC: 56 U/L (ref 55–135)
ALT SERPL W/O P-5'-P-CCNC: 13 U/L (ref 10–44)
ANION GAP SERPL CALC-SCNC: 11 MMOL/L (ref 8–16)
AST SERPL-CCNC: 19 U/L (ref 10–40)
BASOPHILS # BLD AUTO: 0.03 K/UL (ref 0–0.2)
BASOPHILS NFR BLD: 0.7 % (ref 0–1.9)
BILIRUB SERPL-MCNC: 0.8 MG/DL (ref 0.1–1)
BUN SERPL-MCNC: 10 MG/DL (ref 8–23)
CALCIUM SERPL-MCNC: 9.7 MG/DL (ref 8.7–10.5)
CHLORIDE SERPL-SCNC: 99 MMOL/L (ref 95–110)
CHOLEST SERPL-MCNC: 195 MG/DL (ref 120–199)
CHOLEST/HDLC SERPL: 1.7 {RATIO} (ref 2–5)
CO2 SERPL-SCNC: 26 MMOL/L (ref 23–29)
COMPLEXED PSA SERPL-MCNC: 0.59 NG/ML (ref 0–4)
CREAT SERPL-MCNC: 0.7 MG/DL (ref 0.5–1.4)
DIFFERENTIAL METHOD: ABNORMAL
EOSINOPHIL # BLD AUTO: 0.1 K/UL (ref 0–0.5)
EOSINOPHIL NFR BLD: 1.2 % (ref 0–8)
ERYTHROCYTE [DISTWIDTH] IN BLOOD BY AUTOMATED COUNT: 12.1 % (ref 11.5–14.5)
EST. GFR  (AFRICAN AMERICAN): >60 ML/MIN/1.73 M^2
EST. GFR  (NON AFRICAN AMERICAN): >60 ML/MIN/1.73 M^2
GLUCOSE SERPL-MCNC: 91 MG/DL (ref 70–110)
HCT VFR BLD AUTO: 43.6 % (ref 40–54)
HDLC SERPL-MCNC: 113 MG/DL (ref 40–75)
HDLC SERPL: 57.9 % (ref 20–50)
HGB BLD-MCNC: 14.8 G/DL (ref 14–18)
IMM GRANULOCYTES # BLD AUTO: 0.01 K/UL (ref 0–0.04)
IMM GRANULOCYTES NFR BLD AUTO: 0.2 % (ref 0–0.5)
LDLC SERPL CALC-MCNC: 77 MG/DL (ref 63–159)
LYMPHOCYTES # BLD AUTO: 1.3 K/UL (ref 1–4.8)
LYMPHOCYTES NFR BLD: 32.8 % (ref 18–48)
MCH RBC QN AUTO: 33 PG (ref 27–31)
MCHC RBC AUTO-ENTMCNC: 33.9 G/DL (ref 32–36)
MCV RBC AUTO: 97 FL (ref 82–98)
MONOCYTES # BLD AUTO: 0.5 K/UL (ref 0.3–1)
MONOCYTES NFR BLD: 12.4 % (ref 4–15)
NEUTROPHILS # BLD AUTO: 2.1 K/UL (ref 1.8–7.7)
NEUTROPHILS NFR BLD: 52.7 % (ref 38–73)
NONHDLC SERPL-MCNC: 82 MG/DL
NRBC BLD-RTO: 0 /100 WBC
PLATELET # BLD AUTO: 217 K/UL (ref 150–450)
PMV BLD AUTO: 10.5 FL (ref 9.2–12.9)
POTASSIUM SERPL-SCNC: 4.6 MMOL/L (ref 3.5–5.1)
PROT SERPL-MCNC: 6.7 G/DL (ref 6–8.4)
RBC # BLD AUTO: 4.48 M/UL (ref 4.6–6.2)
SODIUM SERPL-SCNC: 136 MMOL/L (ref 136–145)
TRIGL SERPL-MCNC: 25 MG/DL (ref 30–150)
WBC # BLD AUTO: 4.03 K/UL (ref 3.9–12.7)

## 2022-05-03 PROCEDURE — 80053 COMPREHEN METABOLIC PANEL: CPT | Performed by: FAMILY MEDICINE

## 2022-05-03 PROCEDURE — 84153 ASSAY OF PSA TOTAL: CPT | Performed by: FAMILY MEDICINE

## 2022-05-03 PROCEDURE — 80061 LIPID PANEL: CPT | Performed by: FAMILY MEDICINE

## 2022-05-03 PROCEDURE — 36415 COLL VENOUS BLD VENIPUNCTURE: CPT | Mod: PO | Performed by: FAMILY MEDICINE

## 2022-05-03 PROCEDURE — 85025 COMPLETE CBC W/AUTO DIFF WBC: CPT | Performed by: FAMILY MEDICINE

## 2022-05-05 ENCOUNTER — HOSPITAL ENCOUNTER (OUTPATIENT)
Dept: RADIOLOGY | Facility: HOSPITAL | Age: 67
Discharge: HOME | End: 2022-05-05
Attending: FAMILY MEDICINE
Payer: MEDICARE

## 2022-05-05 DIAGNOSIS — Z87.891 PERSONAL HISTORY OF NICOTINE DEPENDENCE: ICD-10-CM

## 2022-05-05 DIAGNOSIS — F17.201 TOBACCO ABUSE, IN REMISSION: ICD-10-CM

## 2022-05-05 PROCEDURE — 71271 CT THORAX LUNG CANCER SCR C-: CPT | Mod: TC,PO

## 2022-05-30 ENCOUNTER — CLINICAL SUPPORT (OUTPATIENT)
Dept: FAMILY MEDICINE | Facility: CLINIC | Age: 67
End: 2022-05-30
Payer: MEDICARE

## 2022-05-30 ENCOUNTER — TELEPHONE (OUTPATIENT)
Dept: FAMILY MEDICINE | Facility: CLINIC | Age: 67
End: 2022-05-30

## 2022-05-30 ENCOUNTER — PATIENT MESSAGE (OUTPATIENT)
Dept: FAMILY MEDICINE | Facility: CLINIC | Age: 67
End: 2022-05-30

## 2022-05-30 VITALS — DIASTOLIC BLOOD PRESSURE: 74 MMHG | SYSTOLIC BLOOD PRESSURE: 132 MMHG | HEART RATE: 66 BPM

## 2022-05-30 DIAGNOSIS — I10 HYPERTENSION, UNSPECIFIED TYPE: Primary | ICD-10-CM

## 2022-05-30 NOTE — TELEPHONE ENCOUNTER
Patient came in today for blood pressure check. Patient recently had a bp with systolic of 199 so he increased his Lisinopril 10 to 2 tablets daily.  He had eaten breakfast of eggs, sausage and farley while eating in restaurant. He says he eats that often. No other readings available.    132/74 66

## 2022-05-30 NOTE — TELEPHONE ENCOUNTER
132/74 is fairly good, I would like the 132 to be under 130 if possible.  Sounds like he is having a really high salt diet.  If he could cut back the salt it would help and we would not have to go up to the 40 mg lisinopril, at least not yet.  His last lisinopril refill was about a month ago and he is going to be using them up twice as fast, does he want me to send in the 20 mg now or does he want to use the 10 mg up 1st?

## 2022-06-14 ENCOUNTER — PATIENT MESSAGE (OUTPATIENT)
Dept: FAMILY MEDICINE | Facility: CLINIC | Age: 67
End: 2022-06-14
Payer: MEDICARE

## 2022-06-14 DIAGNOSIS — I10 ESSENTIAL HYPERTENSION: ICD-10-CM

## 2022-06-14 RX ORDER — LISINOPRIL 30 MG/1
30 TABLET ORAL DAILY
Qty: 90 TABLET | Refills: 1 | Status: SHIPPED | OUTPATIENT
Start: 2022-06-14 | End: 2022-07-15 | Stop reason: DRUGHIGH

## 2022-06-14 NOTE — TELEPHONE ENCOUNTER
Spoke with patient he last BP before he ran out of medication was 131/69 P 51 on 6/14/22.  I did find a note from digital hypertension where he told them he was taking 30 mg of Lisnopril

## 2022-06-14 NOTE — TELEPHONE ENCOUNTER
I need to know what his blood pressure was before he ran out of the lisinopril.  If he was not controlled we were going to go to 40 mg.  Patient was never instructed to take 30 mg, he did that on his own.

## 2022-06-14 NOTE — TELEPHONE ENCOUNTER
Lisinopril 30 mg sent to [facility].  I would like to have a nurse blood pressure check in 3 to 4 weeks.

## 2022-06-14 NOTE — TELEPHONE ENCOUNTER
Spoke with patient and advised that the reason the pharmacy would not refill the medication was due to we needed to send in a new scripts since his dosage was changed.  The messaged stated for him to take Lisinopril 10 mg two tablets daily, but he has been taking 30 mg daily.  He wants new script be sent to [facility].

## 2022-06-16 ENCOUNTER — OFFICE VISIT (OUTPATIENT)
Dept: FAMILY MEDICINE | Facility: CLINIC | Age: 67
End: 2022-06-16
Payer: MEDICARE

## 2022-06-16 VITALS
DIASTOLIC BLOOD PRESSURE: 80 MMHG | WEIGHT: 196.19 LBS | TEMPERATURE: 98 F | HEART RATE: 58 BPM | OXYGEN SATURATION: 96 % | BODY MASS INDEX: 29.73 KG/M2 | SYSTOLIC BLOOD PRESSURE: 150 MMHG | HEIGHT: 68 IN

## 2022-06-16 DIAGNOSIS — J84.10 CALCIFIED GRANULOMA OF LUNG: ICD-10-CM

## 2022-06-16 DIAGNOSIS — Z00.00 ENCOUNTER FOR PREVENTIVE HEALTH EXAMINATION: Primary | ICD-10-CM

## 2022-06-16 DIAGNOSIS — I10 ESSENTIAL HYPERTENSION: ICD-10-CM

## 2022-06-16 PROCEDURE — 3079F DIAST BP 80-89 MM HG: CPT | Mod: CPTII,S$GLB,, | Performed by: NURSE PRACTITIONER

## 2022-06-16 PROCEDURE — 3288F PR FALLS RISK ASSESSMENT DOCUMENTED: ICD-10-PCS | Mod: CPTII,S$GLB,, | Performed by: NURSE PRACTITIONER

## 2022-06-16 PROCEDURE — 99999 PR PBB SHADOW E&M-EST. PATIENT-LVL V: ICD-10-PCS | Mod: PBBFAC,,, | Performed by: NURSE PRACTITIONER

## 2022-06-16 PROCEDURE — G0439 PR MEDICARE ANNUAL WELLNESS SUBSEQUENT VISIT: ICD-10-PCS | Mod: S$GLB,,, | Performed by: NURSE PRACTITIONER

## 2022-06-16 PROCEDURE — 1126F AMNT PAIN NOTED NONE PRSNT: CPT | Mod: CPTII,S$GLB,, | Performed by: NURSE PRACTITIONER

## 2022-06-16 PROCEDURE — 4010F ACE/ARB THERAPY RXD/TAKEN: CPT | Mod: CPTII,S$GLB,, | Performed by: NURSE PRACTITIONER

## 2022-06-16 PROCEDURE — 1101F PR PT FALLS ASSESS DOC 0-1 FALLS W/OUT INJ PAST YR: ICD-10-PCS | Mod: CPTII,S$GLB,, | Performed by: NURSE PRACTITIONER

## 2022-06-16 PROCEDURE — 1126F PR PAIN SEVERITY QUANTIFIED, NO PAIN PRESENT: ICD-10-PCS | Mod: CPTII,S$GLB,, | Performed by: NURSE PRACTITIONER

## 2022-06-16 PROCEDURE — 1157F PR ADVANCE CARE PLAN OR EQUIV PRESENT IN MEDICAL RECORD: ICD-10-PCS | Mod: CPTII,S$GLB,, | Performed by: NURSE PRACTITIONER

## 2022-06-16 PROCEDURE — 1159F PR MEDICATION LIST DOCUMENTED IN MEDICAL RECORD: ICD-10-PCS | Mod: CPTII,S$GLB,, | Performed by: NURSE PRACTITIONER

## 2022-06-16 PROCEDURE — 3008F BODY MASS INDEX DOCD: CPT | Mod: CPTII,S$GLB,, | Performed by: NURSE PRACTITIONER

## 2022-06-16 PROCEDURE — G0439 PPPS, SUBSEQ VISIT: HCPCS | Mod: S$GLB,,, | Performed by: NURSE PRACTITIONER

## 2022-06-16 PROCEDURE — 1157F ADVNC CARE PLAN IN RCRD: CPT | Mod: CPTII,S$GLB,, | Performed by: NURSE PRACTITIONER

## 2022-06-16 PROCEDURE — 3077F PR MOST RECENT SYSTOLIC BLOOD PRESSURE >= 140 MM HG: ICD-10-PCS | Mod: CPTII,S$GLB,, | Performed by: NURSE PRACTITIONER

## 2022-06-16 PROCEDURE — 4010F PR ACE/ARB THEARPY RXD/TAKEN: ICD-10-PCS | Mod: CPTII,S$GLB,, | Performed by: NURSE PRACTITIONER

## 2022-06-16 PROCEDURE — 99499 RISK ADDL DX/OHS AUDIT: ICD-10-PCS | Mod: S$GLB,,, | Performed by: NURSE PRACTITIONER

## 2022-06-16 PROCEDURE — 99999 PR PBB SHADOW E&M-EST. PATIENT-LVL V: CPT | Mod: PBBFAC,,, | Performed by: NURSE PRACTITIONER

## 2022-06-16 PROCEDURE — 3288F FALL RISK ASSESSMENT DOCD: CPT | Mod: CPTII,S$GLB,, | Performed by: NURSE PRACTITIONER

## 2022-06-16 PROCEDURE — 3008F PR BODY MASS INDEX (BMI) DOCUMENTED: ICD-10-PCS | Mod: CPTII,S$GLB,, | Performed by: NURSE PRACTITIONER

## 2022-06-16 PROCEDURE — 1160F RVW MEDS BY RX/DR IN RCRD: CPT | Mod: CPTII,S$GLB,, | Performed by: NURSE PRACTITIONER

## 2022-06-16 PROCEDURE — 1170F FXNL STATUS ASSESSED: CPT | Mod: CPTII,S$GLB,, | Performed by: NURSE PRACTITIONER

## 2022-06-16 PROCEDURE — 1160F PR REVIEW ALL MEDS BY PRESCRIBER/CLIN PHARMACIST DOCUMENTED: ICD-10-PCS | Mod: CPTII,S$GLB,, | Performed by: NURSE PRACTITIONER

## 2022-06-16 PROCEDURE — 99499 UNLISTED E&M SERVICE: CPT | Mod: S$GLB,,, | Performed by: NURSE PRACTITIONER

## 2022-06-16 PROCEDURE — 3079F PR MOST RECENT DIASTOLIC BLOOD PRESSURE 80-89 MM HG: ICD-10-PCS | Mod: CPTII,S$GLB,, | Performed by: NURSE PRACTITIONER

## 2022-06-16 PROCEDURE — 1101F PT FALLS ASSESS-DOCD LE1/YR: CPT | Mod: CPTII,S$GLB,, | Performed by: NURSE PRACTITIONER

## 2022-06-16 PROCEDURE — 1170F PR FUNCTIONAL STATUS ASSESSED: ICD-10-PCS | Mod: CPTII,S$GLB,, | Performed by: NURSE PRACTITIONER

## 2022-06-16 PROCEDURE — 1159F MED LIST DOCD IN RCRD: CPT | Mod: CPTII,S$GLB,, | Performed by: NURSE PRACTITIONER

## 2022-06-16 PROCEDURE — 3077F SYST BP >= 140 MM HG: CPT | Mod: CPTII,S$GLB,, | Performed by: NURSE PRACTITIONER

## 2022-06-16 NOTE — PROGRESS NOTES
"  Mal Larkin presented for a  Medicare AWV and comprehensive Health Risk Assessment today. The following components were reviewed and updated:    · Medical history  · Family History  · Social history  · Allergies and Current Medications  · Health Risk Assessment  · Health Maintenance  · Care Team         ** See Completed Assessments for Annual Wellness Visit within the encounter summary.**         The following assessments were completed:  · Living Situation  · CAGE  · Depression Screening  · Timed Get Up and Go  · Whisper Test  · Cognitive Function Screening  · Nutrition Screening  · ADL Screening  · PAQ Screening            Vitals:    06/16/22 0716 06/16/22 0755   BP: (!) 162/80 (!) 150/80   Pulse: (!) 58    Temp: 97.7 °F (36.5 °C)    SpO2: 96%    Weight: 89 kg (196 lb 3.4 oz)    Height: 5' 8" (1.727 m)      Body mass index is 29.83 kg/m².  Physical Exam  Constitutional:       Appearance: He is well-developed.   HENT:      Head: Normocephalic and atraumatic.      Right Ear: Hearing normal.      Left Ear: Hearing normal.      Nose: Nose normal.   Eyes:      General: Lids are normal.      Conjunctiva/sclera: Conjunctivae normal.      Pupils: Pupils are equal, round, and reactive to light.   Cardiovascular:      Rate and Rhythm: Normal rate.   Pulmonary:      Effort: Pulmonary effort is normal.   Abdominal:      Palpations: Abdomen is soft.   Musculoskeletal:         General: Normal range of motion.      Cervical back: Normal range of motion and neck supple.   Skin:     General: Skin is warm and dry.   Neurological:      Mental Status: He is alert and oriented to person, place, and time.               Diagnoses and health risks identified today and associated recommendations/orders:    1. Encounter for preventive health examination  Discussed health maintenance guidelines appropriate for age.        2. Essential hypertension  Uncontrolled, continue current medication regimen  There was a recent lapse in his " medication taking due to pharmacy issue  He has  bp nurse visit in one month  He is enrolled in digital medicine  Followed by pcp    3. Calcified granuloma of lung  Stable, continue to monitor  No acute symptoms  Followed by pcp      Provided Mal with a 5-10 year written screening schedule and personal prevention plan. Recommendations were developed using the USPSTF age appropriate recommendations. Education, counseling, and referrals were provided as needed. After Visit Summary printed and given to patient which includes a list of additional screenings\tests needed.    Follow up for One year for Annual Wellness Visit.    Sophia Patel NP    I offered to discuss advanced care planning, including how to pick a person who would make decisions for you if you were unable to make them for yourself, called a health care power of , and what kind of decisions you might make such as use of life sustaining treatments such as ventilators and tube feeding when faced with a life limiting illness recorded on a living will that they will need to know. (How you want to be cared for as you near the end of your natural life)     X  Patient has advanced directives on file, which we reviewed, and they do not wish to make changes.

## 2022-06-16 NOTE — PATIENT INSTRUCTIONS
Counseling and Referral of Other Preventative  (Italic type indicates deductible and co-insurance are waived)    Patient Name: Mal Larkin  Today's Date: 6/16/2022    Health Maintenance       Date Due Completion Date    COVID-19 Vaccine (1) Never done ---    Colorectal Cancer Screening 01/20/2023 1/20/2020    PROSTATE-SPECIFIC ANTIGEN 05/03/2023 5/3/2022    Lipid Panel 05/03/2023 5/3/2022    LDCT Lung Screen 05/05/2023 5/5/2022    Pneumococcal Vaccines (Age 65+) (2 - PPSV23 or PCV20) 01/08/2024 11/16/2020    TETANUS VACCINE 07/29/2029 7/29/2019        No orders of the defined types were placed in this encounter.    The following information is provided to all patients.  This information is to help you find resources for any of the problems found today that may be affecting your health:                Living healthy guide: www.[facility].[place].[facility]      Understanding Diabetes: www.diabetes.org      Eating healthy: www.cdc.gov/healthyweight      CDC home safety checklist: www.cdc.gov/steadi/patient.html      Agency on Aging: www.goea.[place].[facility]      Alcoholics anonymous (AA): www.aa.org      Physical Activity: www.[place].nih.gov/ok2srwa      Tobacco use: www.quitwithusla.org

## 2022-06-27 NOTE — H&P
PCP: Tito Marino MD    History & Physical    Chief Complaint: s/p Re-exploration of L1, L2, L3 with L4-5 lumbar laminectomy    History of Present Illness:  Patient is a 63 y.o. male admitted to Hospitalist Service from Operation Room s/p Re-exploration of L1, L2, L3 with L4-5 lumbar laminectomy performed by Dr. Cox. Patient reportedly has past medical history significant for hypertension, history of melanoma and colon polyps. Post-operatively, patient denied chest pain, shortness of breath, abdominal pain, nausea, vomiting, headache, vision changes, focal neuro-deficits, cough or fever.    Past Medical History:   Diagnosis Date    Adenomatous colon polyp     + dysplasia    Colon polyp     Fatty liver     Hypertension     Melanoma 2011    in situ - upper back     Past Surgical History:   Procedure Laterality Date    APPENDECTOMY      BACK SURGERY      transverse process fracture    BLOCK-NERVE-MEDIAL BRANCH-LUMBAR Bilateral 8/3/2017    [facility]    COLONOSCOPY  11/11/2013    Dr. Hayes, 3 year recheck    COLONOSCOPY N/A 12/20/2016    Procedure: COLONOSCOPY;  Surgeon: Atilio Hayes MD;  Location: [facility];  Service: Endoscopy;  Laterality: N/A;    COLONOSCOPY N/A 12/20/2016    [facility] at [facility] ENDO    COLONOSCOPY N/A 11/11/2013    [facility]    COLONOSCOPY N/A 8/1/2013    [facility]    GASTRIC SLEEVE      HAND SURGERY      right dupuytrens release on 8/5/13    LUMBAR LAMINECTOMY  2011    melanoma removal  2011    RADIOFREQUENCY THERMOCOAGULATION ([facility])-NERVE-MEDIAN BRANCH-LUMBAR Bilateral 8/22/2017    [facility]    RELEASE DUPUYTREN'S CONTRACTURE Right 8/5/2013    [facility] OR    WISDOM TOOTH EXTRACTION       Family History   Problem Relation Age of Onset    Skin cancer Mother     Ovarian cancer  Mother     Heart disease Mother     Prostate cancer Father     Colon cancer Father     Cancer Father         glands    Melanoma Father     Melanoma Sister     Asthma Daughter     Fibromyalgia Daughter     Heart disease Maternal Uncle     Alcohol abuse Maternal Uncle     Cancer Paternal Aunt     Cerebral aneurysm Maternal Grandmother     Early death Maternal Grandmother         fall hit head    Heart disease Maternal Grandfather     Cancer Paternal Grandmother     Pancreatic cancer Paternal Grandmother     Cancer Sister         cervix, abd     Melanoma Sister     Skin cancer Sister     Vaginal cancer Sister     Macular degeneration Neg Hx     Retinal detachment Neg Hx     Glaucoma Neg Hx     Psoriasis Neg Hx     Lupus Neg Hx     Eczema Neg Hx      Social History     Tobacco Use    Smoking status: Former Smoker     Packs/day: 1.00     Years: 30.00     Pack years: 30.00     Types: Cigarettes     Last attempt to quit: 2014     Years since quittin.5    Smokeless tobacco: Former User     Types: Chew     Quit date: 1979   Substance Use Topics    Alcohol use: Yes     Alcohol/week: 6.0 oz     Types: 12 Standard drinks or equivalent per week     Comment: Drinks beer only and mostly during football season    Drug use: No      Review of patient's allergies indicates:  No Known Allergies  PTA Medications   Medication Sig    calcium-vitamin D3 (CALCIUM 500 + D) 500 mg(1,250mg) -200 unit per tablet Take 1 tablet by mouth Daily.    multivitamin (ONE DAILY MULTIVITAMIN) per tablet Take 1 tablet by mouth once daily.    naproxen sodium (ANAPROX) 220 MG tablet Take 220 mg by mouth every 12 (twelve) hours.     Review of Systems: Patient seen in recovery room where is significantly sedated at this time, not able to provide ROS.     OBJECTIVE:     Vital Signs (Most Recent)  Temp: 98.4 °F (36.9 °C) (18 0545)  Pulse: (!) 58 (18 0545)  Resp: 16 (18 0545)  BP: 138/75 (18  0545)  SpO2: 100 % (12/11/18 0545)    Physical Exam:  General appearance: well developed, appears stated age  Head: normocephalic, atraumatic  Eyes:  conjunctivae/corneas clear. PERRL.  Nose: Nares normal. Septum midline.  Throat: lips, mucosa, and tongue normal; teeth and gums normal, no throat erythema.  Neck: supple, symmetrical, trachea midline, no JVD and thyroid not enlarged, symmetric, no tenderness/mass/nodules  Lungs:  clear to auscultation bilaterally and normal respiratory effort  Chest wall: no tenderness  Heart: regular rate and rhythm, S1, S2 normal, no murmur, click, rub or gallop  Abdomen: soft, non-tender non-distented; bowel sounds normal; no masses,  no organomegaly  Extremities: no cyanosis, clubbing or edema.   Pulses: 2+ and symmetric  Skin: Skin color, texture, turgor normal. No rashes or lesions.  Lymph nodes: Cervical, supraclavicular, and axillary nodes normal.  Neurologic: Sedated but grossly non-focal, moving extremities.     Laboratory:   CBC: No results for input(s): WBC, RBC, HGB, HCT, PLT, MCV, MCH, MCHC in the last 168 hours.  CMP: No results for input(s): GLU, CALCIUM, ALBUMIN, PROT, NA, K, CO2, CL, BUN, CREATININE, ALKPHOS, ALT, AST, BILITOT in the last 168 hours.    Hemoglobin A1C   Date Value Ref Range Status   07/06/2018 4.9 4.0 - 5.6 % Final     Comment:     ADA Screening Guidelines:  5.7-6.4%  Consistent with prediabetes  >or=6.5%  Consistent with diabetes  High levels of fetal hemoglobin interfere with the HbA1C  assay. Heterozygous hemoglobin variants (HbS, HgC, etc)do  not significantly interfere with this assay.   However, presence of multiple variants may affect accuracy.         Diagnostic Results: None    Assessment/Plan:     Active Hospital Problems    Diagnosis  POA    Spinal stenosis, lumbar region, with neurogenic claudication [M48.062] s/p Re-exploration of L1, L2, L3 with L4-5 lumbar laminectomy  Continue to follow Orthopedic recommendations.  Needs aggressive  incentive spirometry.  Follow hemoglobin and hematocrit closely.  Pain control with IV narcotics and antiemetics as needed.  Physical therapy as per Orthopedics protocol with fall precautions.  Yes          Hypertension [I10]  Chronic problem. Will continue chronic medications and monitor for any changes, adjusting as needed.    Yes      DVT prophylaxis: Use SCD and TEDs. No anticoagulation as per Dr. Cox.    Betty Reis MD  Department of Hospital Medicine   [facility]     unknown

## 2022-06-29 ENCOUNTER — OFFICE VISIT (OUTPATIENT)
Dept: DERMATOLOGY | Facility: CLINIC | Age: 67
End: 2022-06-29
Payer: MEDICARE

## 2022-06-29 VITALS — HEIGHT: 68 IN | WEIGHT: 196 LBS | BODY MASS INDEX: 29.7 KG/M2

## 2022-06-29 DIAGNOSIS — L82.1 SEBORRHEIC KERATOSES: ICD-10-CM

## 2022-06-29 DIAGNOSIS — L82.0 INFLAMED SEBORRHEIC KERATOSIS: ICD-10-CM

## 2022-06-29 DIAGNOSIS — D22.9 MULTIPLE BENIGN NEVI: Primary | ICD-10-CM

## 2022-06-29 DIAGNOSIS — L90.5 SCAR: ICD-10-CM

## 2022-06-29 DIAGNOSIS — Z86.006 HISTORY OF MELANOMA IN SITU: ICD-10-CM

## 2022-06-29 DIAGNOSIS — D18.01 CHERRY ANGIOMA: ICD-10-CM

## 2022-06-29 PROCEDURE — 1159F PR MEDICATION LIST DOCUMENTED IN MEDICAL RECORD: ICD-10-PCS | Mod: CPTII,S$GLB,, | Performed by: DERMATOLOGY

## 2022-06-29 PROCEDURE — 1126F AMNT PAIN NOTED NONE PRSNT: CPT | Mod: CPTII,S$GLB,, | Performed by: DERMATOLOGY

## 2022-06-29 PROCEDURE — 3288F FALL RISK ASSESSMENT DOCD: CPT | Mod: CPTII,S$GLB,, | Performed by: DERMATOLOGY

## 2022-06-29 PROCEDURE — 17110 DESTRUCTION B9 LES UP TO 14: CPT | Mod: S$GLB,,, | Performed by: DERMATOLOGY

## 2022-06-29 PROCEDURE — 1160F RVW MEDS BY RX/DR IN RCRD: CPT | Mod: CPTII,S$GLB,, | Performed by: DERMATOLOGY

## 2022-06-29 PROCEDURE — 1126F PR PAIN SEVERITY QUANTIFIED, NO PAIN PRESENT: ICD-10-PCS | Mod: CPTII,S$GLB,, | Performed by: DERMATOLOGY

## 2022-06-29 PROCEDURE — 4010F ACE/ARB THERAPY RXD/TAKEN: CPT | Mod: CPTII,S$GLB,, | Performed by: DERMATOLOGY

## 2022-06-29 PROCEDURE — 1160F PR REVIEW ALL MEDS BY PRESCRIBER/CLIN PHARMACIST DOCUMENTED: ICD-10-PCS | Mod: CPTII,S$GLB,, | Performed by: DERMATOLOGY

## 2022-06-29 PROCEDURE — 3288F PR FALLS RISK ASSESSMENT DOCUMENTED: ICD-10-PCS | Mod: CPTII,S$GLB,, | Performed by: DERMATOLOGY

## 2022-06-29 PROCEDURE — 3008F BODY MASS INDEX DOCD: CPT | Mod: CPTII,S$GLB,, | Performed by: DERMATOLOGY

## 2022-06-29 PROCEDURE — 1101F PR PT FALLS ASSESS DOC 0-1 FALLS W/OUT INJ PAST YR: ICD-10-PCS | Mod: CPTII,S$GLB,, | Performed by: DERMATOLOGY

## 2022-06-29 PROCEDURE — 1157F ADVNC CARE PLAN IN RCRD: CPT | Mod: CPTII,S$GLB,, | Performed by: DERMATOLOGY

## 2022-06-29 PROCEDURE — 17110 PR DESTRUCTION BENIGN LESIONS UP TO 14: ICD-10-PCS | Mod: S$GLB,,, | Performed by: DERMATOLOGY

## 2022-06-29 PROCEDURE — 4010F PR ACE/ARB THEARPY RXD/TAKEN: ICD-10-PCS | Mod: CPTII,S$GLB,, | Performed by: DERMATOLOGY

## 2022-06-29 PROCEDURE — 99999 PR PBB SHADOW E&M-EST. PATIENT-LVL III: ICD-10-PCS | Mod: PBBFAC,,, | Performed by: DERMATOLOGY

## 2022-06-29 PROCEDURE — 1159F MED LIST DOCD IN RCRD: CPT | Mod: CPTII,S$GLB,, | Performed by: DERMATOLOGY

## 2022-06-29 PROCEDURE — 1157F PR ADVANCE CARE PLAN OR EQUIV PRESENT IN MEDICAL RECORD: ICD-10-PCS | Mod: CPTII,S$GLB,, | Performed by: DERMATOLOGY

## 2022-06-29 PROCEDURE — 99213 OFFICE O/P EST LOW 20 MIN: CPT | Mod: 25,S$GLB,, | Performed by: DERMATOLOGY

## 2022-06-29 PROCEDURE — 1101F PT FALLS ASSESS-DOCD LE1/YR: CPT | Mod: CPTII,S$GLB,, | Performed by: DERMATOLOGY

## 2022-06-29 PROCEDURE — 99999 PR PBB SHADOW E&M-EST. PATIENT-LVL III: CPT | Mod: PBBFAC,,, | Performed by: DERMATOLOGY

## 2022-06-29 PROCEDURE — 3008F PR BODY MASS INDEX (BMI) DOCUMENTED: ICD-10-PCS | Mod: CPTII,S$GLB,, | Performed by: DERMATOLOGY

## 2022-06-29 PROCEDURE — 99213 PR OFFICE/OUTPT VISIT, EST, LEVL III, 20-29 MIN: ICD-10-PCS | Mod: 25,S$GLB,, | Performed by: DERMATOLOGY

## 2022-06-29 NOTE — PROGRESS NOTES
Subjective:       Patient ID:  Mal Larkin is a 66 y.o. male who presents for   Chief Complaint   Patient presents with    Skin Check     TBSE    Spot     Right shoulder     LOV: 6/15/21 - AK, ISK, SK, lentigo, h/o MMIS    Skin Check - TBSE    C/o spot on right shoulder    Derm hx  H/o AKs treated with cryo  H/o MIS upper back 2011  + FH melanoma in father and both sisters     Rides bike 15 miles per day, uses sun protection. Plays a lot of golf.     Current Outpatient Medications:     ascorbic acid, vitamin C, (VITAMIN C) 500 MG tablet, Take 500 mg by mouth once daily., Disp: , Rfl:     calcium-vitamin D3 (OS-MAGGIE 500 + D3) 500 mg-5 mcg (200 unit) per tablet, Take 1 tablet by mouth Daily., Disp: , Rfl:     COQ10, LIPOSOMAL UBIQUINOL, ORAL, Take 1 capsule by mouth once daily., Disp: , Rfl:     cyanocobalamin (VITAMIN B-12) 1000 MCG tablet, Take 100 mcg by mouth once daily., Disp: , Rfl:     glucosamine-chondroitin 500-400 mg tablet, Take 1 tablet by mouth 3 (three) times daily., Disp: , Rfl:     KRILL OIL ORAL, Take 1 capsule by mouth once daily., Disp: , Rfl:     lisinopriL (PRINIVIL,ZESTRIL) 30 MG tablet, Take 1 tablet (30 mg total) by mouth once daily., Disp: 90 tablet, Rfl: 1    multivitamin (THERAGRAN) per tablet, Take 1 tablet by mouth once daily., Disp: , Rfl:     vitamin E 1000 UNIT capsule, Take 1,000 Units by mouth once daily., Disp: , Rfl:     VITAMIN K2 ORAL, Take by mouth., Disp: , Rfl:         Review of Systems   Constitutional: Negative for fever, chills, weight gain, fatigue, night sweats and malaise. Weight loss: bariatric surgery 5/2018 lost 135lbs.   HENT: Negative for headaches.    Respiratory: Negative for cough and shortness of breath.    Gastrointestinal: Negative for indigestion.   Skin: Positive for activity-related sunscreen use and wears hat. Negative for daily sunscreen use and recent sunburn.   Neurological: Negative for headaches.   Hematologic/Lymphatic: Negative  for adenopathy. Bruises/bleeds easily.        Objective:    Physical Exam   Constitutional: He appears well-developed and well-nourished. No distress.   Neurological: He is alert and oriented to person, place, and time. He is not disoriented.   Psychiatric: He has a normal mood and affect.   Skin:   Areas Examined (abnormalities noted in diagram):   Scalp / Hair Palpated and Inspected  Head / Face Inspection Performed  Neck Inspection Performed  Chest / Axilla Inspection Performed  Abdomen Inspection Performed  Genitals / Buttocks / Groin Inspection Performed  Back Inspection Performed  RUE Inspected  LUE Inspection Performed  RLE Inspected  LLE Inspection Performed  Nails and Digits Inspection Performed                       Diagram Legend     Erythematous scaling macule/papule c/w actinic keratosis       Vascular papule c/w angioma      Pigmented verrucoid papule/plaque c/w seborrheic keratosis      Yellow umbilicated papule c/w sebaceous hyperplasia      Irregularly shaped tan macule c/w lentigo     1-2 mm smooth white papules consistent with Milia      Movable subcutaneous cyst with punctum c/w epidermal inclusion cyst      Subcutaneous movable cyst c/w pilar cyst      Firm pink to brown papule c/w dermatofibroma      Pedunculated fleshy papule(s) c/w skin tag(s)      Evenly pigmented macule c/w junctional nevus     Mildly variegated pigmented, slightly irregular-bordered macule c/w mildly atypical nevus      Flesh colored to evenly pigmented papule c/w intradermal nevus       Pink pearly papule/plaque c/w basal cell carcinoma      Erythematous hyperkeratotic cursted plaque c/w SCC      Surgical scar with no sign of skin cancer recurrence      Open and closed comedones      Inflammatory papules and pustules      Verrucoid papule consistent consistent with wart     Erythematous eczematous patches and plaques     Dystrophic onycholytic nail with subungual debris c/w onychomycosis     Umbilicated papule     Erythematous-base heme-crusted tan verrucoid plaque consistent with inflamed seborrheic keratosis     Erythematous Silvery Scaling Plaque c/w Psoriasis     See annotation      Assessment / Plan:        Multiple benign nevi  Careful dermoscopy evaluation of nevi performed with none identified as needing biopsy today  Monitor for new mole or moles that are becoming bigger, darker, irritated, or developing irregular borders.     Seborrheic keratoses  These are benign inherited growths without a malignant potential. Reassurance given to patient. No treatment is necessary.     History of melanoma in situ  Scar  Area of previous melanoma examined. Site well healed with no signs of recurrence.    Total body skin examination performed today including at least 12 points as noted in physical examination. No lesions suspicious for malignancy noted.    Cherry angioma  This is a benign vascular lesion. Reassurance given. No treatment required.     Inflamed seborrheic keratosis  Left scalp, R infraorbital, bothersome to patient, requests treatment    Verbal consent obtained. 1 lesions (face) removed with shallow shave removal after anesthesia with 1% lidocaine with epinephrine. Hemostasis achieved with aluminum chloride and hyfrecation. No complications.  Cryosurgery procedure note:- scalp    Verbal consent from the patient is obtained. Liquid nitrogen cryosurgery is applied to 1 lesions to produce a freeze injury. The patient is aware that blisters may form and is instructed on wound care with gentle cleansing and use of vaseline ointment to keep moist until healed. The patient is supplied a handout on cryosurgery and is instructed to call if lesions do not completely resolve.    Patient instructed in importance in daily broad spectrum sun protection of at least spf 30. Mineral sunscreen ingredients preferred (Zinc +/- Titanium) and can be found OTC.   Recommend Elta MD for daily use on face and neck.  Patient encouraged to wear  hat for all outdoor exposure.   Also discussed sun avoidance and use of protective clothing.             Follow up in about 1 year (around 6/29/2023).

## 2022-07-14 ENCOUNTER — CLINICAL SUPPORT (OUTPATIENT)
Dept: FAMILY MEDICINE | Facility: CLINIC | Age: 67
End: 2022-07-14
Payer: MEDICARE

## 2022-07-14 ENCOUNTER — TELEPHONE (OUTPATIENT)
Dept: FAMILY MEDICINE | Facility: CLINIC | Age: 67
End: 2022-07-14

## 2022-07-14 VITALS — SYSTOLIC BLOOD PRESSURE: 144 MMHG | DIASTOLIC BLOOD PRESSURE: 86 MMHG

## 2022-07-14 DIAGNOSIS — Z01.30 BP CHECK: Primary | ICD-10-CM

## 2022-07-14 PROCEDURE — 99999 PR PBB SHADOW E&M-EST. PATIENT-LVL I: CPT | Mod: PBBFAC,,,

## 2022-07-14 PROCEDURE — 99999 PR PBB SHADOW E&M-EST. PATIENT-LVL I: ICD-10-PCS | Mod: PBBFAC,,,

## 2022-07-14 NOTE — PROGRESS NOTES
Mal Larkin 66 y.o. male is here today for Blood Pressure check.   History of HTN yes.    Review of patient's allergies indicates:  No Known Allergies  Creatinine   Date Value Ref Range Status   05/03/2022 0.7 0.5 - 1.4 mg/dL Final     Sodium   Date Value Ref Range Status   05/03/2022 136 136 - 145 mmol/L Final     Potassium   Date Value Ref Range Status   05/03/2022 4.6 3.5 - 5.1 mmol/L Final   ]  Patient verifies taking blood pressure medications on a regular basis at the same time of the day.     Current Outpatient Medications:     ascorbic acid, vitamin C, (VITAMIN C) 500 MG tablet, Take 500 mg by mouth once daily., Disp: , Rfl:     COQ10, LIPOSOMAL UBIQUINOL, ORAL, Take 1 capsule by mouth once daily., Disp: , Rfl:     cyanocobalamin (VITAMIN B-12) 1000 MCG tablet, Take 100 mcg by mouth once daily., Disp: , Rfl:     glucosamine-chondroitin 500-400 mg tablet, Take 1 tablet by mouth 3 (three) times daily., Disp: , Rfl:     KRILL OIL ORAL, Take 1 capsule by mouth once daily., Disp: , Rfl:     lisinopriL (PRINIVIL,ZESTRIL) 30 MG tablet, Take 1 tablet (30 mg total) by mouth once daily., Disp: 90 tablet, Rfl: 1    multivitamin (THERAGRAN) per tablet, Take 1 tablet by mouth once daily., Disp: , Rfl:     vitamin E 1000 UNIT capsule, Take 1,000 Units by mouth once daily., Disp: , Rfl:     VITAMIN K2 ORAL, Take by mouth., Disp: , Rfl:     calcium-vitamin D3 (OS-MAGGIE 500 + D3) 500 mg-5 mcg (200 unit) per tablet, Take 1 tablet by mouth Daily., Disp: , Rfl:   Does patient have record of home blood pressure readings no.     Patient is asymptomatic.     BP: (!) 148/88 ,   .    .Pt presented to clinic for BP follow-up. Initial BP was 148/88 after a minimum of 5 minutes rest, BP was 144/86. Pt denied SOB, nasuea, blurry vision, chest pain, and dizziness. Advised pt to continue to monitor and report to ER if symptoms occur. Pt verbalized understanding.

## 2022-07-14 NOTE — TELEPHONE ENCOUNTER
Current Outpatient Medications:     ascorbic acid, vitamin C, (VITAMIN C) 500 MG tablet, Take 500 mg by mouth once daily., Disp: , Rfl:     COQ10, LIPOSOMAL UBIQUINOL, ORAL, Take 1 capsule by mouth once daily., Disp: , Rfl:     cyanocobalamin (VITAMIN B-12) 1000 MCG tablet, Take 100 mcg by mouth once daily., Disp: , Rfl:     glucosamine-chondroitin 500-400 mg tablet, Take 1 tablet by mouth 3 (three) times daily., Disp: , Rfl:     KRILL OIL ORAL, Take 1 capsule by mouth once daily., Disp: , Rfl:     lisinopriL (PRINIVIL,ZESTRIL) 30 MG tablet, Take 1 tablet (30 mg total) by mouth once daily., Disp: 90 tablet, Rfl: 1    multivitamin (THERAGRAN) per tablet, Take 1 tablet by mouth once daily., Disp: , Rfl:     vitamin E 1000 UNIT capsule, Take 1,000 Units by mouth once daily., Disp: , Rfl:     VITAMIN K2 ORAL, Take by mouth., Disp: , Rfl:     calcium-vitamin D3 (OS-MAGGIE 500 + D3) 500 mg-5 mcg (200 unit) per tablet, Take 1 tablet by mouth Daily., Disp: , Rfl:   Does patient have record of home blood pressure readings no.     Patient is asymptomatic.     BP: (!) 148/88 ,   .    .Pt presented to clinic for BP follow-up. Initial BP was 148/88 after a minimum of 5 minutes rest, BP was 144/86. Pt denied SOB, nasuea, blurry vision, chest pain, and dizziness. Advised pt to continue to monitor and report to ER if symptoms occur. Pt verbalized understanding.

## 2022-09-06 ENCOUNTER — PATIENT MESSAGE (OUTPATIENT)
Dept: ADMINISTRATIVE | Facility: OTHER | Age: 67
End: 2022-09-06
Payer: MEDICARE

## 2023-01-01 ENCOUNTER — PATIENT MESSAGE (OUTPATIENT)
Dept: ADMINISTRATIVE | Facility: OTHER | Age: 68
End: 2023-01-01
Payer: MEDICARE

## 2023-02-09 DIAGNOSIS — Z00.00 ENCOUNTER FOR MEDICARE ANNUAL WELLNESS EXAM: ICD-10-CM

## 2023-03-09 ENCOUNTER — PATIENT MESSAGE (OUTPATIENT)
Dept: ADMINISTRATIVE | Facility: OTHER | Age: 68
End: 2023-03-09
Payer: MEDICARE

## 2023-05-01 ENCOUNTER — OFFICE VISIT (OUTPATIENT)
Dept: FAMILY MEDICINE | Facility: CLINIC | Age: 68
End: 2023-05-01
Attending: FAMILY MEDICINE
Payer: MEDICARE

## 2023-05-01 VITALS
HEART RATE: 62 BPM | OXYGEN SATURATION: 99 % | SYSTOLIC BLOOD PRESSURE: 128 MMHG | RESPIRATION RATE: 17 BRPM | TEMPERATURE: 98 F | BODY MASS INDEX: 28.55 KG/M2 | HEIGHT: 68 IN | DIASTOLIC BLOOD PRESSURE: 76 MMHG | WEIGHT: 188.38 LBS

## 2023-05-01 DIAGNOSIS — Z12.5 PROSTATE CANCER SCREENING: ICD-10-CM

## 2023-05-01 DIAGNOSIS — Z86.010 HISTORY OF COLON POLYPS: ICD-10-CM

## 2023-05-01 DIAGNOSIS — Z98.890 HISTORY OF LUMBAR LAMINECTOMY: ICD-10-CM

## 2023-05-01 DIAGNOSIS — I10 PRIMARY HYPERTENSION: ICD-10-CM

## 2023-05-01 DIAGNOSIS — Z13.1 DIABETES MELLITUS SCREENING: ICD-10-CM

## 2023-05-01 DIAGNOSIS — Z98.84 HISTORY OF BARIATRIC SURGERY: ICD-10-CM

## 2023-05-01 DIAGNOSIS — I70.0 AORTIC ATHEROSCLEROSIS: ICD-10-CM

## 2023-05-01 DIAGNOSIS — Z00.00 ENCOUNTER FOR PREVENTIVE HEALTH EXAMINATION: Primary | ICD-10-CM

## 2023-05-01 DIAGNOSIS — K76.0 FATTY LIVER: ICD-10-CM

## 2023-05-01 DIAGNOSIS — M48.062 SPINAL STENOSIS, LUMBAR REGION, WITH NEUROGENIC CLAUDICATION: ICD-10-CM

## 2023-05-01 DIAGNOSIS — F17.210 CIGARETTE NICOTINE DEPENDENCE WITHOUT COMPLICATION: ICD-10-CM

## 2023-05-01 PROBLEM — J84.10 CALCIFIED GRANULOMA OF LUNG: Status: RESOLVED | Noted: 2023-05-01 | Resolved: 2023-05-01

## 2023-05-01 PROBLEM — J98.4 CALCIFIED GRANULOMA OF LUNG: Status: RESOLVED | Noted: 2023-05-01 | Resolved: 2023-05-01

## 2023-05-01 PROBLEM — J98.4 CALCIFIED GRANULOMA OF LUNG: Status: ACTIVE | Noted: 2023-05-01

## 2023-05-01 PROBLEM — J84.10 CALCIFIED GRANULOMA OF LUNG: Status: ACTIVE | Noted: 2023-05-01

## 2023-05-01 PROCEDURE — 1126F AMNT PAIN NOTED NONE PRSNT: CPT | Mod: HCNC,CPTII,S$GLB, | Performed by: FAMILY MEDICINE

## 2023-05-01 PROCEDURE — 1160F PR REVIEW ALL MEDS BY PRESCRIBER/CLIN PHARMACIST DOCUMENTED: ICD-10-PCS | Mod: HCNC,CPTII,S$GLB, | Performed by: FAMILY MEDICINE

## 2023-05-01 PROCEDURE — 1157F ADVNC CARE PLAN IN RCRD: CPT | Mod: HCNC,CPTII,S$GLB, | Performed by: FAMILY MEDICINE

## 2023-05-01 PROCEDURE — 99999 PR PBB SHADOW E&M-EST. PATIENT-LVL IV: CPT | Mod: PBBFAC,HCNC,, | Performed by: FAMILY MEDICINE

## 2023-05-01 PROCEDURE — 99397 PR PREVENTIVE VISIT,EST,65 & OVER: ICD-10-PCS | Mod: HCNC,S$GLB,, | Performed by: FAMILY MEDICINE

## 2023-05-01 PROCEDURE — 4010F PR ACE/ARB THEARPY RXD/TAKEN: ICD-10-PCS | Mod: HCNC,CPTII,S$GLB, | Performed by: FAMILY MEDICINE

## 2023-05-01 PROCEDURE — 1160F RVW MEDS BY RX/DR IN RCRD: CPT | Mod: HCNC,CPTII,S$GLB, | Performed by: FAMILY MEDICINE

## 2023-05-01 PROCEDURE — 99999 PR PBB SHADOW E&M-EST. PATIENT-LVL IV: ICD-10-PCS | Mod: PBBFAC,HCNC,, | Performed by: FAMILY MEDICINE

## 2023-05-01 PROCEDURE — 3078F PR MOST RECENT DIASTOLIC BLOOD PRESSURE < 80 MM HG: ICD-10-PCS | Mod: HCNC,CPTII,S$GLB, | Performed by: FAMILY MEDICINE

## 2023-05-01 PROCEDURE — 3074F PR MOST RECENT SYSTOLIC BLOOD PRESSURE < 130 MM HG: ICD-10-PCS | Mod: HCNC,CPTII,S$GLB, | Performed by: FAMILY MEDICINE

## 2023-05-01 PROCEDURE — 3078F DIAST BP <80 MM HG: CPT | Mod: HCNC,CPTII,S$GLB, | Performed by: FAMILY MEDICINE

## 2023-05-01 PROCEDURE — 1101F PT FALLS ASSESS-DOCD LE1/YR: CPT | Mod: HCNC,CPTII,S$GLB, | Performed by: FAMILY MEDICINE

## 2023-05-01 PROCEDURE — 1159F MED LIST DOCD IN RCRD: CPT | Mod: HCNC,CPTII,S$GLB, | Performed by: FAMILY MEDICINE

## 2023-05-01 PROCEDURE — 1157F PR ADVANCE CARE PLAN OR EQUIV PRESENT IN MEDICAL RECORD: ICD-10-PCS | Mod: HCNC,CPTII,S$GLB, | Performed by: FAMILY MEDICINE

## 2023-05-01 PROCEDURE — 1126F PR PAIN SEVERITY QUANTIFIED, NO PAIN PRESENT: ICD-10-PCS | Mod: HCNC,CPTII,S$GLB, | Performed by: FAMILY MEDICINE

## 2023-05-01 PROCEDURE — 4010F ACE/ARB THERAPY RXD/TAKEN: CPT | Mod: HCNC,CPTII,S$GLB, | Performed by: FAMILY MEDICINE

## 2023-05-01 PROCEDURE — 3074F SYST BP LT 130 MM HG: CPT | Mod: HCNC,CPTII,S$GLB, | Performed by: FAMILY MEDICINE

## 2023-05-01 PROCEDURE — 3288F PR FALLS RISK ASSESSMENT DOCUMENTED: ICD-10-PCS | Mod: HCNC,CPTII,S$GLB, | Performed by: FAMILY MEDICINE

## 2023-05-01 PROCEDURE — 3008F BODY MASS INDEX DOCD: CPT | Mod: HCNC,CPTII,S$GLB, | Performed by: FAMILY MEDICINE

## 2023-05-01 PROCEDURE — 3008F PR BODY MASS INDEX (BMI) DOCUMENTED: ICD-10-PCS | Mod: HCNC,CPTII,S$GLB, | Performed by: FAMILY MEDICINE

## 2023-05-01 PROCEDURE — 1101F PR PT FALLS ASSESS DOC 0-1 FALLS W/OUT INJ PAST YR: ICD-10-PCS | Mod: HCNC,CPTII,S$GLB, | Performed by: FAMILY MEDICINE

## 2023-05-01 PROCEDURE — 3288F FALL RISK ASSESSMENT DOCD: CPT | Mod: HCNC,CPTII,S$GLB, | Performed by: FAMILY MEDICINE

## 2023-05-01 PROCEDURE — 1159F PR MEDICATION LIST DOCUMENTED IN MEDICAL RECORD: ICD-10-PCS | Mod: HCNC,CPTII,S$GLB, | Performed by: FAMILY MEDICINE

## 2023-05-01 PROCEDURE — 99397 PER PM REEVAL EST PAT 65+ YR: CPT | Mod: HCNC,S$GLB,, | Performed by: FAMILY MEDICINE

## 2023-05-01 RX ORDER — LISINOPRIL 40 MG/1
40 TABLET ORAL DAILY
Qty: 90 TABLET | Refills: 3 | Status: SHIPPED | OUTPATIENT
Start: 2023-05-01

## 2023-05-01 NOTE — PROGRESS NOTES
Subjective:       Patient ID: Mal Larkin is a 67 y.o. male.    Chief Complaint: Annual Exam (Pt states that he is here for his annual exam)    67-year-old male coming in for annual exam.  He has no active complaints at this time.  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  He has hypertension on lisinopril 40 mg daily, melanoma excision in 2011 with no sign of recurrence, fatty liver disease, adenomatous colon polyps and Dupuytren's contracture.  He status post gastric sleeve surgery and his last colonoscopy was done January 20, 2020 by Dr. Hayes with a five year recheck due in January 2025.  The patient is wearing hearing aids, he is due for a low-dose CT scan having quit smoking nine years ago.  His PSA will be due May 3rd.    Past Medical History:  No date: Adenomatous colon polyp      Comment:  + dysplasia  No date: Colon polyp  No date: Fatty liver  2011: Melanoma      Comment:  in situ - upper back  6/19/2012: Primary hypertension    Past Surgical History:  No date: APPENDECTOMY  No date: BACK SURGERY      Comment:  transverse process fracture  11/11/2013: COLONOSCOPY      Comment:  Dr. Hayes, 3 year recheck  12/20/2016: COLONOSCOPY; N/A      Comment:  Procedure: COLONOSCOPY;  Surgeon: Atilio Hayes MD;                Location: [facility];  Service: Endoscopy;  Laterality:                N/A;  1/20/2020: COLONOSCOPY; N/A      Comment:  Procedure: COLONOSCOPY;  Surgeon: Atilio Hayes MD;                Location: [facility];  Service: Endoscopy;  Laterality:                N/A;  12/11/2018: FUSION OF LUMBAR SPINE USING POSTERIOR INTERBODY   TECHNIQUE; N/A      Comment:  Procedure: FUSION, SPINE, LUMBAR, PLIF L1-2, L2-3, L3-4,               L4-5, L5-S1;  Surgeon: Vik Cox MD;  Location:                [facility];  Service: Orthopedics;  Laterality: N/A;  No date:  GASTRIC SLEEVE  No date: HAND SURGERY      Comment:  right dupuytrens release on 13: LUMBAR LAMINECTOMY  2018: LUMBAR LAMINECTOMY; N/A      Comment:  Procedure: LAMINECTOMY, SPINE, LUMBAR L1, L2, L3 with                 L4-L5 Re-exploration;  Surgeon: Vik Cox MD;                 Location: [facility];  Service: Orthopedics;  Laterality:                N/A;  : melanoma removal  2018: SPINE SURGERY      Comment:  Dr Cox L1 - S1. lamanectomy/fusion  No date: WISDOM TOOTH EXTRACTION    Review of patient's family history indicates:    Social History    Tobacco Use      Smoking status: Former        Packs/day: 1.00        Years: 30.00        Pack years: 30        Types: Cigarettes        Quit date: 2014        Years since quittin.9      Smokeless tobacco: Former        Types: Chew        Quit date: 1979    Alcohol use: Yes      Alcohol/week: 10.0 standard drinks      Types: 12 Standard drinks or equivalent per week      Comment: Drinks beer only and mostly during football season    Drug use: No    Current Outpatient Medications on File Prior to Visit:  ascorbic acid, vitamin C, (VITAMIN C) 500 MG tablet, Take 500 mg by mouth once daily., Disp: , Rfl:   COQ10, LIPOSOMAL UBIQUINOL, ORAL, Take 1 capsule by mouth once daily., Disp: , Rfl:   cyanocobalamin (VITAMIN B-12) 1000 MCG tablet, Take 100 mcg by mouth once daily., Disp: , Rfl:   glucosamine-chondroitin 500-400 mg tablet, Take 1 tablet by mouth 3 (three) times daily., Disp: , Rfl:   KRILL OIL ORAL, Take 1 capsule by mouth once daily., Disp: , Rfl:   multivitamin (THERAGRAN) per tablet, Take 1 tablet by mouth once daily., Disp: , Rfl:   vitamin E 1000 UNIT capsule, Take 1,000 Units by mouth once daily., Disp: , Rfl:   VITAMIN K2 ORAL, Take by mouth., Disp: , Rfl:   [DISCONTINUED] lisinopriL (PRINIVIL,ZESTRIL) 40 MG tablet, TAKE 1 TABLET BY MOUTH EVERY DAY, Disp: 90 tablet, Rfl: 1  calcium-vitamin D3 (OS-MAGGIE 500 + D3) 500  mg-5 mcg (200 unit) per tablet, Take 1 tablet by mouth Daily., Disp: , Rfl:     No current facility-administered medications on file prior to visit.        Review of Systems   Constitutional:  Negative for activity change, chills, fatigue and fever.   HENT:  Negative for congestion, ear pain, rhinorrhea and sore throat.    Eyes:  Negative for visual disturbance.   Respiratory:  Negative for cough, chest tightness and shortness of breath.    Cardiovascular:  Negative for chest pain and palpitations.   Gastrointestinal:  Negative for abdominal pain, blood in stool, constipation, diarrhea, nausea and vomiting.   Genitourinary:  Negative for difficulty urinating, dysuria and hematuria.   Musculoskeletal:  Negative for arthralgias and neck pain.   Skin:  Negative for rash.   Neurological:  Negative for dizziness and headaches.   Psychiatric/Behavioral:  Negative for sleep disturbance.      Objective:      Physical Exam  Vitals and nursing note reviewed.   Constitutional:       General: He is not in acute distress.     Appearance: Normal appearance. He is well-developed and normal weight. He is not ill-appearing, toxic-appearing or diaphoretic.      Comments: Initial blood pressure elevated but on recheck he came down to the normal level and consistent with his records in the digital hypertension clinic   Mildly overweight with a BMI of 28.6 he is down 8.7 lb from his last physical April 29, 2022   HENT:      Head: Normocephalic and atraumatic.      Right Ear: Tympanic membrane, ear canal and external ear normal. There is no impacted cerumen.      Left Ear: Tympanic membrane, ear canal and external ear normal. There is no impacted cerumen.      Nose: Nose normal. No congestion or rhinorrhea.      Mouth/Throat:      Mouth: Mucous membranes are moist.      Pharynx: Oropharynx is clear. No oropharyngeal exudate or posterior oropharyngeal erythema.   Eyes:      General: No scleral icterus.     Extraocular Movements:  Extraocular movements intact.      Conjunctiva/sclera: Conjunctivae normal.      Pupils: Pupils are equal, round, and reactive to light.   Neck:      Thyroid: No thyromegaly.      Vascular: No carotid bruit or JVD.      Trachea: No tracheal deviation.   Cardiovascular:      Rate and Rhythm: Normal rate and regular rhythm.      Pulses: Normal pulses.      Heart sounds: Normal heart sounds. No murmur heard.    No friction rub. No gallop.   Pulmonary:      Effort: Pulmonary effort is normal. No respiratory distress.      Breath sounds: Normal breath sounds. No stridor. No wheezing, rhonchi or rales.   Chest:      Chest wall: No tenderness.   Abdominal:      General: Bowel sounds are normal. There is no distension.      Palpations: Abdomen is soft. There is no mass.      Tenderness: There is no abdominal tenderness. There is no guarding or rebound.      Hernia: No hernia is present.   Musculoskeletal:         General: No swelling, tenderness, deformity or signs of injury. Normal range of motion.      Cervical back: Normal range of motion and neck supple. No rigidity or tenderness.      Right lower leg: No edema.      Left lower leg: No edema.   Lymphadenopathy:      Cervical: No cervical adenopathy.   Skin:     General: Skin is warm and dry.      Coloration: Skin is not jaundiced or pale.      Findings: No bruising, erythema, lesion or rash.   Neurological:      General: No focal deficit present.      Mental Status: He is alert and oriented to person, place, and time. Mental status is at baseline.      Cranial Nerves: No cranial nerve deficit.      Sensory: No sensory deficit.      Motor: No weakness.      Coordination: Coordination normal.      Gait: Gait normal.      Deep Tendon Reflexes: Reflexes are normal and symmetric. Reflexes normal (Slight imbalance with right knee less reactive than left knee probably secondary to his back surgery).   Psychiatric:         Mood and Affect: Mood normal.         Behavior:  Behavior normal.         Thought Content: Thought content normal.         Judgment: Judgment normal.       Assessment:       1. Encounter for preventive health examination    2. Primary hypertension    3. History of bariatric surgery    4. History of colon polyps    5. Spinal stenosis, lumbar region, with neurogenic claudication    6. History of lumbar laminectomy    7. Fatty liver    8. Aortic atherosclerosis    9. Diabetes mellitus screening    10. Cigarette nicotine dependence without complication    11. Prostate cancer screening    12. BMI 28.0-28.9,adult        Plan:       1. Encounter for preventive health examination  - Comprehensive Metabolic Panel; Future  - Lipid Panel; Future  - PSA, Screening; Future  - CBC Auto Differential; Future    2. Primary hypertension  Good control no changes needed on lisinopril 40 mg refill sent to [facility]  - Comprehensive Metabolic Panel; Future  - Lipid Panel; Future  - CBC Auto Differential; Future  - lisinopriL (PRINIVIL,ZESTRIL) 40 MG tablet; Take 1 tablet (40 mg total) by mouth once daily.  Dispense: 90 tablet; Refill: 3    3. History of bariatric surgery  Doing well and maintaining    4. History of colon polyps  Colonoscopy next due January 2025 with Dr. Hayes    5. Spinal stenosis, lumbar region, with neurogenic claudication  Status post lumbar laminectomy and fusion.    6. History of lumbar laminectomy  Stable    7. Fatty liver  Await chemistry panel results, much improved post bariatric surgery  - Comprehensive Metabolic Panel; Future    8. Aortic atherosclerosis  Maintain good cholesterol control, blood pressure control, and watch for diabetes    9. Diabetes mellitus screening  A1c ordered but his insurance did not cover it so it was deleted    10. Cigarette nicotine dependence without complication  Low-dose CT scan scheduled  - CT Chest Lung Screening Low Dose; Future    11. Prostate cancer screening  Await PSA result  - PSA, Screening; Future    12. BMI  28.0-28.9,adult  Good weight, patient continues to bicycle regularly and should continue to lose weight

## 2023-05-03 ENCOUNTER — PES CALL (OUTPATIENT)
Dept: ADMINISTRATIVE | Facility: CLINIC | Age: 68
End: 2023-05-03
Payer: MEDICARE

## 2023-05-04 ENCOUNTER — HOSPITAL ENCOUNTER (OUTPATIENT)
Dept: RADIOLOGY | Facility: HOSPITAL | Age: 68
Discharge: HOME | End: 2023-05-04
Attending: FAMILY MEDICINE
Payer: MEDICARE

## 2023-05-04 DIAGNOSIS — F17.210 CIGARETTE NICOTINE DEPENDENCE WITHOUT COMPLICATION: ICD-10-CM

## 2023-05-04 PROCEDURE — 71271 CT THORAX LUNG CANCER SCR C-: CPT | Mod: TC,PO

## 2023-06-29 ENCOUNTER — TELEPHONE (OUTPATIENT)
Dept: FAMILY MEDICINE | Facility: CLINIC | Age: 68
End: 2023-06-29
Payer: MEDICARE

## 2023-06-29 ENCOUNTER — OFFICE VISIT (OUTPATIENT)
Dept: DERMATOLOGY | Facility: CLINIC | Age: 68
End: 2023-06-29
Payer: MEDICARE

## 2023-06-29 DIAGNOSIS — M67.449 DIGITAL MUCINOUS CYST OF FINGER: ICD-10-CM

## 2023-06-29 DIAGNOSIS — L82.0 INFLAMED SEBORRHEIC KERATOSIS: Primary | ICD-10-CM

## 2023-06-29 DIAGNOSIS — M67.442 DIGITAL MUCINOUS CYST OF FINGER OF LEFT HAND: Primary | ICD-10-CM

## 2023-06-29 DIAGNOSIS — D18.01 CHERRY ANGIOMA: ICD-10-CM

## 2023-06-29 DIAGNOSIS — L81.4 SOLAR LENTIGO: ICD-10-CM

## 2023-06-29 DIAGNOSIS — Z85.820 HISTORY OF MALIGNANT MELANOMA OF SKIN: ICD-10-CM

## 2023-06-29 DIAGNOSIS — L82.1 SEBORRHEIC KERATOSES: ICD-10-CM

## 2023-06-29 DIAGNOSIS — D22.9 MULTIPLE BENIGN NEVI: ICD-10-CM

## 2023-06-29 PROCEDURE — 1160F RVW MEDS BY RX/DR IN RCRD: CPT | Mod: CPTII,S$GLB,, | Performed by: DERMATOLOGY

## 2023-06-29 PROCEDURE — 17110 DESTRUCTION B9 LES UP TO 14: CPT | Mod: S$GLB,,, | Performed by: DERMATOLOGY

## 2023-06-29 PROCEDURE — 4010F ACE/ARB THERAPY RXD/TAKEN: CPT | Mod: CPTII,S$GLB,, | Performed by: DERMATOLOGY

## 2023-06-29 PROCEDURE — 99213 PR OFFICE/OUTPT VISIT, EST, LEVL III, 20-29 MIN: ICD-10-PCS | Mod: 25,S$GLB,, | Performed by: DERMATOLOGY

## 2023-06-29 PROCEDURE — 1126F PR PAIN SEVERITY QUANTIFIED, NO PAIN PRESENT: ICD-10-PCS | Mod: CPTII,S$GLB,, | Performed by: DERMATOLOGY

## 2023-06-29 PROCEDURE — 99213 OFFICE O/P EST LOW 20 MIN: CPT | Mod: 25,S$GLB,, | Performed by: DERMATOLOGY

## 2023-06-29 PROCEDURE — 1159F MED LIST DOCD IN RCRD: CPT | Mod: CPTII,S$GLB,, | Performed by: DERMATOLOGY

## 2023-06-29 PROCEDURE — 1159F PR MEDICATION LIST DOCUMENTED IN MEDICAL RECORD: ICD-10-PCS | Mod: CPTII,S$GLB,, | Performed by: DERMATOLOGY

## 2023-06-29 PROCEDURE — 1101F PT FALLS ASSESS-DOCD LE1/YR: CPT | Mod: CPTII,S$GLB,, | Performed by: DERMATOLOGY

## 2023-06-29 PROCEDURE — 1157F ADVNC CARE PLAN IN RCRD: CPT | Mod: CPTII,S$GLB,, | Performed by: DERMATOLOGY

## 2023-06-29 PROCEDURE — 3288F FALL RISK ASSESSMENT DOCD: CPT | Mod: CPTII,S$GLB,, | Performed by: DERMATOLOGY

## 2023-06-29 PROCEDURE — 4010F PR ACE/ARB THEARPY RXD/TAKEN: ICD-10-PCS | Mod: CPTII,S$GLB,, | Performed by: DERMATOLOGY

## 2023-06-29 PROCEDURE — 3288F PR FALLS RISK ASSESSMENT DOCUMENTED: ICD-10-PCS | Mod: CPTII,S$GLB,, | Performed by: DERMATOLOGY

## 2023-06-29 PROCEDURE — 1126F AMNT PAIN NOTED NONE PRSNT: CPT | Mod: CPTII,S$GLB,, | Performed by: DERMATOLOGY

## 2023-06-29 PROCEDURE — 17110 PR DESTRUCTION BENIGN LESIONS UP TO 14: ICD-10-PCS | Mod: S$GLB,,, | Performed by: DERMATOLOGY

## 2023-06-29 PROCEDURE — 1101F PR PT FALLS ASSESS DOC 0-1 FALLS W/OUT INJ PAST YR: ICD-10-PCS | Mod: CPTII,S$GLB,, | Performed by: DERMATOLOGY

## 2023-06-29 PROCEDURE — 1160F PR REVIEW ALL MEDS BY PRESCRIBER/CLIN PHARMACIST DOCUMENTED: ICD-10-PCS | Mod: CPTII,S$GLB,, | Performed by: DERMATOLOGY

## 2023-06-29 PROCEDURE — 1157F PR ADVANCE CARE PLAN OR EQUIV PRESENT IN MEDICAL RECORD: ICD-10-PCS | Mod: CPTII,S$GLB,, | Performed by: DERMATOLOGY

## 2023-06-29 NOTE — TELEPHONE ENCOUNTER
Dr. Martinez's notes do not mention anything about the digital mucinous cyst instructions.  I do not believe General surgery deals with these, they usually do not do anything on the hands.  Please check with them and confirm, otherwise we will have to send to Dr. Alaniz

## 2023-06-29 NOTE — TELEPHONE ENCOUNTER
Patient asking for gen surg referral- saw Dr. Martinez today and has a digital mucus cyst of left 3rd finger.

## 2023-06-29 NOTE — PROGRESS NOTES
Subjective:      Patient ID:  Mal Larkin is a 67 y.o. male who presents for   Chief Complaint   Patient presents with    Spot     Left middle finger     LOV 6/29/22 - Benign nevi, scar, Hx of MIS    Patient here today for skin check TBSE  Complains of spot on left middle finger x over 1 month. States he cut it on cardboard. Painful.    Derm hx  H/o AKs treated with cryo  H/o MIS upper back 2011  + FH melanoma in father and both sisters     Rides bike 15 miles per day, uses sun protection. Plays a lot of golf.       Current Outpatient Medications:   ·  ascorbic acid, vitamin C, (VITAMIN C) 500 MG tablet, Take 500 mg by mouth once daily., Disp: , Rfl:   ·  calcium-vitamin D3 (OS-MAGGIE 500 + D3) 500 mg-5 mcg (200 unit) per tablet, Take 1 tablet by mouth Daily., Disp: , Rfl:   ·  COQ10, LIPOSOMAL UBIQUINOL, ORAL, Take 1 capsule by mouth once daily., Disp: , Rfl:   ·  cyanocobalamin (VITAMIN B-12) 1000 MCG tablet, Take 100 mcg by mouth once daily., Disp: , Rfl:   ·  glucosamine-chondroitin 500-400 mg tablet, Take 1 tablet by mouth 3 (three) times daily., Disp: , Rfl:   ·  KRILL OIL ORAL, Take 1 capsule by mouth once daily., Disp: , Rfl:   ·  lisinopriL (PRINIVIL,ZESTRIL) 40 MG tablet, Take 1 tablet (40 mg total) by mouth once daily., Disp: 90 tablet, Rfl: 3  ·  multivitamin (THERAGRAN) per tablet, Take 1 tablet by mouth once daily., Disp: , Rfl:   ·  vitamin E 1000 UNIT capsule, Take 1,000 Units by mouth once daily., Disp: , Rfl:   ·  VITAMIN K2 ORAL, Take by mouth., Disp: , Rfl:         Review of Systems   Constitutional:  Negative for fever, chills, weight gain, fatigue, night sweats and malaise. Weight loss: bariatric surgery 5/2018 lost 135lbs.  HENT:  Negative for headaches.    Respiratory:  Negative for cough and shortness of breath.    Gastrointestinal:  Negative for indigestion.   Skin:  Positive for activity-related sunscreen use and wears hat. Negative for daily sunscreen use and recent sunburn.    Neurological:  Negative for headaches.   Hematologic/Lymphatic: Negative for adenopathy. Bruises/bleeds easily.     Objective:   Physical Exam   Constitutional: He appears well-developed and well-nourished. No distress.   Neurological: He is alert and oriented to person, place, and time. He is not disoriented.   Psychiatric: He has a normal mood and affect.   Skin:   Areas Examined (abnormalities noted in diagram):   Scalp / Hair Palpated and Inspected  Head / Face Inspection Performed  Neck Inspection Performed  Chest / Axilla Inspection Performed  Abdomen Inspection Performed  Genitals / Buttocks / Groin Inspection Performed  Back Inspection Performed  RUE Inspected  LUE Inspection Performed  RLE Inspected  LLE Inspection Performed  Nails and Digits Inspection Performed                   Diagram Legend     Erythematous scaling macule/papule c/w actinic keratosis       Vascular papule c/w angioma      Pigmented verrucoid papule/plaque c/w seborrheic keratosis      Yellow umbilicated papule c/w sebaceous hyperplasia      Irregularly shaped tan macule c/w lentigo     1-2 mm smooth white papules consistent with Milia      Movable subcutaneous cyst with punctum c/w epidermal inclusion cyst      Subcutaneous movable cyst c/w pilar cyst      Firm pink to brown papule c/w dermatofibroma      Pedunculated fleshy papule(s) c/w skin tag(s)      Evenly pigmented macule c/w junctional nevus     Mildly variegated pigmented, slightly irregular-bordered macule c/w mildly atypical nevus      Flesh colored to evenly pigmented papule c/w intradermal nevus       Pink pearly papule/plaque c/w basal cell carcinoma      Erythematous hyperkeratotic cursted plaque c/w SCC      Surgical scar with no sign of skin cancer recurrence      Open and closed comedones      Inflammatory papules and pustules      Verrucoid papule consistent consistent with wart     Erythematous eczematous patches and plaques     Dystrophic onycholytic nail with  subungual debris c/w onychomycosis     Umbilicated papule    Erythematous-base heme-crusted tan verrucoid plaque consistent with inflamed seborrheic keratosis     Erythematous Silvery Scaling Plaque c/w Psoriasis     See annotation      Assessment / Plan:        Inflamed seborrheic keratoses, scalp, injured by shaving scalp  Verbal consent obtained. 2 lesion(s) destroyed with electrodesiccation after anesthesia with 1% lidocaine with epinephrine. No complications.    Seborrheic keratoses  These are benign inherited growths without a malignant potential. Reassurance given to patient. No treatment is necessary.     Solar lentigo  This is a benign hyperpigmented sun induced lesion. Daily sun protection will reduce the number of new lesions. Treatment of these benign lesions are considered cosmetic.    Multiple benign nevi  Careful dermoscopy evaluation of nevi performed with none identified as needing biopsy today  Monitor for new mole or moles that are becoming bigger, darker, irritated, or developing irregular borders.     Cherry angioma  This is a benign vascular lesion. Reassurance given. No treatment required.     History of malignant melanoma of skin  Area of previous melanoma (back) examined. Site well healed with no signs of recurrence.    Total body skin examination performed today including at least 12 points as noted in physical examination. No lesions suspicious for malignancy noted.    Patient instructed in importance in daily broad spectrum sun protection of at least spf 30. Mineral sunscreen ingredients preferred (Zinc +/- Titanium) and can be found OTC.   Recommend Elta MD for daily use on face and neck.  Patient encouraged to wear hat for all outdoor exposure.   Also discussed sun avoidance and use of protective clothing.           Follow up in about 1 year (around 6/29/2024).

## 2023-06-29 NOTE — TELEPHONE ENCOUNTER
----- Message from Celio Bustos sent at 6/29/2023  8:35 AM CDT -----  Contact: self  Type: Needs Medical Advice  Who Called:  pt    Best Call Back Number: [phone]    Additional Information: Pt would like to speak to Ms Muñoz  Thank you

## 2023-07-14 ENCOUNTER — OFFICE VISIT (OUTPATIENT)
Dept: ORTHOPEDICS | Facility: CLINIC | Age: 68
End: 2023-07-14
Attending: FAMILY MEDICINE
Payer: MEDICARE

## 2023-07-14 DIAGNOSIS — M67.442 DIGITAL MUCINOUS CYST OF FINGER OF LEFT HAND: ICD-10-CM

## 2023-07-14 PROCEDURE — 3288F FALL RISK ASSESSMENT DOCD: CPT | Mod: HCNC,CPTII,S$GLB, | Performed by: ORTHOPAEDIC SURGERY

## 2023-07-14 PROCEDURE — 1157F PR ADVANCE CARE PLAN OR EQUIV PRESENT IN MEDICAL RECORD: ICD-10-PCS | Mod: HCNC,CPTII,S$GLB, | Performed by: ORTHOPAEDIC SURGERY

## 2023-07-14 PROCEDURE — 99999 PR PBB SHADOW E&M-EST. PATIENT-LVL III: CPT | Mod: PBBFAC,HCNC,, | Performed by: ORTHOPAEDIC SURGERY

## 2023-07-14 PROCEDURE — 1159F MED LIST DOCD IN RCRD: CPT | Mod: HCNC,CPTII,S$GLB, | Performed by: ORTHOPAEDIC SURGERY

## 2023-07-14 PROCEDURE — 4010F PR ACE/ARB THEARPY RXD/TAKEN: ICD-10-PCS | Mod: HCNC,CPTII,S$GLB, | Performed by: ORTHOPAEDIC SURGERY

## 2023-07-14 PROCEDURE — 1126F PR PAIN SEVERITY QUANTIFIED, NO PAIN PRESENT: ICD-10-PCS | Mod: HCNC,CPTII,S$GLB, | Performed by: ORTHOPAEDIC SURGERY

## 2023-07-14 PROCEDURE — 4010F ACE/ARB THERAPY RXD/TAKEN: CPT | Mod: HCNC,CPTII,S$GLB, | Performed by: ORTHOPAEDIC SURGERY

## 2023-07-14 PROCEDURE — 1159F PR MEDICATION LIST DOCUMENTED IN MEDICAL RECORD: ICD-10-PCS | Mod: HCNC,CPTII,S$GLB, | Performed by: ORTHOPAEDIC SURGERY

## 2023-07-14 PROCEDURE — 1101F PR PT FALLS ASSESS DOC 0-1 FALLS W/OUT INJ PAST YR: ICD-10-PCS | Mod: HCNC,CPTII,S$GLB, | Performed by: ORTHOPAEDIC SURGERY

## 2023-07-14 PROCEDURE — 1157F ADVNC CARE PLAN IN RCRD: CPT | Mod: HCNC,CPTII,S$GLB, | Performed by: ORTHOPAEDIC SURGERY

## 2023-07-14 PROCEDURE — 99203 PR OFFICE/OUTPT VISIT, NEW, LEVL III, 30-44 MIN: ICD-10-PCS | Mod: HCNC,S$GLB,, | Performed by: ORTHOPAEDIC SURGERY

## 2023-07-14 PROCEDURE — 99203 OFFICE O/P NEW LOW 30 MIN: CPT | Mod: HCNC,S$GLB,, | Performed by: ORTHOPAEDIC SURGERY

## 2023-07-14 PROCEDURE — 1126F AMNT PAIN NOTED NONE PRSNT: CPT | Mod: HCNC,CPTII,S$GLB, | Performed by: ORTHOPAEDIC SURGERY

## 2023-07-14 PROCEDURE — 3288F PR FALLS RISK ASSESSMENT DOCUMENTED: ICD-10-PCS | Mod: HCNC,CPTII,S$GLB, | Performed by: ORTHOPAEDIC SURGERY

## 2023-07-14 PROCEDURE — 1101F PT FALLS ASSESS-DOCD LE1/YR: CPT | Mod: HCNC,CPTII,S$GLB, | Performed by: ORTHOPAEDIC SURGERY

## 2023-07-14 PROCEDURE — 99999 PR PBB SHADOW E&M-EST. PATIENT-LVL III: ICD-10-PCS | Mod: PBBFAC,HCNC,, | Performed by: ORTHOPAEDIC SURGERY

## 2023-07-14 NOTE — PATIENT INSTRUCTIONS
Surgery Instructions:     Your surgery is scheduled on 08/01/23 [facility], second entrance.    The pre-op department will be in contact with you prior to your procedure to review medications and instructions.       Nothing to eat or drink after midnight prior to day of surgery.    The surgery center will contact you the day prior to surgery to advise you of your arrival time for surgery.     Your post op appointment is scheduled on 08/14/23 @ 8:00am.

## 2023-07-14 NOTE — H&P (VIEW-ONLY)
7/14/2023    Chief Complaint:  Chief Complaint   Patient presents with    Left Hand - Pain     Middle finger mucinous cyst       HPI:  Mal Larkin is a 67 y.o. male, who presents to clinic today has a history of mucoid cyst to his left middle finger.  He states that it has ruptured several times but continues to reoccur.  It has caused a deformity of his nail.  He is here today to discuss further treatment options.    PMHX:  Past Medical History:   Diagnosis Date    Adenomatous colon polyp     + dysplasia    Colon polyp     Fatty liver     Melanoma 2011    in situ - upper back    Primary hypertension 6/19/2012       PSHX:  Past Surgical History:   Procedure Laterality Date    APPENDECTOMY      BACK SURGERY      transverse process fracture    COLONOSCOPY  11/11/2013    Dr. Hayes, 3 year recheck    COLONOSCOPY N/A 12/20/2016    Procedure: COLONOSCOPY;  Surgeon: Atilio Hayes MD;  Location: [facility] ENDO;  Service: Endoscopy;  Laterality: N/A;    COLONOSCOPY N/A 1/20/2020    Procedure: COLONOSCOPY;  Surgeon: Atilio Hayes MD;  Location: [facility] ENDO;  Service: Endoscopy;  Laterality: N/A;    FUSION OF LUMBAR SPINE USING POSTERIOR INTERBODY TECHNIQUE N/A 12/11/2018    Procedure: FUSION, SPINE, LUMBAR, PLIF L1-2, L2-3, L3-4, L4-5, L5-S1;  Surgeon: Vik Cox MD;  Location: [facility] OR;  Service: Orthopedics;  Laterality: N/A;    GASTRIC SLEEVE      HAND SURGERY      right dupuytrens release on 8/5/13    LUMBAR LAMINECTOMY  2011    LUMBAR LAMINECTOMY N/A 12/11/2018    Procedure: LAMINECTOMY, SPINE, LUMBAR L1, L2, L3 with  L4-L5 Re-exploration;  Surgeon: Vik Cox MD;  Location: [facility] OR;  Service: Orthopedics;  Laterality: N/A;    melanoma removal  2011    SPINE SURGERY  12/11/2018    Dr Cox L1 - S1. lamanectomy/fusion    WISDOM TOOTH EXTRACTION         FMHX:  Family History   Problem Relation Age of Onset    Skin cancer Mother     Ovarian cancer Mother     Heart disease Mother     Alzheimer's disease  Mother     Prostate cancer Father     Colon cancer Father     Cancer Father         glands    Melanoma Father     Alzheimer's disease Father     Melanoma Sister     Cancer Sister         cervix, abd     Melanoma Sister     Skin cancer Sister     Vaginal cancer Sister     Asthma Daughter     Fibromyalgia Daughter     Heart disease Maternal Uncle     Alcohol abuse Maternal Uncle     Cancer Paternal Aunt     Cerebral aneurysm Maternal Grandmother     Early death Maternal Grandmother         fall hit head    Heart disease Maternal Grandfather     Cancer Paternal Grandmother     Pancreatic cancer Paternal Grandmother     Macular degeneration Neg Hx     Retinal detachment Neg Hx     Glaucoma Neg Hx     Psoriasis Neg Hx     Lupus Neg Hx     Eczema Neg Hx        SOCHX:  Social History     Tobacco Use    Smoking status: Former     Packs/day: 1.00     Years: 30.00     Pack years: 30.00     Types: Cigarettes     Quit date: 2014     Years since quittin.1    Smokeless tobacco: Former     Types: Chew     Quit date: 1979   Substance Use Topics    Alcohol use: Yes     Alcohol/week: 10.0 standard drinks     Types: 12 Standard drinks or equivalent per week     Comment: Drinks beer only and mostly during football season       ALLERGIES:  Patient has no known allergies.    CURRENT MEDICATIONS:  Current Outpatient Medications on File Prior to Visit   Medication Sig Dispense Refill    ascorbic acid, vitamin C, (VITAMIN C) 500 MG tablet Take 500 mg by mouth once daily.      calcium-vitamin D3 (OS-MAGGIE 500 + D3) 500 mg-5 mcg (200 unit) per tablet Take 1 tablet by mouth Daily.      COQ10, LIPOSOMAL UBIQUINOL, ORAL Take 1 capsule by mouth once daily.      cyanocobalamin (VITAMIN B-12) 1000 MCG tablet Take 100 mcg by mouth once daily.      glucosamine-chondroitin 500-400 mg tablet Take 1 tablet by mouth 3 (three) times daily.      KRILL OIL ORAL Take 1 capsule by mouth once daily.      lisinopriL (PRINIVIL,ZESTRIL) 40 MG tablet  Take 1 tablet (40 mg total) by mouth once daily. 90 tablet 3    multivitamin (THERAGRAN) per tablet Take 1 tablet by mouth once daily.      vitamin E 1000 UNIT capsule Take 1,000 Units by mouth once daily.      VITAMIN K2 ORAL Take by mouth.       No current facility-administered medications on file prior to visit.       REVIEW OF SYSTEMS:  Review of Systems   Constitutional:  Negative for diaphoresis and fever.   HENT:  Positive for hearing loss. Negative for ear pain, nosebleeds and tinnitus.    Eyes:  Negative for pain and redness.   Respiratory:  Negative for cough and shortness of breath.    Cardiovascular:  Negative for chest pain and palpitations.   Gastrointestinal:  Negative for blood in stool, constipation, diarrhea, nausea and vomiting.   Genitourinary:  Negative for frequency and hematuria.   Musculoskeletal:  Negative for back pain and myalgias.   Skin:  Negative for itching and rash.   Neurological:  Positive for tingling. Negative for dizziness, seizures, weakness and headaches.   Endo/Heme/Allergies:  Negative for environmental allergies.   Psychiatric/Behavioral:  The patient is not nervous/anxious.      GENERAL PHYSICAL EXAM:   There were no vitals taken for this visit.   GEN: well developed, well nourished, no acute distress   HENT: Normocephalic, atraumatic   EYES: No discharge, conjunctiva normal   NECK: Supple, non-tender   PULM: No wheezing, no respiratory distress   CV: RRR   ABD: Soft, non-tender    ORTHO EXAM:   Examination left middle finger reveals that there is no edema.  He does have a wound overlying the proximal nail fold.  There is a small nail deformity noted.  There is a mucoid cyst underlying the wound at the tip of the finger and proximal nail fold.  This measures 0.5 x 0.5 cm in size.  Palpation in the surrounding area does produce mild tenderness.  There is no erythema or drainage at this time.    RADIOLOGY:   None    ASSESSMENT:   Left middle finger mucoid cyst    PLAN:  1. I  have discussed treatment options with the patient.  I have discussed aspiration and continued observation versus surgical excision.  After discussion of the treatment options the patient has elected to proceed with surgical excision.  Informed consent for the procedure has been performed     2. e will proceed with left middle finger mucoid cyst excision under local anesthesia    3.  The patient will follow up with me 2 weeks postoperatively  Answers submitted by the patient for this visit:  Orthopedics Questionnaire (Submitted on 7/11/2023)  unexpected weight change: No  appetite change : No  sleep disturbance: No  IMMUNOCOMPROMISED: No  dysphoric mood: No  visual disturbance: No  sinus pressure : No  food allergies: No  difficulty urinating: No  numbness: No  joint swelling: No   (Submitted on 7/11/2023)  Chief Complaint: Hand injury  Pain Chronicity: recurrent  History of trauma: No  Onset: more than 1 month ago  Frequency: constantly  Progression since onset: unchanged  Injury mechanism: lifting  injury location: at home  pain- numeric: 8/10  pain location: left hand, right hand  pain quality: aching, sharp, burning  Radiating Pain: No  Aggravating factors: contact  inability to bear weight: No  joint locking: No  limited range of motion: Yes  stiffness: Yes  Treatments tried: nothing  physical therapy: not tried  Improvement on treatment: no relief

## 2023-07-14 NOTE — PROGRESS NOTES
7/14/2023    Chief Complaint:  Chief Complaint   Patient presents with    Left Hand - Pain     Middle finger mucinous cyst       HPI:  Mal Larkin is a 67 y.o. male, who presents to clinic today has a history of mucoid cyst to his left middle finger.  He states that it has ruptured several times but continues to reoccur.  It has caused a deformity of his nail.  He is here today to discuss further treatment options.    PMHX:  Past Medical History:   Diagnosis Date    Adenomatous colon polyp     + dysplasia    Colon polyp     Fatty liver     Melanoma 2011    in situ - upper back    Primary hypertension 6/19/2012       PSHX:  Past Surgical History:   Procedure Laterality Date    APPENDECTOMY      BACK SURGERY      transverse process fracture    COLONOSCOPY  11/11/2013    Dr. Hayes, 3 year recheck    COLONOSCOPY N/A 12/20/2016    Procedure: COLONOSCOPY;  Surgeon: Atilio Hayes MD;  Location: [facility] ENDO;  Service: Endoscopy;  Laterality: N/A;    COLONOSCOPY N/A 1/20/2020    Procedure: COLONOSCOPY;  Surgeon: Atilio Hayes MD;  Location: [facility] ENDO;  Service: Endoscopy;  Laterality: N/A;    FUSION OF LUMBAR SPINE USING POSTERIOR INTERBODY TECHNIQUE N/A 12/11/2018    Procedure: FUSION, SPINE, LUMBAR, PLIF L1-2, L2-3, L3-4, L4-5, L5-S1;  Surgeon: Vik Cox MD;  Location: [facility] OR;  Service: Orthopedics;  Laterality: N/A;    GASTRIC SLEEVE      HAND SURGERY      right dupuytrens release on 8/5/13    LUMBAR LAMINECTOMY  2011    LUMBAR LAMINECTOMY N/A 12/11/2018    Procedure: LAMINECTOMY, SPINE, LUMBAR L1, L2, L3 with  L4-L5 Re-exploration;  Surgeon: Vik Cox MD;  Location: [facility] OR;  Service: Orthopedics;  Laterality: N/A;    melanoma removal  2011    SPINE SURGERY  12/11/2018    Dr Cox L1 - S1. lamanectomy/fusion    WISDOM TOOTH EXTRACTION         FMHX:  Family History   Problem Relation Age of Onset    Skin cancer Mother     Ovarian cancer Mother     Heart disease Mother     Alzheimer's disease  Mother     Prostate cancer Father     Colon cancer Father     Cancer Father         glands    Melanoma Father     Alzheimer's disease Father     Melanoma Sister     Cancer Sister         cervix, abd     Melanoma Sister     Skin cancer Sister     Vaginal cancer Sister     Asthma Daughter     Fibromyalgia Daughter     Heart disease Maternal Uncle     Alcohol abuse Maternal Uncle     Cancer Paternal Aunt     Cerebral aneurysm Maternal Grandmother     Early death Maternal Grandmother         fall hit head    Heart disease Maternal Grandfather     Cancer Paternal Grandmother     Pancreatic cancer Paternal Grandmother     Macular degeneration Neg Hx     Retinal detachment Neg Hx     Glaucoma Neg Hx     Psoriasis Neg Hx     Lupus Neg Hx     Eczema Neg Hx        SOCHX:  Social History     Tobacco Use    Smoking status: Former     Packs/day: 1.00     Years: 30.00     Pack years: 30.00     Types: Cigarettes     Quit date: 2014     Years since quittin.1    Smokeless tobacco: Former     Types: Chew     Quit date: 1979   Substance Use Topics    Alcohol use: Yes     Alcohol/week: 10.0 standard drinks     Types: 12 Standard drinks or equivalent per week     Comment: Drinks beer only and mostly during football season       ALLERGIES:  Patient has no known allergies.    CURRENT MEDICATIONS:  Current Outpatient Medications on File Prior to Visit   Medication Sig Dispense Refill    ascorbic acid, vitamin C, (VITAMIN C) 500 MG tablet Take 500 mg by mouth once daily.      calcium-vitamin D3 (OS-MAGGIE 500 + D3) 500 mg-5 mcg (200 unit) per tablet Take 1 tablet by mouth Daily.      COQ10, LIPOSOMAL UBIQUINOL, ORAL Take 1 capsule by mouth once daily.      cyanocobalamin (VITAMIN B-12) 1000 MCG tablet Take 100 mcg by mouth once daily.      glucosamine-chondroitin 500-400 mg tablet Take 1 tablet by mouth 3 (three) times daily.      KRILL OIL ORAL Take 1 capsule by mouth once daily.      lisinopriL (PRINIVIL,ZESTRIL) 40 MG tablet  Take 1 tablet (40 mg total) by mouth once daily. 90 tablet 3    multivitamin (THERAGRAN) per tablet Take 1 tablet by mouth once daily.      vitamin E 1000 UNIT capsule Take 1,000 Units by mouth once daily.      VITAMIN K2 ORAL Take by mouth.       No current facility-administered medications on file prior to visit.       REVIEW OF SYSTEMS:  Review of Systems   Constitutional:  Negative for diaphoresis and fever.   HENT:  Positive for hearing loss. Negative for ear pain, nosebleeds and tinnitus.    Eyes:  Negative for pain and redness.   Respiratory:  Negative for cough and shortness of breath.    Cardiovascular:  Negative for chest pain and palpitations.   Gastrointestinal:  Negative for blood in stool, constipation, diarrhea, nausea and vomiting.   Genitourinary:  Negative for frequency and hematuria.   Musculoskeletal:  Negative for back pain and myalgias.   Skin:  Negative for itching and rash.   Neurological:  Positive for tingling. Negative for dizziness, seizures, weakness and headaches.   Endo/Heme/Allergies:  Negative for environmental allergies.   Psychiatric/Behavioral:  The patient is not nervous/anxious.      GENERAL PHYSICAL EXAM:   There were no vitals taken for this visit.   GEN: well developed, well nourished, no acute distress   HENT: Normocephalic, atraumatic   EYES: No discharge, conjunctiva normal   NECK: Supple, non-tender   PULM: No wheezing, no respiratory distress   CV: RRR   ABD: Soft, non-tender    ORTHO EXAM:   Examination left middle finger reveals that there is no edema.  He does have a wound overlying the proximal nail fold.  There is a small nail deformity noted.  There is a mucoid cyst underlying the wound at the tip of the finger and proximal nail fold.  This measures 0.5 x 0.5 cm in size.  Palpation in the surrounding area does produce mild tenderness.  There is no erythema or drainage at this time.    RADIOLOGY:   None    ASSESSMENT:   Left middle finger mucoid cyst    PLAN:  1. I  have discussed treatment options with the patient.  I have discussed aspiration and continued observation versus surgical excision.  After discussion of the treatment options the patient has elected to proceed with surgical excision.  Informed consent for the procedure has been performed     2. e will proceed with left middle finger mucoid cyst excision under local anesthesia    3.  The patient will follow up with me 2 weeks postoperatively  Answers submitted by the patient for this visit:  Orthopedics Questionnaire (Submitted on 7/11/2023)  unexpected weight change: No  appetite change : No  sleep disturbance: No  IMMUNOCOMPROMISED: No  dysphoric mood: No  visual disturbance: No  sinus pressure : No  food allergies: No  difficulty urinating: No  numbness: No  joint swelling: No   (Submitted on 7/11/2023)  Chief Complaint: Hand injury  Pain Chronicity: recurrent  History of trauma: No  Onset: more than 1 month ago  Frequency: constantly  Progression since onset: unchanged  Injury mechanism: lifting  injury location: at home  pain- numeric: 8/10  pain location: left hand, right hand  pain quality: aching, sharp, burning  Radiating Pain: No  Aggravating factors: contact  inability to bear weight: No  joint locking: No  limited range of motion: Yes  stiffness: Yes  Treatments tried: nothing  physical therapy: not tried  Improvement on treatment: no relief

## 2023-07-17 DIAGNOSIS — M67.442 DIGITAL MUCINOUS CYST OF FINGER OF LEFT HAND: Primary | ICD-10-CM

## 2023-08-01 ENCOUNTER — HOSPITAL ENCOUNTER (OUTPATIENT)
Facility: HOSPITAL | Age: 68
Discharge: HOME | End: 2023-08-01
Attending: ORTHOPAEDIC SURGERY | Admitting: ORTHOPAEDIC SURGERY
Payer: MEDICARE

## 2023-08-01 DIAGNOSIS — M67.442 DIGITAL MUCINOUS CYST OF FINGER OF LEFT HAND: ICD-10-CM

## 2023-08-01 PROCEDURE — 25000003 PHARM REV CODE 250: Mod: PO | Performed by: ORTHOPAEDIC SURGERY

## 2023-08-01 PROCEDURE — 88304 PR  SURG PATH,LEVEL III: ICD-10-PCS | Mod: 26,HCNC,, | Performed by: PATHOLOGY

## 2023-08-01 PROCEDURE — 71000015 HC POSTOP RECOV 1ST HR: Mod: HCNC,PO | Performed by: ORTHOPAEDIC SURGERY

## 2023-08-01 PROCEDURE — 88304 TISSUE EXAM BY PATHOLOGIST: CPT | Mod: HCNC,PO | Performed by: PATHOLOGY

## 2023-08-01 PROCEDURE — 63600175 PHARM REV CODE 636 W HCPCS: Mod: PO | Performed by: PHYSICIAN ASSISTANT

## 2023-08-01 PROCEDURE — 26160 REMOVE TENDON SHEATH LESION: CPT | Mod: F2,HCNC,, | Performed by: ORTHOPAEDIC SURGERY

## 2023-08-01 PROCEDURE — 88304 TISSUE EXAM BY PATHOLOGIST: CPT | Mod: 26,HCNC,, | Performed by: PATHOLOGY

## 2023-08-01 PROCEDURE — 36000706: Mod: HCNC,PO | Performed by: ORTHOPAEDIC SURGERY

## 2023-08-01 PROCEDURE — 63600175 PHARM REV CODE 636 W HCPCS: Mod: PO | Performed by: ORTHOPAEDIC SURGERY

## 2023-08-01 PROCEDURE — 26160 PR EXCIS TENDON SHEATH LESION, HAND/FINGER: ICD-10-PCS | Mod: F2,HCNC,, | Performed by: ORTHOPAEDIC SURGERY

## 2023-08-01 PROCEDURE — 36000707: Mod: HCNC,PO | Performed by: ORTHOPAEDIC SURGERY

## 2023-08-01 RX ORDER — LIDOCAINE HYDROCHLORIDE 10 MG/ML
INJECTION INFILTRATION; PERINEURAL
Status: DISCONTINUED | OUTPATIENT
Start: 2023-08-01 | End: 2023-08-01 | Stop reason: HOSPADM

## 2023-08-01 RX ORDER — CEFAZOLIN SODIUM 2 G/50ML
2 SOLUTION INTRAVENOUS
Status: COMPLETED | OUTPATIENT
Start: 2023-08-01 | End: 2023-08-01

## 2023-08-01 RX ORDER — BUPIVACAINE HYDROCHLORIDE 5 MG/ML
INJECTION, SOLUTION EPIDURAL; INTRACAUDAL
Status: DISCONTINUED | OUTPATIENT
Start: 2023-08-01 | End: 2023-08-01 | Stop reason: HOSPADM

## 2023-08-01 NOTE — DISCHARGE SUMMARY
[facility] - Surgery  Discharge Note  Short Stay    Procedure(s) (LRB):  Left middle finger mucoid cyst excision (Left)      OUTCOME: Patient tolerated treatment/procedure well without complication and is now ready for discharge.    DISPOSITION: Home or Self Care    FINAL DIAGNOSIS:  Digital mucinous cyst of finger of left hand    FOLLOWUP: In clinic    DISCHARGE INSTRUCTIONS:    Discharge Procedure Orders   Diet general     Activity as tolerated     Keep surgical extremity elevated     Lifting restrictions   Order Comments: Please do not lift or push off with the left arm or hand     Leave dressing on - Keep it clean, dry, and intact until clinic visit     Call MD for:  temperature >100.4     Call MD for:  persistent nausea and vomiting     Call MD for:  severe uncontrolled pain     Call MD for:  difficulty breathing, headache or visual disturbances     Call MD for:  redness, tenderness, or signs of infection (pain, swelling, redness, odor or green/yellow discharge around incision site)     Call MD for:  hives     Call MD for:  persistent dizziness or light-headedness     Call MD for:  extreme fatigue        TIME SPENT ON DISCHARGE: 15 minutes

## 2023-08-01 NOTE — PATIENT INSTRUCTIONS
Procedure:  Left middle finger mucoid cyst excision    1. Please keep the dressing clean, dry, and in place. Do not take it off and do not get it wet.    2. Please keep the left arm and hand elevated for the 1st 24-48 hours to prevent swelling    3. Flexion and extension of the exposed fingers is encouraged, but do not attempt to push off or lift more than 1-2 pounds with left arm or hand    4. Please limit weightbearing to the left hand to 1-2 pounds. Light activity such as brushing your teeth, using a fork, or lifting a small drink is allowed starting today.    5. Over-the-counter ibuprofen or Tylenol can be taken for pain.    6. If there are any questions or concerns please call Dr. Alaniz's office at [phone]    7.  Follow up with Dr. Alaniz in 2 weeks

## 2023-08-01 NOTE — OP NOTE
Mal Larkin  1955    DATE OF SURGERY: 8/1/2023     PRE-OPERATIVE DIAGNOSIS:  Left middle finger mucoid cyst    POST-OPERATIVE DIAGNOSIS:  Left middle finger mucoid cyst     ANESTHESIA TYPE:  Local    BLOOD LOSS:  Less than 10 cc    TOURNIQUET TIME:  Approximately 10 minutes    SURGEON: Dr Alaniz    ASSISTANT:  Isabel Oconnell    PROCEDURE:  Left middle finger mucoid cyst/ganglion excision    IMPLANTS:  None     SPECIMENS:  Left middle finger cyst for pathology    INDICATION:     Mr. Larkin has a history of an enlarging cyst overlying the tip of his left middle finger.  It had ruptured several times but got significantly large and was very painful.  Due to the combination of pain and recurrence I discussed the possibility of removal of the cyst.  Informed consent was then obtained.    PROCEDURE IN DETAIL:     Mr. Larkin was transported to the operating room and was placed supine on the operating room table. All appropriate points were padded. The left arm and hand was prepped and draped in the normal sterile fashion. Time out was called. The correct patient, correct operative site, correct procedure, antibiotic administration which consisted of 2 g of Ancef, and allergies to medications which are to Patient has no known allergies.  were reviewed. Time in was then called.     Attention was turned to the left middle finger.  A 1 cm incision was made directly over the cyst.  The incision was carried through the skin.  Subcutaneous tissues were dissected sharply.  There was noted to be a cystic structure within the subcutaneous tissues.  This was dissected free from the region of the germinal matrix.  The cyst was tracked all the way back to the distal interphalangeal joint.  The entire stalk was then removed.  The area was debrided with a curette.  The wound was then copiously irrigated.  There was no remaining evidence of cyst noted.  The wound was then closed superficially with 5 0 chromic suture.  The wound  was then dressed with Xeroform, Miko wrap and Coban dressing.    The patient was awakened from anesthesia and was transported to the recovery room in stable condition. All lap, needle, sponge, and equipment counts were correct at the end of the case.    POST-OPERATIVE PLAN:     Patient will keep the dressing in place for 2 weeks which time he will follow up with me.  I will begin range of motion at that time.

## 2023-08-02 VITALS
RESPIRATION RATE: 15 BRPM | HEART RATE: 61 BPM | BODY MASS INDEX: 27.74 KG/M2 | DIASTOLIC BLOOD PRESSURE: 86 MMHG | TEMPERATURE: 98 F | WEIGHT: 183 LBS | OXYGEN SATURATION: 97 % | HEIGHT: 68 IN | SYSTOLIC BLOOD PRESSURE: 178 MMHG

## 2023-08-03 NOTE — PROGRESS NOTES
Last 5 Patient Entered Readings                                      Current 30 Day Average: 130/74     Recent Readings 6/23/2018 6/23/2018 6/23/2018 6/14/2018 6/14/2018    SBP (mmHg) 131 128 139 138 133    DBP (mmHg) 77 78 83 76 79    Pulse 69 69 70 67 68        Mr. Larkin's BP average is approaching goal. He is not confident in the BP readings he gets from his digital monitor. He also says his BP readings are lower at one MD office vs the other. Explained that his BP will fluctuate from cuff to cuff and from minute to minute. He will have Dr. Marino's nurse check his BP manually and then compare it to the HDMP monitor to ensure BP readings are similar.     Will continue to monitor regularly. Will follow up in 3-4 weeks, sooner if BP begins to trend upward or downward.    Patient has my contact information and knows to call with any concerns or clinical changes.     Current HTN regimen:  Hypertension Medications             amLODIPine (NORVASC) 5 MG tablet Take 1 tablet (5 mg total) by mouth once daily.    furosemide (LASIX) 20 MG tablet TAKE ONE TABLET BY MOUTH ONCE DAILY    lisinopril (PRINIVIL,ZESTRIL) 40 MG tablet Take 1 tablet (40 mg total) by mouth once daily.               Detail Level: Zone

## 2023-08-04 LAB
FINAL PATHOLOGIC DIAGNOSIS: NORMAL
GROSS: NORMAL
Lab: NORMAL

## 2023-08-14 ENCOUNTER — OFFICE VISIT (OUTPATIENT)
Dept: ORTHOPEDICS | Facility: CLINIC | Age: 68
End: 2023-08-14
Payer: MEDICARE

## 2023-08-14 DIAGNOSIS — M67.442 DIGITAL MUCINOUS CYST OF FINGER OF LEFT HAND: Primary | ICD-10-CM

## 2023-08-14 PROCEDURE — 4010F PR ACE/ARB THEARPY RXD/TAKEN: ICD-10-PCS | Mod: HCNC,CPTII,S$GLB, | Performed by: ORTHOPAEDIC SURGERY

## 2023-08-14 PROCEDURE — 1101F PT FALLS ASSESS-DOCD LE1/YR: CPT | Mod: HCNC,CPTII,S$GLB, | Performed by: ORTHOPAEDIC SURGERY

## 2023-08-14 PROCEDURE — 1126F AMNT PAIN NOTED NONE PRSNT: CPT | Mod: HCNC,CPTII,S$GLB, | Performed by: ORTHOPAEDIC SURGERY

## 2023-08-14 PROCEDURE — 99024 PR POST-OP FOLLOW-UP VISIT: ICD-10-PCS | Mod: HCNC,S$GLB,, | Performed by: ORTHOPAEDIC SURGERY

## 2023-08-14 PROCEDURE — 1159F MED LIST DOCD IN RCRD: CPT | Mod: HCNC,CPTII,S$GLB, | Performed by: ORTHOPAEDIC SURGERY

## 2023-08-14 PROCEDURE — 99999 PR PBB SHADOW E&M-EST. PATIENT-LVL II: CPT | Mod: PBBFAC,HCNC,, | Performed by: ORTHOPAEDIC SURGERY

## 2023-08-14 PROCEDURE — 99024 POSTOP FOLLOW-UP VISIT: CPT | Mod: HCNC,S$GLB,, | Performed by: ORTHOPAEDIC SURGERY

## 2023-08-14 PROCEDURE — 1157F ADVNC CARE PLAN IN RCRD: CPT | Mod: HCNC,CPTII,S$GLB, | Performed by: ORTHOPAEDIC SURGERY

## 2023-08-14 PROCEDURE — 1126F PR PAIN SEVERITY QUANTIFIED, NO PAIN PRESENT: ICD-10-PCS | Mod: HCNC,CPTII,S$GLB, | Performed by: ORTHOPAEDIC SURGERY

## 2023-08-14 PROCEDURE — 3288F FALL RISK ASSESSMENT DOCD: CPT | Mod: HCNC,CPTII,S$GLB, | Performed by: ORTHOPAEDIC SURGERY

## 2023-08-14 PROCEDURE — 1159F PR MEDICATION LIST DOCUMENTED IN MEDICAL RECORD: ICD-10-PCS | Mod: HCNC,CPTII,S$GLB, | Performed by: ORTHOPAEDIC SURGERY

## 2023-08-14 PROCEDURE — 1101F PR PT FALLS ASSESS DOC 0-1 FALLS W/OUT INJ PAST YR: ICD-10-PCS | Mod: HCNC,CPTII,S$GLB, | Performed by: ORTHOPAEDIC SURGERY

## 2023-08-14 PROCEDURE — 4010F ACE/ARB THERAPY RXD/TAKEN: CPT | Mod: HCNC,CPTII,S$GLB, | Performed by: ORTHOPAEDIC SURGERY

## 2023-08-14 PROCEDURE — 3288F PR FALLS RISK ASSESSMENT DOCUMENTED: ICD-10-PCS | Mod: HCNC,CPTII,S$GLB, | Performed by: ORTHOPAEDIC SURGERY

## 2023-08-14 PROCEDURE — 99999 PR PBB SHADOW E&M-EST. PATIENT-LVL II: ICD-10-PCS | Mod: PBBFAC,HCNC,, | Performed by: ORTHOPAEDIC SURGERY

## 2023-08-14 PROCEDURE — 1157F PR ADVANCE CARE PLAN OR EQUIV PRESENT IN MEDICAL RECORD: ICD-10-PCS | Mod: HCNC,CPTII,S$GLB, | Performed by: ORTHOPAEDIC SURGERY

## 2023-08-14 NOTE — PROGRESS NOTES
Mr Larkin returns to clinic today.  Has a history of left middle finger mucoid cyst.  Underwent excision approximately 2 weeks ago and is doing well     Physical exam: Examination left middle finger reveals that the wound is healing well.  The sutures are no longer present.  There is no significant edema or erythema.  There is no drainage noted     Assessment:  Left middle finger mucoid cyst excision     Plan:    1. He is allowed to begin leaving it open to the air    2.  Will continue limited weight-bearing    3. Will follow up in 2-3 weeks for repeat evaluation which point will consider returning activity as tolerated

## 2023-09-06 ENCOUNTER — OFFICE VISIT (OUTPATIENT)
Dept: ORTHOPEDICS | Facility: CLINIC | Age: 68
End: 2023-09-06
Payer: MEDICARE

## 2023-09-06 ENCOUNTER — PATIENT MESSAGE (OUTPATIENT)
Dept: ADMINISTRATIVE | Facility: OTHER | Age: 68
End: 2023-09-06
Payer: MEDICARE

## 2023-09-06 DIAGNOSIS — M67.442 DIGITAL MUCINOUS CYST OF FINGER OF LEFT HAND: Primary | ICD-10-CM

## 2023-09-06 PROCEDURE — 1126F AMNT PAIN NOTED NONE PRSNT: CPT | Mod: HCNC,CPTII,S$GLB, | Performed by: ORTHOPAEDIC SURGERY

## 2023-09-06 PROCEDURE — 99999 PR PBB SHADOW E&M-EST. PATIENT-LVL II: CPT | Mod: PBBFAC,HCNC,, | Performed by: ORTHOPAEDIC SURGERY

## 2023-09-06 PROCEDURE — 1101F PT FALLS ASSESS-DOCD LE1/YR: CPT | Mod: HCNC,CPTII,S$GLB, | Performed by: ORTHOPAEDIC SURGERY

## 2023-09-06 PROCEDURE — 1101F PR PT FALLS ASSESS DOC 0-1 FALLS W/OUT INJ PAST YR: ICD-10-PCS | Mod: HCNC,CPTII,S$GLB, | Performed by: ORTHOPAEDIC SURGERY

## 2023-09-06 PROCEDURE — 3288F PR FALLS RISK ASSESSMENT DOCUMENTED: ICD-10-PCS | Mod: HCNC,CPTII,S$GLB, | Performed by: ORTHOPAEDIC SURGERY

## 2023-09-06 PROCEDURE — 1126F PR PAIN SEVERITY QUANTIFIED, NO PAIN PRESENT: ICD-10-PCS | Mod: HCNC,CPTII,S$GLB, | Performed by: ORTHOPAEDIC SURGERY

## 2023-09-06 PROCEDURE — 1157F ADVNC CARE PLAN IN RCRD: CPT | Mod: HCNC,CPTII,S$GLB, | Performed by: ORTHOPAEDIC SURGERY

## 2023-09-06 PROCEDURE — 1157F PR ADVANCE CARE PLAN OR EQUIV PRESENT IN MEDICAL RECORD: ICD-10-PCS | Mod: HCNC,CPTII,S$GLB, | Performed by: ORTHOPAEDIC SURGERY

## 2023-09-06 PROCEDURE — 1159F PR MEDICATION LIST DOCUMENTED IN MEDICAL RECORD: ICD-10-PCS | Mod: HCNC,CPTII,S$GLB, | Performed by: ORTHOPAEDIC SURGERY

## 2023-09-06 PROCEDURE — 4010F ACE/ARB THERAPY RXD/TAKEN: CPT | Mod: HCNC,CPTII,S$GLB, | Performed by: ORTHOPAEDIC SURGERY

## 2023-09-06 PROCEDURE — 1159F MED LIST DOCD IN RCRD: CPT | Mod: HCNC,CPTII,S$GLB, | Performed by: ORTHOPAEDIC SURGERY

## 2023-09-06 PROCEDURE — 99024 PR POST-OP FOLLOW-UP VISIT: ICD-10-PCS | Mod: HCNC,S$GLB,, | Performed by: ORTHOPAEDIC SURGERY

## 2023-09-06 PROCEDURE — 99999 PR PBB SHADOW E&M-EST. PATIENT-LVL II: ICD-10-PCS | Mod: PBBFAC,HCNC,, | Performed by: ORTHOPAEDIC SURGERY

## 2023-09-06 PROCEDURE — 3288F FALL RISK ASSESSMENT DOCD: CPT | Mod: HCNC,CPTII,S$GLB, | Performed by: ORTHOPAEDIC SURGERY

## 2023-09-06 PROCEDURE — 99024 POSTOP FOLLOW-UP VISIT: CPT | Mod: HCNC,S$GLB,, | Performed by: ORTHOPAEDIC SURGERY

## 2023-09-06 PROCEDURE — 4010F PR ACE/ARB THEARPY RXD/TAKEN: ICD-10-PCS | Mod: HCNC,CPTII,S$GLB, | Performed by: ORTHOPAEDIC SURGERY

## 2023-09-06 NOTE — PROGRESS NOTES
Mr Larkin returns to clinic today.  Has a history of left finger mucoid cyst excision.  He states he has been doing well but that he did have some in that area and a little bit of clear fluid leak that.  Since that time and has improved in appearance.  The has no other current complaints     Physical exam: Examination left middle finger reveals that the wound is now healed.  There is very mild erythema overlying the area of the distal interphalangeal joint.  There is no active cyst visible.  He is neurovascularly intact     Assessment:  Left middle finger mucoid cyst     Plan:    1. At this point his finger does look pretty good I do not see an active cyst and therefore we will continue to monitor    2.  I have discussed the risks of recurrence of the cyst in it he has a significant recurrence we would have to discuss possibly revising the removal.      3. He will follow up with me as needed

## 2023-10-20 ENCOUNTER — OFFICE VISIT (OUTPATIENT)
Dept: ORTHOPEDICS | Facility: CLINIC | Age: 68
End: 2023-10-20
Payer: MEDICARE

## 2023-10-20 DIAGNOSIS — M67.40 MUCOID CYST OF JOINT: Primary | ICD-10-CM

## 2023-10-20 PROCEDURE — 99024 PR POST-OP FOLLOW-UP VISIT: ICD-10-PCS | Mod: HCNC,S$GLB,, | Performed by: ORTHOPAEDIC SURGERY

## 2023-10-20 PROCEDURE — 99024 POSTOP FOLLOW-UP VISIT: CPT | Mod: HCNC,S$GLB,, | Performed by: ORTHOPAEDIC SURGERY

## 2023-10-20 PROCEDURE — 1126F PR PAIN SEVERITY QUANTIFIED, NO PAIN PRESENT: ICD-10-PCS | Mod: HCNC,CPTII,S$GLB, | Performed by: ORTHOPAEDIC SURGERY

## 2023-10-20 PROCEDURE — 4010F ACE/ARB THERAPY RXD/TAKEN: CPT | Mod: HCNC,CPTII,S$GLB, | Performed by: ORTHOPAEDIC SURGERY

## 2023-10-20 PROCEDURE — 3288F FALL RISK ASSESSMENT DOCD: CPT | Mod: HCNC,CPTII,S$GLB, | Performed by: ORTHOPAEDIC SURGERY

## 2023-10-20 PROCEDURE — 1101F PT FALLS ASSESS-DOCD LE1/YR: CPT | Mod: HCNC,CPTII,S$GLB, | Performed by: ORTHOPAEDIC SURGERY

## 2023-10-20 PROCEDURE — 3288F PR FALLS RISK ASSESSMENT DOCUMENTED: ICD-10-PCS | Mod: HCNC,CPTII,S$GLB, | Performed by: ORTHOPAEDIC SURGERY

## 2023-10-20 PROCEDURE — 4010F PR ACE/ARB THEARPY RXD/TAKEN: ICD-10-PCS | Mod: HCNC,CPTII,S$GLB, | Performed by: ORTHOPAEDIC SURGERY

## 2023-10-20 PROCEDURE — 1101F PR PT FALLS ASSESS DOC 0-1 FALLS W/OUT INJ PAST YR: ICD-10-PCS | Mod: HCNC,CPTII,S$GLB, | Performed by: ORTHOPAEDIC SURGERY

## 2023-10-20 PROCEDURE — 1126F AMNT PAIN NOTED NONE PRSNT: CPT | Mod: HCNC,CPTII,S$GLB, | Performed by: ORTHOPAEDIC SURGERY

## 2023-10-20 PROCEDURE — 99999 PR PBB SHADOW E&M-EST. PATIENT-LVL I: CPT | Mod: PBBFAC,HCNC,, | Performed by: ORTHOPAEDIC SURGERY

## 2023-10-20 PROCEDURE — 99999 PR PBB SHADOW E&M-EST. PATIENT-LVL I: ICD-10-PCS | Mod: PBBFAC,HCNC,, | Performed by: ORTHOPAEDIC SURGERY

## 2023-10-20 PROCEDURE — 1157F PR ADVANCE CARE PLAN OR EQUIV PRESENT IN MEDICAL RECORD: ICD-10-PCS | Mod: HCNC,CPTII,S$GLB, | Performed by: ORTHOPAEDIC SURGERY

## 2023-10-20 PROCEDURE — 1157F ADVNC CARE PLAN IN RCRD: CPT | Mod: HCNC,CPTII,S$GLB, | Performed by: ORTHOPAEDIC SURGERY

## 2023-10-30 NOTE — PROGRESS NOTES
Mr Larkin returns to clinic today.  Has a history of a left middle finger mucoid cyst.  He had it excised once in the past but it has returned.  He is here today to discuss further treatment     Physical exam: Examination left hand middle finger reveals that there is a small cystic structure overlying the dorsum of the middle finger.  This is at the area of the proximal nail fold.  There is no surrounding edema or erythema.  Palpation does not produce a significant amount of tenderness.  He is neurovascularly intact     Assessment: Left middle finger mucoid cyst     Plan:    1. I have discussed treatment with the patient.  I have discussed the possibility of repeating an excision.  This time the patient states that he would like to continue to monitor it    2.  Follow up with me in 6 weeks for repeat evaluation at which point will discuss any further treatment

## 2023-12-04 ENCOUNTER — OFFICE VISIT (OUTPATIENT)
Dept: ORTHOPEDICS | Facility: CLINIC | Age: 68
End: 2023-12-04
Payer: MEDICARE

## 2023-12-04 VITALS — WEIGHT: 183 LBS | HEIGHT: 68 IN | BODY MASS INDEX: 27.74 KG/M2

## 2023-12-04 DIAGNOSIS — M67.442 DIGITAL MUCINOUS CYST OF FINGER OF LEFT HAND: Primary | ICD-10-CM

## 2023-12-04 PROCEDURE — 1101F PR PT FALLS ASSESS DOC 0-1 FALLS W/OUT INJ PAST YR: ICD-10-PCS | Mod: HCNC,CPTII,S$GLB, | Performed by: ORTHOPAEDIC SURGERY

## 2023-12-04 PROCEDURE — 3008F PR BODY MASS INDEX (BMI) DOCUMENTED: ICD-10-PCS | Mod: HCNC,CPTII,S$GLB, | Performed by: ORTHOPAEDIC SURGERY

## 2023-12-04 PROCEDURE — 3288F FALL RISK ASSESSMENT DOCD: CPT | Mod: HCNC,CPTII,S$GLB, | Performed by: ORTHOPAEDIC SURGERY

## 2023-12-04 PROCEDURE — 1159F MED LIST DOCD IN RCRD: CPT | Mod: HCNC,CPTII,S$GLB, | Performed by: ORTHOPAEDIC SURGERY

## 2023-12-04 PROCEDURE — 4010F PR ACE/ARB THEARPY RXD/TAKEN: ICD-10-PCS | Mod: HCNC,CPTII,S$GLB, | Performed by: ORTHOPAEDIC SURGERY

## 2023-12-04 PROCEDURE — 3008F BODY MASS INDEX DOCD: CPT | Mod: HCNC,CPTII,S$GLB, | Performed by: ORTHOPAEDIC SURGERY

## 2023-12-04 PROCEDURE — 1157F ADVNC CARE PLAN IN RCRD: CPT | Mod: HCNC,CPTII,S$GLB, | Performed by: ORTHOPAEDIC SURGERY

## 2023-12-04 PROCEDURE — 99213 OFFICE O/P EST LOW 20 MIN: CPT | Mod: HCNC,S$GLB,, | Performed by: ORTHOPAEDIC SURGERY

## 2023-12-04 PROCEDURE — 99213 PR OFFICE/OUTPT VISIT, EST, LEVL III, 20-29 MIN: ICD-10-PCS | Mod: HCNC,S$GLB,, | Performed by: ORTHOPAEDIC SURGERY

## 2023-12-04 PROCEDURE — 4010F ACE/ARB THERAPY RXD/TAKEN: CPT | Mod: HCNC,CPTII,S$GLB, | Performed by: ORTHOPAEDIC SURGERY

## 2023-12-04 PROCEDURE — 1159F PR MEDICATION LIST DOCUMENTED IN MEDICAL RECORD: ICD-10-PCS | Mod: HCNC,CPTII,S$GLB, | Performed by: ORTHOPAEDIC SURGERY

## 2023-12-04 PROCEDURE — 99999 PR PBB SHADOW E&M-EST. PATIENT-LVL III: CPT | Mod: PBBFAC,HCNC,, | Performed by: ORTHOPAEDIC SURGERY

## 2023-12-04 PROCEDURE — 1157F PR ADVANCE CARE PLAN OR EQUIV PRESENT IN MEDICAL RECORD: ICD-10-PCS | Mod: HCNC,CPTII,S$GLB, | Performed by: ORTHOPAEDIC SURGERY

## 2023-12-04 PROCEDURE — 1101F PT FALLS ASSESS-DOCD LE1/YR: CPT | Mod: HCNC,CPTII,S$GLB, | Performed by: ORTHOPAEDIC SURGERY

## 2023-12-04 PROCEDURE — 1126F AMNT PAIN NOTED NONE PRSNT: CPT | Mod: HCNC,CPTII,S$GLB, | Performed by: ORTHOPAEDIC SURGERY

## 2023-12-04 PROCEDURE — 3288F PR FALLS RISK ASSESSMENT DOCUMENTED: ICD-10-PCS | Mod: HCNC,CPTII,S$GLB, | Performed by: ORTHOPAEDIC SURGERY

## 2023-12-04 PROCEDURE — 1126F PR PAIN SEVERITY QUANTIFIED, NO PAIN PRESENT: ICD-10-PCS | Mod: HCNC,CPTII,S$GLB, | Performed by: ORTHOPAEDIC SURGERY

## 2023-12-04 PROCEDURE — 99999 PR PBB SHADOW E&M-EST. PATIENT-LVL III: ICD-10-PCS | Mod: PBBFAC,HCNC,, | Performed by: ORTHOPAEDIC SURGERY

## 2023-12-04 NOTE — H&P (VIEW-ONLY)
12/4/2023    Chief Complaint:  Chief Complaint   Patient presents with    Left Hand - Pain     Left middle finger cyst        HPI:  Mal Larkin is a 68 y.o. male, who presents to clinic today he has a history of left middle finger mucoid cyst.  He had it excised once in the past.  It has returned.  We have monitored but it has not gone away.  He is here today to discuss further treatment    PMHX:  Past Medical History:   Diagnosis Date    Adenomatous colon polyp     + dysplasia    Colon polyp     Fatty liver     Melanoma 2011    in situ - upper back    Primary hypertension 6/19/2012       PSHX:  Past Surgical History:   Procedure Laterality Date    APPENDECTOMY      BACK SURGERY      transverse process fracture    COLONOSCOPY  11/11/2013    Dr. Hayes, 3 year recheck    COLONOSCOPY N/A 12/20/2016    Procedure: COLONOSCOPY;  Surgeon: Atilio Hayes MD;  Location: [facility] ENDO;  Service: Endoscopy;  Laterality: N/A;    COLONOSCOPY N/A 1/20/2020    Procedure: COLONOSCOPY;  Surgeon: Atilio Hayes MD;  Location: [facility];  Service: Endoscopy;  Laterality: N/A;    FINGER MASS EXCISION Left 8/1/2023    Procedure: Left middle finger mucoid cyst excision;  Surgeon: Atilio Alaniz MD;  Location: [facility] OR;  Service: Orthopedics;  Laterality: Left;    FUSION OF LUMBAR SPINE USING POSTERIOR INTERBODY TECHNIQUE N/A 12/11/2018    Procedure: FUSION, SPINE, LUMBAR, PLIF L1-2, L2-3, L3-4, L4-5, L5-S1;  Surgeon: Vik Cox MD;  Location: [facility] OR;  Service: Orthopedics;  Laterality: N/A;    GASTRIC SLEEVE      HAND SURGERY      right dupuytrens release on 8/5/13    LUMBAR LAMINECTOMY  2011    LUMBAR LAMINECTOMY N/A 12/11/2018    Procedure: LAMINECTOMY, SPINE, LUMBAR L1, L2, L3 with  L4-L5 Re-exploration;  Surgeon: Vik Cox MD;  Location: [facility] OR;  Service: Orthopedics;  Laterality: N/A;    melanoma removal  2011    SPINE SURGERY  12/11/2018    Dr Cox L1 - S1. lamanectomy/fusion    WISDOM TOOTH  EXTRACTION         FMHX:  Family History   Problem Relation Age of Onset    Skin cancer Mother     Ovarian cancer Mother     Heart disease Mother     Alzheimer's disease Mother     Prostate cancer Father     Colon cancer Father     Cancer Father         glands    Melanoma Father     Alzheimer's disease Father     Melanoma Sister     Cancer Sister         cervix, abd     Melanoma Sister     Skin cancer Sister     Vaginal cancer Sister     Asthma Daughter     Fibromyalgia Daughter     Heart disease Maternal Uncle     Alcohol abuse Maternal Uncle     Cancer Paternal Aunt     Cerebral aneurysm Maternal Grandmother     Early death Maternal Grandmother         fall hit head    Heart disease Maternal Grandfather     Cancer Paternal Grandmother     Pancreatic cancer Paternal Grandmother     Macular degeneration Neg Hx     Retinal detachment Neg Hx     Glaucoma Neg Hx     Psoriasis Neg Hx     Lupus Neg Hx     Eczema Neg Hx        SOCHX:  Social History     Tobacco Use    Smoking status: Former     Current packs/day: 0.00     Average packs/day: 1 pack/day for 30.0 years (30.0 ttl pk-yrs)     Types: Cigarettes     Start date: 1984     Quit date: 2014     Years since quittin.5    Smokeless tobacco: Former     Types: Chew     Quit date: 1979   Substance Use Topics    Alcohol use: Yes     Alcohol/week: 10.0 standard drinks of alcohol     Types: 12 Standard drinks or equivalent per week     Comment: Drinks beer only and mostly during football season       ALLERGIES:  Patient has no known allergies.    CURRENT MEDICATIONS:  Current Outpatient Medications on File Prior to Visit   Medication Sig Dispense Refill    ascorbic acid, vitamin C, (VITAMIN C) 500 MG tablet Take 500 mg by mouth once daily.      calcium-vitamin D3 (OS-MAGGIE 500 + D3) 500 mg-5 mcg (200 unit) per tablet Take 1 tablet by mouth Daily.      COQ10, LIPOSOMAL UBIQUINOL, ORAL Take 1 capsule by mouth once daily.      cyanocobalamin (VITAMIN B-12) 1000  "MCG tablet Take 100 mcg by mouth once daily.      glucosamine-chondroitin 500-400 mg tablet Take 1 tablet by mouth 3 (three) times daily.      KRILL OIL ORAL Take 1 capsule by mouth once daily.      lisinopriL (PRINIVIL,ZESTRIL) 40 MG tablet Take 1 tablet (40 mg total) by mouth once daily. 90 tablet 3    multivitamin (THERAGRAN) per tablet Take 1 tablet by mouth once daily.      vitamin E 1000 UNIT capsule Take 1,000 Units by mouth once daily.      VITAMIN K2 ORAL Take by mouth.       No current facility-administered medications on file prior to visit.       REVIEW OF SYSTEMS:  ROS    GENERAL PHYSICAL EXAM:   Ht 5' 8" (1.727 m)   Wt 83 kg (183 lb)   BMI 27.83 kg/m²    GEN: well developed, well nourished, no acute distress   HENT: Normocephalic, atraumatic   EYES: No discharge, conjunctiva normal   NECK: Supple, non-tender   PULM: No wheezing, no respiratory distress   CV: RRR   ABD: Soft, non-tender    ORTHO EXAM:   Examination of the left hand middle finger reveals that there is a mass overlying the dorsum of the finger.  This is at the edge of the proximal nail fold.  This is consistent with a mucoid cyst.  He does have some nail changes noted from a recent injury.  He does have sensation intact at the tip of the finger and capillary refill is less than 2 seconds    RADIOLOGY:   None    ASSESSMENT:   Left middle finger mucoid    PLAN:  I have discussed treatment with the patient.  I did discuss the possibility of excision of the cyst.  I have also discussed aspiration and continued monitoring.  After discussion of the risks of the procedure informed consent has been obtained   2.  Will proceed with left middle finger mucoid cyst excision under local anesthesia    3. He will follow up with me 2 weeks postoperatively    "

## 2023-12-04 NOTE — PROGRESS NOTES
12/4/2023    Chief Complaint:  Chief Complaint   Patient presents with    Left Hand - Pain     Left middle finger cyst        HPI:  Mal Larkin is a 68 y.o. male, who presents to clinic today he has a history of left middle finger mucoid cyst.  He had it excised once in the past.  It has returned.  We have monitored but it has not gone away.  He is here today to discuss further treatment    PMHX:  Past Medical History:   Diagnosis Date    Adenomatous colon polyp     + dysplasia    Colon polyp     Fatty liver     Melanoma 2011    in situ - upper back    Primary hypertension 6/19/2012       PSHX:  Past Surgical History:   Procedure Laterality Date    APPENDECTOMY      BACK SURGERY      transverse process fracture    COLONOSCOPY  11/11/2013    Dr. Hayes, 3 year recheck    COLONOSCOPY N/A 12/20/2016    Procedure: COLONOSCOPY;  Surgeon: Atilio Hayes MD;  Location: [facility] ENDO;  Service: Endoscopy;  Laterality: N/A;    COLONOSCOPY N/A 1/20/2020    Procedure: COLONOSCOPY;  Surgeon: Atilio Hayes MD;  Location: [facility];  Service: Endoscopy;  Laterality: N/A;    FINGER MASS EXCISION Left 8/1/2023    Procedure: Left middle finger mucoid cyst excision;  Surgeon: Atilio Alaniz MD;  Location: [facility] OR;  Service: Orthopedics;  Laterality: Left;    FUSION OF LUMBAR SPINE USING POSTERIOR INTERBODY TECHNIQUE N/A 12/11/2018    Procedure: FUSION, SPINE, LUMBAR, PLIF L1-2, L2-3, L3-4, L4-5, L5-S1;  Surgeon: Vik Cox MD;  Location: [facility] OR;  Service: Orthopedics;  Laterality: N/A;    GASTRIC SLEEVE      HAND SURGERY      right dupuytrens release on 8/5/13    LUMBAR LAMINECTOMY  2011    LUMBAR LAMINECTOMY N/A 12/11/2018    Procedure: LAMINECTOMY, SPINE, LUMBAR L1, L2, L3 with  L4-L5 Re-exploration;  Surgeon: Vik Cox MD;  Location: [facility] OR;  Service: Orthopedics;  Laterality: N/A;    melanoma removal  2011    SPINE SURGERY  12/11/2018    Dr Cox L1 - S1. lamanectomy/fusion    WISDOM TOOTH  EXTRACTION         FMHX:  Family History   Problem Relation Age of Onset    Skin cancer Mother     Ovarian cancer Mother     Heart disease Mother     Alzheimer's disease Mother     Prostate cancer Father     Colon cancer Father     Cancer Father         glands    Melanoma Father     Alzheimer's disease Father     Melanoma Sister     Cancer Sister         cervix, abd     Melanoma Sister     Skin cancer Sister     Vaginal cancer Sister     Asthma Daughter     Fibromyalgia Daughter     Heart disease Maternal Uncle     Alcohol abuse Maternal Uncle     Cancer Paternal Aunt     Cerebral aneurysm Maternal Grandmother     Early death Maternal Grandmother         fall hit head    Heart disease Maternal Grandfather     Cancer Paternal Grandmother     Pancreatic cancer Paternal Grandmother     Macular degeneration Neg Hx     Retinal detachment Neg Hx     Glaucoma Neg Hx     Psoriasis Neg Hx     Lupus Neg Hx     Eczema Neg Hx        SOCHX:  Social History     Tobacco Use    Smoking status: Former     Current packs/day: 0.00     Average packs/day: 1 pack/day for 30.0 years (30.0 ttl pk-yrs)     Types: Cigarettes     Start date: 1984     Quit date: 2014     Years since quittin.5    Smokeless tobacco: Former     Types: Chew     Quit date: 1979   Substance Use Topics    Alcohol use: Yes     Alcohol/week: 10.0 standard drinks of alcohol     Types: 12 Standard drinks or equivalent per week     Comment: Drinks beer only and mostly during football season       ALLERGIES:  Patient has no known allergies.    CURRENT MEDICATIONS:  Current Outpatient Medications on File Prior to Visit   Medication Sig Dispense Refill    ascorbic acid, vitamin C, (VITAMIN C) 500 MG tablet Take 500 mg by mouth once daily.      calcium-vitamin D3 (OS-MAGGIE 500 + D3) 500 mg-5 mcg (200 unit) per tablet Take 1 tablet by mouth Daily.      COQ10, LIPOSOMAL UBIQUINOL, ORAL Take 1 capsule by mouth once daily.      cyanocobalamin (VITAMIN B-12) 1000  "MCG tablet Take 100 mcg by mouth once daily.      glucosamine-chondroitin 500-400 mg tablet Take 1 tablet by mouth 3 (three) times daily.      KRILL OIL ORAL Take 1 capsule by mouth once daily.      lisinopriL (PRINIVIL,ZESTRIL) 40 MG tablet Take 1 tablet (40 mg total) by mouth once daily. 90 tablet 3    multivitamin (THERAGRAN) per tablet Take 1 tablet by mouth once daily.      vitamin E 1000 UNIT capsule Take 1,000 Units by mouth once daily.      VITAMIN K2 ORAL Take by mouth.       No current facility-administered medications on file prior to visit.       REVIEW OF SYSTEMS:  ROS    GENERAL PHYSICAL EXAM:   Ht 5' 8" (1.727 m)   Wt 83 kg (183 lb)   BMI 27.83 kg/m²    GEN: well developed, well nourished, no acute distress   HENT: Normocephalic, atraumatic   EYES: No discharge, conjunctiva normal   NECK: Supple, non-tender   PULM: No wheezing, no respiratory distress   CV: RRR   ABD: Soft, non-tender    ORTHO EXAM:   Examination of the left hand middle finger reveals that there is a mass overlying the dorsum of the finger.  This is at the edge of the proximal nail fold.  This is consistent with a mucoid cyst.  He does have some nail changes noted from a recent injury.  He does have sensation intact at the tip of the finger and capillary refill is less than 2 seconds    RADIOLOGY:   None    ASSESSMENT:   Left middle finger mucoid    PLAN:  I have discussed treatment with the patient.  I did discuss the possibility of excision of the cyst.  I have also discussed aspiration and continued monitoring.  After discussion of the risks of the procedure informed consent has been obtained   2.  Will proceed with left middle finger mucoid cyst excision under local anesthesia    3. He will follow up with me 2 weeks postoperatively    "

## 2023-12-04 NOTE — PATIENT INSTRUCTIONS
Surgery Instructions:     Your surgery is scheduled on 01/02/2024 at the surgery center: [address] [facility], 1st floor, second entrance.    The pre-op department will be in contact with you prior to your procedure to review medications and instructions.       Nothing to eat or drink after midnight prior to day of surgery.    The surgery center will contact you the day prior to surgery to advise you of your arrival time for surgery.     Your post op appointment is scheduled on 01/17/2024 @ 8:20am.

## 2023-12-13 DIAGNOSIS — M67.442 DIGITAL MUCINOUS CYST OF FINGER OF LEFT HAND: Primary | ICD-10-CM

## 2023-12-13 RX ORDER — CEFAZOLIN SODIUM 2 G/50ML
2 SOLUTION INTRAVENOUS
Status: CANCELLED | OUTPATIENT
Start: 2023-12-13

## 2023-12-17 ENCOUNTER — PATIENT MESSAGE (OUTPATIENT)
Dept: ADMINISTRATIVE | Facility: OTHER | Age: 68
End: 2023-12-17
Payer: MEDICARE

## 2024-01-02 ENCOUNTER — HOSPITAL ENCOUNTER (OUTPATIENT)
Facility: HOSPITAL | Age: 69
Discharge: HOME | End: 2024-01-02
Attending: ORTHOPAEDIC SURGERY | Admitting: ORTHOPAEDIC SURGERY
Payer: MEDICARE

## 2024-01-02 DIAGNOSIS — M67.442 DIGITAL MUCINOUS CYST OF FINGER OF LEFT HAND: ICD-10-CM

## 2024-01-02 PROCEDURE — 88304 TISSUE EXAM BY PATHOLOGIST: CPT | Mod: 26,HCNC,, | Performed by: PATHOLOGY

## 2024-01-02 PROCEDURE — 25000003 PHARM REV CODE 250: Mod: HCNC,PO | Performed by: ORTHOPAEDIC SURGERY

## 2024-01-02 PROCEDURE — 63600175 PHARM REV CODE 636 W HCPCS: Mod: HCNC,PO | Performed by: PHYSICIAN ASSISTANT

## 2024-01-02 PROCEDURE — 71000015 HC POSTOP RECOV 1ST HR: Mod: HCNC,PO | Performed by: ORTHOPAEDIC SURGERY

## 2024-01-02 PROCEDURE — 63600175 PHARM REV CODE 636 W HCPCS: Mod: HCNC,PO | Performed by: ORTHOPAEDIC SURGERY

## 2024-01-02 PROCEDURE — 36000707: Mod: HCNC,PO | Performed by: ORTHOPAEDIC SURGERY

## 2024-01-02 PROCEDURE — 36000706: Mod: HCNC,PO | Performed by: ORTHOPAEDIC SURGERY

## 2024-01-02 PROCEDURE — 88304 TISSUE EXAM BY PATHOLOGIST: CPT | Mod: HCNC,PO | Performed by: PATHOLOGY

## 2024-01-02 PROCEDURE — 26160 REMOVE TENDON SHEATH LESION: CPT | Mod: F2,HCNC,, | Performed by: ORTHOPAEDIC SURGERY

## 2024-01-02 RX ORDER — BUPIVACAINE HYDROCHLORIDE 2.5 MG/ML
INJECTION, SOLUTION EPIDURAL; INFILTRATION; INTRACAUDAL
Status: DISCONTINUED | OUTPATIENT
Start: 2024-01-02 | End: 2024-01-02 | Stop reason: HOSPADM

## 2024-01-02 RX ORDER — LIDOCAINE HYDROCHLORIDE 10 MG/ML
INJECTION INFILTRATION; PERINEURAL
Status: DISCONTINUED | OUTPATIENT
Start: 2024-01-02 | End: 2024-01-02 | Stop reason: HOSPADM

## 2024-01-02 RX ORDER — SODIUM CHLORIDE, SODIUM LACTATE, POTASSIUM CHLORIDE, CALCIUM CHLORIDE 600; 310; 30; 20 MG/100ML; MG/100ML; MG/100ML; MG/100ML
INJECTION, SOLUTION INTRAVENOUS CONTINUOUS
Status: DISCONTINUED | OUTPATIENT
Start: 2024-01-02 | End: 2024-01-02 | Stop reason: HOSPADM

## 2024-01-02 RX ORDER — CEFAZOLIN SODIUM 2 G/50ML
2 SOLUTION INTRAVENOUS
Status: COMPLETED | OUTPATIENT
Start: 2024-01-02 | End: 2024-01-02

## 2024-01-02 RX ADMIN — SODIUM CHLORIDE, POTASSIUM CHLORIDE, SODIUM LACTATE AND CALCIUM CHLORIDE: 600; 310; 30; 20 INJECTION, SOLUTION INTRAVENOUS at 07:01

## 2024-01-02 NOTE — DISCHARGE INSTRUCTIONS
Procedure:  Left middle finger mucoid cyst excision    1. Please keep the dressing clean and dry.  Do not take it off and do not get it wet     2. Light activity with the left hand is allowed but please limit lifting to 2-3 lb     3. Over-the-counter ibuprofen or Tylenol can be taken as needed for pain     4. If there are any questions or concerns please call Dr. Alaniz's office at [phone]     5. Follow up with Dr. Alaniz in 2 weeks

## 2024-01-02 NOTE — DISCHARGE SUMMARY
[facility] - Surgery  Discharge Note  Short Stay    Procedure(s) (LRB):  Left middle finger mucoid cyst excision (Left)      OUTCOME: Patient tolerated treatment/procedure well without complication and is now ready for discharge.    DISPOSITION: Home or Self Care    FINAL DIAGNOSIS:  Digital mucinous cyst of finger of left hand    FOLLOWUP: In clinic    DISCHARGE INSTRUCTIONS:    Discharge Procedure Orders   Diet general     Activity as tolerated     Keep surgical extremity elevated     Lifting restrictions   Order Comments: Please limit lifting with the left arm and hand to 2-3 lb     Leave dressing on - Keep it clean, dry, and intact until clinic visit     Call MD for:  temperature >100.4     Call MD for:  persistent nausea and vomiting     Call MD for:  severe uncontrolled pain     Call MD for:  difficulty breathing, headache or visual disturbances     Call MD for:  redness, tenderness, or signs of infection (pain, swelling, redness, odor or green/yellow discharge around incision site)     Call MD for:  hives     Call MD for:  persistent dizziness or light-headedness     Call MD for:  extreme fatigue        TIME SPENT ON DISCHARGE: 15 minutes

## 2024-01-02 NOTE — OP NOTE
Mal Larkin  1955    DATE OF SURGERY: 1/2/2024     PRE-OPERATIVE DIAGNOSIS:  Left middle finger mucoid cyst    POST-OPERATIVE DIAGNOSIS:  Left middle finger mucoid cyst     ANESTHESIA TYPE:  Local    BLOOD LOSS:  Less than 10 cc    TOURNIQUET TIME:  Approximately 10 minutes    SURGEON: Dr Alaniz    ASSISTANT:  Isabel Oconnell    PROCEDURE:  Left middle finger mucoid cyst excision    IMPLANTS:  None     SPECIMENS:  Left middle finger mucoid cyst for pathology    INDICATION:     Mr. Larkin has a history of a mucoid cyst to his left middle finger.  He had it excised once in the past.  He had a recurrence of the cyst.  Due to the recurrence I discussed the possibility of excision.  Informed consent was then obtained.    PROCEDURE IN DETAIL:     Mr. Larkin was transported to the operating room and was placed supine on the operating room table. All appropriate points were padded. The left arm and hand was prepped and draped in the normal sterile fashion. Time out was called. The correct patient, correct operative site, correct procedure, antibiotic administration which consisted of 2 g of Ancef, and allergies to medications which are to Patient has no known allergies.  were reviewed. Time in was then called.     Attention was turned to the left middle finger.  There was noted to be a mucoid cyst overlying the dorsum of the finger.  This measures 1 x 0.5 cm in size.  A 1 cm incision was made directly over the dorsum of the cyst.  The incision was carried through the skin.  Subcutaneous tissues were dissected sharply.  There was noted to be a mucoid cyst.  This cyst was completely excised.  The nail matrix was identified and was noted to be intact.  The cyst was traced proximally to the level of the interphalangeal joint.  This was removed completely from the joint surface.  There was noted be no significant osteophyte.  At this point the wound was copiously irrigated.  It was again visualized.  There was no  remaining portion of the cyst.  The wound was then closed with 5 0 chromic.  The wound was covered with Xeroform, Miko wrap and Coban dressing.    The patient was awakened from anesthesia and was transported to the recovery room in stable condition. All lap, needle, sponge, and equipment counts were correct at the end of the case.    POST-OPERATIVE PLAN:     Patient will keep this dressing in place for 2 weeks which time he will follow up with me.  I will review the results of the pathology at that time

## 2024-01-03 VITALS
BODY MASS INDEX: 27.74 KG/M2 | HEIGHT: 68 IN | SYSTOLIC BLOOD PRESSURE: 170 MMHG | DIASTOLIC BLOOD PRESSURE: 80 MMHG | TEMPERATURE: 98 F | WEIGHT: 183 LBS | RESPIRATION RATE: 16 BRPM | OXYGEN SATURATION: 99 % | HEART RATE: 62 BPM

## 2024-01-05 LAB
FINAL PATHOLOGIC DIAGNOSIS: NORMAL
GROSS: NORMAL
Lab: NORMAL

## 2024-01-17 ENCOUNTER — OFFICE VISIT (OUTPATIENT)
Dept: ORTHOPEDICS | Facility: CLINIC | Age: 69
End: 2024-01-17
Payer: MEDICARE

## 2024-01-17 DIAGNOSIS — M67.442 DIGITAL MUCINOUS CYST OF FINGER OF LEFT HAND: Primary | ICD-10-CM

## 2024-01-17 PROCEDURE — 1159F MED LIST DOCD IN RCRD: CPT | Mod: HCNC,CPTII,S$GLB, | Performed by: ORTHOPAEDIC SURGERY

## 2024-01-17 PROCEDURE — 1101F PT FALLS ASSESS-DOCD LE1/YR: CPT | Mod: HCNC,CPTII,S$GLB, | Performed by: ORTHOPAEDIC SURGERY

## 2024-01-17 PROCEDURE — 1157F ADVNC CARE PLAN IN RCRD: CPT | Mod: HCNC,CPTII,S$GLB, | Performed by: ORTHOPAEDIC SURGERY

## 2024-01-17 PROCEDURE — 3288F FALL RISK ASSESSMENT DOCD: CPT | Mod: HCNC,CPTII,S$GLB, | Performed by: ORTHOPAEDIC SURGERY

## 2024-01-17 PROCEDURE — 99024 POSTOP FOLLOW-UP VISIT: CPT | Mod: HCNC,S$GLB,, | Performed by: ORTHOPAEDIC SURGERY

## 2024-01-17 PROCEDURE — 1126F AMNT PAIN NOTED NONE PRSNT: CPT | Mod: HCNC,CPTII,S$GLB, | Performed by: ORTHOPAEDIC SURGERY

## 2024-01-17 PROCEDURE — 99999 PR PBB SHADOW E&M-EST. PATIENT-LVL II: CPT | Mod: PBBFAC,HCNC,, | Performed by: ORTHOPAEDIC SURGERY

## 2024-01-17 NOTE — PROGRESS NOTES
Mr Larkin returns to clinic today.  Has a history of left middle finger mucoid cyst.  He is overall doing well.  He is 2 weeks post resection.      Physical exam: Examination left middle finger reveals that the wound is healing well.  The sutures have come out.  He does have some eschar.  There is no edema or erythema.      Assessment:  Status post left middle finger mucoid cyst excision     Plan:    1. His wound was cleaned today    2.  He can leave it open to the air and begin range of motion    3.  Will follow up on a p.r.n. basis

## 2024-03-02 ENCOUNTER — PATIENT MESSAGE (OUTPATIENT)
Dept: ADMINISTRATIVE | Facility: OTHER | Age: 69
End: 2024-03-02
Payer: MEDICARE

## 2024-04-26 ENCOUNTER — LAB VISIT (OUTPATIENT)
Dept: LAB | Facility: HOSPITAL | Age: 69
End: 2024-04-26
Payer: MEDICARE

## 2024-04-26 ENCOUNTER — OFFICE VISIT (OUTPATIENT)
Dept: FAMILY MEDICINE | Facility: CLINIC | Age: 69
End: 2024-04-26
Payer: MEDICARE

## 2024-04-26 VITALS
DIASTOLIC BLOOD PRESSURE: 80 MMHG | BODY MASS INDEX: 31.34 KG/M2 | RESPIRATION RATE: 16 BRPM | SYSTOLIC BLOOD PRESSURE: 140 MMHG | HEIGHT: 68 IN | OXYGEN SATURATION: 97 % | WEIGHT: 206.81 LBS | HEART RATE: 69 BPM

## 2024-04-26 DIAGNOSIS — H25.9 AGE-RELATED CATARACT OF BOTH EYES, UNSPECIFIED AGE-RELATED CATARACT TYPE: ICD-10-CM

## 2024-04-26 DIAGNOSIS — Z01.818 PRE-OP TESTING: ICD-10-CM

## 2024-04-26 DIAGNOSIS — I10 PRIMARY HYPERTENSION: ICD-10-CM

## 2024-04-26 DIAGNOSIS — Z01.818 PRE-OP TESTING: Primary | ICD-10-CM

## 2024-04-26 LAB
ALBUMIN SERPL BCP-MCNC: 4.1 G/DL (ref 3.5–5.2)
ALP SERPL-CCNC: 63 U/L (ref 55–135)
ALT SERPL W/O P-5'-P-CCNC: 12 U/L (ref 10–44)
ANION GAP SERPL CALC-SCNC: 10 MMOL/L (ref 8–16)
AST SERPL-CCNC: 22 U/L (ref 10–40)
BASOPHILS # BLD AUTO: 0.04 K/UL (ref 0–0.2)
BASOPHILS NFR BLD: 0.7 % (ref 0–1.9)
BILIRUB SERPL-MCNC: 0.5 MG/DL (ref 0.1–1)
BUN SERPL-MCNC: 11 MG/DL (ref 8–23)
CALCIUM SERPL-MCNC: 10.4 MG/DL (ref 8.7–10.5)
CHLORIDE SERPL-SCNC: 101 MMOL/L (ref 95–110)
CO2 SERPL-SCNC: 28 MMOL/L (ref 23–29)
CREAT SERPL-MCNC: 0.8 MG/DL (ref 0.5–1.4)
DIFFERENTIAL METHOD BLD: ABNORMAL
EOSINOPHIL # BLD AUTO: 0.1 K/UL (ref 0–0.5)
EOSINOPHIL NFR BLD: 1.2 % (ref 0–8)
ERYTHROCYTE [DISTWIDTH] IN BLOOD BY AUTOMATED COUNT: 12.3 % (ref 11.5–14.5)
EST. GFR  (NO RACE VARIABLE): >60 ML/MIN/1.73 M^2
GLUCOSE SERPL-MCNC: 91 MG/DL (ref 70–110)
HCT VFR BLD AUTO: 45.9 % (ref 40–54)
HGB BLD-MCNC: 16.1 G/DL (ref 14–18)
IMM GRANULOCYTES # BLD AUTO: 0.03 K/UL (ref 0–0.04)
IMM GRANULOCYTES NFR BLD AUTO: 0.5 % (ref 0–0.5)
LYMPHOCYTES # BLD AUTO: 1.8 K/UL (ref 1–4.8)
LYMPHOCYTES NFR BLD: 30.9 % (ref 18–48)
MCH RBC QN AUTO: 33.5 PG (ref 27–31)
MCHC RBC AUTO-ENTMCNC: 35.1 G/DL (ref 32–36)
MCV RBC AUTO: 96 FL (ref 82–98)
MONOCYTES # BLD AUTO: 0.6 K/UL (ref 0.3–1)
MONOCYTES NFR BLD: 9.9 % (ref 4–15)
NEUTROPHILS # BLD AUTO: 3.2 K/UL (ref 1.8–7.7)
NEUTROPHILS NFR BLD: 56.8 % (ref 38–73)
NRBC BLD-RTO: 0 /100 WBC
PLATELET # BLD AUTO: 240 K/UL (ref 150–450)
PMV BLD AUTO: 10.1 FL (ref 9.2–12.9)
POTASSIUM SERPL-SCNC: 5.2 MMOL/L (ref 3.5–5.1)
PROT SERPL-MCNC: 7 G/DL (ref 6–8.4)
RBC # BLD AUTO: 4.8 M/UL (ref 4.6–6.2)
SODIUM SERPL-SCNC: 139 MMOL/L (ref 136–145)
WBC # BLD AUTO: 5.67 K/UL (ref 3.9–12.7)

## 2024-04-26 PROCEDURE — 93005 ELECTROCARDIOGRAM TRACING: CPT | Mod: HCNC,S$GLB,,

## 2024-04-26 PROCEDURE — 1157F ADVNC CARE PLAN IN RCRD: CPT | Mod: HCNC,CPTII,S$GLB,

## 2024-04-26 PROCEDURE — 4010F ACE/ARB THERAPY RXD/TAKEN: CPT | Mod: HCNC,CPTII,S$GLB,

## 2024-04-26 PROCEDURE — 1160F RVW MEDS BY RX/DR IN RCRD: CPT | Mod: HCNC,CPTII,S$GLB,

## 2024-04-26 PROCEDURE — 85025 COMPLETE CBC W/AUTO DIFF WBC: CPT | Mod: HCNC

## 2024-04-26 PROCEDURE — 3288F FALL RISK ASSESSMENT DOCD: CPT | Mod: HCNC,CPTII,S$GLB,

## 2024-04-26 PROCEDURE — 3077F SYST BP >= 140 MM HG: CPT | Mod: HCNC,CPTII,S$GLB,

## 2024-04-26 PROCEDURE — 36415 COLL VENOUS BLD VENIPUNCTURE: CPT | Mod: HCNC,PO

## 2024-04-26 PROCEDURE — 1159F MED LIST DOCD IN RCRD: CPT | Mod: HCNC,CPTII,S$GLB,

## 2024-04-26 PROCEDURE — 1101F PT FALLS ASSESS-DOCD LE1/YR: CPT | Mod: HCNC,CPTII,S$GLB,

## 2024-04-26 PROCEDURE — 3008F BODY MASS INDEX DOCD: CPT | Mod: HCNC,CPTII,S$GLB,

## 2024-04-26 PROCEDURE — 80053 COMPREHEN METABOLIC PANEL: CPT | Mod: HCNC

## 2024-04-26 PROCEDURE — 1126F AMNT PAIN NOTED NONE PRSNT: CPT | Mod: HCNC,CPTII,S$GLB,

## 2024-04-26 PROCEDURE — 3079F DIAST BP 80-89 MM HG: CPT | Mod: HCNC,CPTII,S$GLB,

## 2024-04-26 PROCEDURE — 99999 PR PBB SHADOW E&M-EST. PATIENT-LVL IV: CPT | Mod: PBBFAC,HCNC,,

## 2024-04-26 PROCEDURE — 99214 OFFICE O/P EST MOD 30 MIN: CPT | Mod: HCNC,S$GLB,,

## 2024-04-26 PROCEDURE — 93010 ELECTROCARDIOGRAM REPORT: CPT | Mod: HCWC,S$GLB,, | Performed by: INTERNAL MEDICINE

## 2024-04-26 NOTE — PROGRESS NOTES
Subjective:       Patient ID: Mal Larkin is a 68 y.o. male.    Chief Complaint: Pre-op Exam    Patient presents to the clinic for a pre-op clearance for cataract surgery.     Surgery is scheduled for May 9th and then 2 weeks after that for the other eye.     Patient Active Problem List:     Primary hypertension     Fatty liver     Personal history of malignant melanoma of skin     Dupuytren's contracture of both hands     Dupuytren's contracture of right hand     Adenomatous colon polyp     Hx of colonic polyps     Spondylolisthesis at L5-S1 level     Spinal stenosis, lumbar region, with neurogenic claudication     History of lumbar laminectomy     DDD (degenerative disc disease), lumbar     History of colon polyps     History of bariatric surgery     Aortic atherosclerosis     Digital mucinous cyst of finger of left hand    Has no new complaints or concerns today.     Patient educated on plan of care, verbalized understanding.         Review of Systems   Constitutional:  Negative for activity change, appetite change, chills, diaphoresis and fever.   HENT:  Negative for congestion, ear pain, postnasal drip, sinus pressure, sneezing and sore throat.    Eyes:  Negative for pain, discharge, redness and itching.   Respiratory:  Negative for apnea, cough, chest tightness, shortness of breath and wheezing.    Cardiovascular:  Negative for chest pain and leg swelling.   Gastrointestinal:  Negative for abdominal distention, abdominal pain, constipation, diarrhea, nausea and vomiting.   Genitourinary:  Negative for difficulty urinating, dysuria, flank pain and frequency.   Skin:  Negative for color change, rash and wound.   Neurological:  Negative for dizziness.   All other systems reviewed and are negative.      Patient Active Problem List   Diagnosis    Primary hypertension    Fatty liver    Personal history of malignant melanoma of skin    Dupuytren's contracture of both hands    Dupuytren's contracture of right  hand    Adenomatous colon polyp    Hx of colonic polyps    Spondylolisthesis at L5-S1 level    Spinal stenosis, lumbar region, with neurogenic claudication    History of lumbar laminectomy    DDD (degenerative disc disease), lumbar    History of colon polyps    History of bariatric surgery    Aortic atherosclerosis    Digital mucinous cyst of finger of left hand       Objective:      Physical Exam  Vitals and nursing note reviewed.   Constitutional:       Appearance: Normal appearance. He is not ill-appearing.   HENT:      Head: Normocephalic and atraumatic.      Nose: Nose normal.   Eyes:      General: Lids are normal.   Cardiovascular:      Rate and Rhythm: Normal rate and regular rhythm.      Pulses: Normal pulses.      Heart sounds: Normal heart sounds.   Pulmonary:      Effort: Pulmonary effort is normal. No tachypnea or respiratory distress.      Breath sounds: Normal breath sounds. No wheezing.   Abdominal:      General: Bowel sounds are normal. There is no distension.      Palpations: Abdomen is soft.      Tenderness: There is no abdominal tenderness.   Musculoskeletal:         General: Normal range of motion.      Cervical back: Full passive range of motion without pain and normal range of motion.      Left lower leg: No edema.   Skin:     General: Skin is warm and dry.   Neurological:      Mental Status: He is alert and oriented to person, place, and time.   Psychiatric:         Mood and Affect: Mood normal.         Behavior: Behavior normal.         Lab Results   Component Value Date    WBC 5.67 04/26/2024    HGB 16.1 04/26/2024    HCT 45.9 04/26/2024     04/26/2024    CHOL 227 (H) 05/04/2023    TRIG 22 (L) 05/04/2023     (H) 05/04/2023    ALT 12 04/26/2024    AST 22 04/26/2024     04/26/2024    K 5.2 (H) 04/26/2024     04/26/2024    CREATININE 0.8 04/26/2024    BUN 11 04/26/2024    CO2 28 04/26/2024    TSH 0.646 07/06/2016    PSA 0.57 05/04/2023    INR 1.0 11/30/2018    HGBA1C  "4.9 07/06/2018     The ASCVD Risk score (Shine DK, et al., 2019) failed to calculate for the following reasons:    The valid HDL cholesterol range is 20 to 100 mg/dL  Visit Vitals  BP (!) 140/80   Pulse 69   Resp 16   Ht 5' 8" (1.727 m)   Wt 93.8 kg (206 lb 12.7 oz)   SpO2 97%   BMI 31.44 kg/m²      Assessment:       1. Pre-op testing    2. Primary hypertension    3. Age-related cataract of both eyes, unspecified age-related cataract type        Plan:       1. Pre-op testing  -     CBC W/ AUTO DIFFERENTIAL; Future; Expected date: 04/26/2024  -     COMPREHENSIVE METABOLIC PANEL; Future; Expected date: 04/26/2024  -     IN OFFICE EKG 12-LEAD (to Muse)   - EKG personally reviewed by me- NSR   - Cleared for cardiac surgery    2. Primary hypertension   - Stable-Continue Lisinopril   - Continue current plan of care    3. Age-related cataract of both eyes, unspecified age-related cataract type   - Scheduled for bilateral cataract surgery this month     Follow up if symptoms worsen or fail to improve.      Future Appointments       Date Provider Specialty Appt Notes    5/20/2024 Tito Marino MD Family Medicine annual/preop catarct surgery 5/9/24 7/2/2024 Judith Martinez MD Dermatology Annual dermatology exam.             "

## 2024-04-26 NOTE — PATIENT INSTRUCTIONS
Thank you for allowing me to be part of your healthcare team at [facility]. It is a pleasure to care for you today.   Please take all of your medications as instructed and follow all new instructions from your visit today.  If you received labs or medical tests today you should hear information about results or scheduling either by phone or mychart within approximately a week.   If you have any questions or concerns please do not hesitate to call. Have a blessed day and I hope to see you again soon.  KINDRA Leon,     If you are due for any health screening(s) below please notify me so we can arrange them to be ordered and scheduled. Most healthy patients at your age complete them, but you are free to accept or refuse.     If you can't do it, I'll definitely understand. If you can, I'd certainly appreciate it!    All of your core healthy metrics are met.      Lets manage your high blood pressure     Your blood pressure was above 140/90 today during your visit. We recommend that you schedule a nurse visit in two weeks to check your blood pressure and discuss ways to support your health goals.     You can also manage your health and record your blood pressure from the comfort of home by keeping a daily blood pressure log. These results are shared with and reviewed by your provider. Please print this form (Daily Blood Pressure Log) to assist you in keeping track of your blood pressure at home.     Schedule your nurse visit in two weeks to learn more about how to track and manage high blood pressure.    Daily Blood Pressure Log    Name:__________________________________                  Date of Birth:_________    Average Blood Pressure:  __________      Date: Time  (a.m.) Blood  Pressure: Pulse  Rate: Time  (p.m.) Blood  Pressure : Pulse  Rate:   Sample 8:37 127/83 84

## 2024-04-29 ENCOUNTER — TELEPHONE (OUTPATIENT)
Dept: FAMILY MEDICINE | Facility: CLINIC | Age: 69
End: 2024-04-29
Payer: MEDICARE

## 2024-04-29 DIAGNOSIS — Z01.818 PRE-OP TESTING: Primary | ICD-10-CM

## 2024-04-29 LAB
OHS QRS DURATION: 88 MS
OHS QTC CALCULATION: 448 MS

## 2024-04-29 NOTE — TELEPHONE ENCOUNTER
----- Message from RT Marita sent at 4/27/2024  8:54 AM CDT -----  Regarding: EKG  Please put the EKG order in for the EKG performed on    4/26/24     ,  then attach the order to the EKG appointment or the MD appointment for the date performed.      EKG will not be read until order is received.    Thank you

## 2024-05-10 ENCOUNTER — PATIENT OUTREACH (OUTPATIENT)
Dept: ADMINISTRATIVE | Facility: HOSPITAL | Age: 69
End: 2024-05-10
Payer: MEDICARE

## 2024-05-10 NOTE — PROGRESS NOTES
Population Health Chart Review & Patient Outreach Details      Additional [facility] Health Notes:      The patient is showing as non-compliant on the Statin Therapy Measure     PLACED IN APPT NOTES TO DISCUSS WITH PCP  LUIS 2018         Updates Requested / Reviewed:      Health Maintenance Topics Overdue:      VBHM Score: 3     Uncontrolled BP  Hemoglobin A1c  LDCT Lung Screen    Pneumonia Vaccine  RSV Vaccine                  Health Maintenance Topic(s) Outreach Outcomes & Actions Taken:    Medication Adherence / Statins - Outreach Outcomes & Actions Taken  : Primary Care Appt Scheduled

## 2024-05-20 ENCOUNTER — OFFICE VISIT (OUTPATIENT)
Dept: FAMILY MEDICINE | Facility: CLINIC | Age: 69
End: 2024-05-20
Attending: FAMILY MEDICINE
Payer: MEDICARE

## 2024-05-20 VITALS
WEIGHT: 208.13 LBS | HEIGHT: 68 IN | HEART RATE: 70 BPM | DIASTOLIC BLOOD PRESSURE: 80 MMHG | OXYGEN SATURATION: 97 % | TEMPERATURE: 98 F | BODY MASS INDEX: 31.54 KG/M2 | SYSTOLIC BLOOD PRESSURE: 136 MMHG

## 2024-05-20 DIAGNOSIS — E87.5 HYPERKALEMIA: ICD-10-CM

## 2024-05-20 DIAGNOSIS — D12.6 ADENOMATOUS POLYP OF COLON, UNSPECIFIED PART OF COLON: ICD-10-CM

## 2024-05-20 DIAGNOSIS — Z85.820 PERSONAL HISTORY OF MALIGNANT MELANOMA OF SKIN: ICD-10-CM

## 2024-05-20 DIAGNOSIS — Z12.11 COLON CANCER SCREENING: ICD-10-CM

## 2024-05-20 DIAGNOSIS — I70.0 AORTIC ATHEROSCLEROSIS: ICD-10-CM

## 2024-05-20 DIAGNOSIS — Z12.5 PROSTATE CANCER SCREENING: ICD-10-CM

## 2024-05-20 DIAGNOSIS — I10 PRIMARY HYPERTENSION: ICD-10-CM

## 2024-05-20 DIAGNOSIS — K76.0 FATTY LIVER: ICD-10-CM

## 2024-05-20 DIAGNOSIS — Z98.84 HISTORY OF BARIATRIC SURGERY: ICD-10-CM

## 2024-05-20 DIAGNOSIS — Z00.00 PREVENTATIVE HEALTH CARE: Primary | ICD-10-CM

## 2024-05-20 PROCEDURE — 99397 PER PM REEVAL EST PAT 65+ YR: CPT | Mod: HCNC,S$GLB,, | Performed by: FAMILY MEDICINE

## 2024-05-20 PROCEDURE — 1126F AMNT PAIN NOTED NONE PRSNT: CPT | Mod: HCNC,CPTII,S$GLB, | Performed by: FAMILY MEDICINE

## 2024-05-20 PROCEDURE — 1160F RVW MEDS BY RX/DR IN RCRD: CPT | Mod: HCNC,CPTII,S$GLB, | Performed by: FAMILY MEDICINE

## 2024-05-20 PROCEDURE — 1159F MED LIST DOCD IN RCRD: CPT | Mod: HCNC,CPTII,S$GLB, | Performed by: FAMILY MEDICINE

## 2024-05-20 PROCEDURE — 3079F DIAST BP 80-89 MM HG: CPT | Mod: HCNC,CPTII,S$GLB, | Performed by: FAMILY MEDICINE

## 2024-05-20 PROCEDURE — 3288F FALL RISK ASSESSMENT DOCD: CPT | Mod: HCNC,CPTII,S$GLB, | Performed by: FAMILY MEDICINE

## 2024-05-20 PROCEDURE — 1101F PT FALLS ASSESS-DOCD LE1/YR: CPT | Mod: HCNC,CPTII,S$GLB, | Performed by: FAMILY MEDICINE

## 2024-05-20 PROCEDURE — 3075F SYST BP GE 130 - 139MM HG: CPT | Mod: HCNC,CPTII,S$GLB, | Performed by: FAMILY MEDICINE

## 2024-05-20 PROCEDURE — 3008F BODY MASS INDEX DOCD: CPT | Mod: HCNC,CPTII,S$GLB, | Performed by: FAMILY MEDICINE

## 2024-05-20 PROCEDURE — 1157F ADVNC CARE PLAN IN RCRD: CPT | Mod: HCNC,CPTII,S$GLB, | Performed by: FAMILY MEDICINE

## 2024-05-20 PROCEDURE — 99999 PR PBB SHADOW E&M-EST. PATIENT-LVL IV: CPT | Mod: PBBFAC,HCNC,, | Performed by: FAMILY MEDICINE

## 2024-05-20 PROCEDURE — 4010F ACE/ARB THERAPY RXD/TAKEN: CPT | Mod: HCNC,CPTII,S$GLB, | Performed by: FAMILY MEDICINE

## 2024-05-20 RX ORDER — LISINOPRIL 40 MG/1
40 TABLET ORAL DAILY
Qty: 90 TABLET | Refills: 3 | Status: SHIPPED | OUTPATIENT
Start: 2024-05-20

## 2024-05-20 NOTE — PROGRESS NOTES
Subjective:       Patient ID: Mal Larkin is a 68 y.o. male.    Chief Complaint: Annual Exam    68-year-old male coming in for annual exam.  He has a history of primary hypertension, degenerative disc disease of the lumbar spine status post laminectomies in 2011 and laminectomy and fusion in 2018.  He has had a malignant melanoma of the skin removed from the back, bariatric surgery, aortic atherosclerosis, Dupuytren's contracture of both hands, fatty liver disease, adenomatous colon polyps and he will be due for his colonoscopy in January 2025.  He had a CMP and CBC done for another provider recently in the results were satisfactory except for a potassium level of 5.2.  He is on lisinopril and is taking a potassium supplement.    He is not fasting for lab today and will schedule lab at a later date including a recheck on the potassium.    Past Medical History:  No date: Adenomatous colon polyp      Comment:  + dysplasia  No date: Colon polyp  No date: Fatty liver  2011: Melanoma      Comment:  in situ - upper back  6/19/2012: Primary hypertension    Past Surgical History:  No date: APPENDECTOMY  No date: BACK SURGERY      Comment:  transverse process fracture  11/11/2013: COLONOSCOPY      Comment:  Dr. Hayes, 3 year recheck  12/20/2016: COLONOSCOPY; N/A      Comment:  Procedure: COLONOSCOPY;  Surgeon: Atilio Hayes MD;                Location: [facility];  Service: Endoscopy;  Laterality:                N/A;  1/20/2020: COLONOSCOPY; N/A      Comment:  Procedure: COLONOSCOPY;  Surgeon: Atilio Hayes MD;                Location: [facility];  Service: Endoscopy;  Laterality:                N/A;  8/1/2023: FINGER MASS EXCISION; Left      Comment:  Procedure: Left middle finger mucoid cyst excision;                 Surgeon: Atilio Alaniz MD;  Location: [facility] OR;                 Service: Orthopedics;  Laterality: Left;  1/2/2024: FINGER MASS EXCISION; Left      Comment:  Procedure: Left middle finger  mucoid cyst excision;                 Surgeon: Atilio Alaniz MD;  Location: [facility] OR;                 Service: Orthopedics;  Laterality: Left;  12/11/2018: FUSION OF LUMBAR SPINE USING POSTERIOR INTERBODY   TECHNIQUE; N/A      Comment:  Procedure: FUSION, SPINE, LUMBAR, PLIF L1-2, L2-3, L3-4,               L4-5, L5-S1;  Surgeon: Vik Cox MD;  Location:                [facility] OR;  Service: Orthopedics;  Laterality: N/A;  No date: GASTRIC SLEEVE  No date: HAND SURGERY      Comment:  right dupuytrens release on 8/5/13  2011: LUMBAR LAMINECTOMY  12/11/2018: LUMBAR LAMINECTOMY; N/A      Comment:  Procedure: LAMINECTOMY, SPINE, LUMBAR L1, L2, L3 with                 L4-L5 Re-exploration;  Surgeon: Vik Cox MD;                 Location: [facility] OR;  Service: Orthopedics;  Laterality:                N/A;  2011: melanoma removal  12/11/2018: SPINE SURGERY      Comment:  Dr Cox L1 - S1. lamanectomy/fusion  No date: WISDOM TOOTH EXTRACTION    Review of patient's family history indicates:  Problem: Skin cancer      Relation: Mother          Name:               Age of Onset: (Not Specified)  Problem: Ovarian cancer      Relation: Mother          Name:               Age of Onset: (Not Specified)  Problem: Heart disease      Relation: Mother          Name:               Age of Onset: (Not Specified)  Problem: Alzheimer's disease      Relation: Mother          Name:               Age of Onset: (Not Specified)  Problem: Prostate cancer      Relation: Father          Name:               Age of Onset: (Not Specified)  Problem: Colon cancer      Relation: Father          Name:               Age of Onset: (Not Specified)  Problem: Cancer      Relation: Father          Name:               Age of Onset: (Not Specified)              Comment: glands  Problem: Melanoma      Relation: Father          Name:               Age of Onset: (Not Specified)  Problem: Alzheimer's disease      Relation: Father          Name:                Age of Onset: (Not Specified)  Problem: Melanoma      Relation: Sister          Name:               Age of Onset: (Not Specified)  Problem: Cancer      Relation: Sister          Name:               Age of Onset: (Not Specified)              Comment: cervix, abd   Problem: Melanoma      Relation: Sister          Name:               Age of Onset: (Not Specified)  Problem: Skin cancer      Relation: Sister          Name:               Age of Onset: (Not Specified)  Problem: Vaginal cancer      Relation: Sister          Name:               Age of Onset: (Not Specified)  Problem: Asthma      Relation: Daughter          Name:               Age of Onset: (Not Specified)  Problem: Fibromyalgia      Relation: Daughter          Name:               Age of Onset: (Not Specified)  Problem: Heart disease      Relation: Maternal Uncle          Name:               Age of Onset: (Not Specified)  Problem: Alcohol abuse      Relation: Maternal Uncle          Name:               Age of Onset: (Not Specified)  Problem: Cancer      Relation: Paternal Aunt          Name:               Age of Onset: (Not Specified)  Problem: Cerebral aneurysm      Relation: Maternal Grandmother          Name:               Age of Onset: (Not Specified)  Problem: Early death      Relation: Maternal Grandmother          Name:               Age of Onset: (Not Specified)              Comment: fall hit head  Problem: Heart disease      Relation: Maternal Grandfather          Name:               Age of Onset: (Not Specified)  Problem: Cancer      Relation: Paternal Grandmother          Name:               Age of Onset: (Not Specified)  Problem: Pancreatic cancer      Relation: Paternal Grandmother          Name:               Age of Onset: (Not Specified)  Problem: Macular degeneration      Relation: Neg Hx          Name:               Age of Onset: (Not Specified)  Problem: Retinal detachment      Relation: Neg Hx          Name:               Age of Onset:  (Not Specified)  Problem: Glaucoma      Relation: Neg Hx          Name:               Age of Onset: (Not Specified)  Problem: Psoriasis      Relation: Neg Hx          Name:               Age of Onset: (Not Specified)  Problem: Lupus      Relation: Neg Hx          Name:               Age of Onset: (Not Specified)  Problem: Eczema      Relation: Neg Hx          Name:               Age of Onset: (Not Specified)    Social History    Tobacco Use      Smoking status: Former        Packs/day: 0.00        Years: 1 pack/day for 30.0 years (30.0 ttl pk-yrs)        Types: Cigarettes        Start date: 5/8/1984        Quit date: 5/8/2014        Years since quitting: 10.0      Smokeless tobacco: Former        Types: Chew        Quit date: 9/22/1979    Alcohol use: Yes      Alcohol/week: 10.0 standard drinks of alcohol      Types: 12 Standard drinks or equivalent per week      Comment: Drinks beer only and mostly during football season    Drug use: No    Current Outpatient Medications on File Prior to Visit:  ascorbic acid, vitamin C, (VITAMIN C) 500 MG tablet, Take 500 mg by mouth once daily., Disp: , Rfl:   calcium-vitamin D3 (OS-MAGGIE 500 + D3) 500 mg-5 mcg (200 unit) per tablet, Take 1 tablet by mouth Daily., Disp: , Rfl:   COQ10, LIPOSOMAL UBIQUINOL, ORAL, Take 1 capsule by mouth once daily., Disp: , Rfl:   cyanocobalamin (VITAMIN B-12) 1000 MCG tablet, Take 100 mcg by mouth once daily., Disp: , Rfl:   glucosamine-chondroitin 500-400 mg tablet, Take 1 tablet by mouth 3 (three) times daily., Disp: , Rfl:   KRILL OIL ORAL, Take 1 capsule by mouth once daily., Disp: , Rfl:   multivitamin (THERAGRAN) per tablet, Take 1 tablet by mouth once daily., Disp: , Rfl:   VITAMIN K2 ORAL, Take by mouth., Disp: , Rfl:   [DISCONTINUED] lisinopriL (PRINIVIL,ZESTRIL) 40 MG tablet, Take 1 tablet (40 mg total) by mouth once daily., Disp: 90 tablet, Rfl: 3    No current facility-administered medications on file prior to visit.          Review of  Systems   Constitutional:  Negative for activity change, chills, fatigue and fever.   HENT:  Negative for congestion, ear pain, rhinorrhea and sore throat.    Eyes:  Negative for visual disturbance.   Respiratory:  Negative for cough, chest tightness and shortness of breath.    Cardiovascular:  Negative for chest pain and palpitations.   Gastrointestinal:  Negative for abdominal pain, blood in stool, constipation, diarrhea, nausea and vomiting.   Genitourinary:  Negative for difficulty urinating, dysuria and hematuria.   Musculoskeletal:  Negative for arthralgias and neck pain.   Skin:  Negative for rash.   Neurological:  Negative for dizziness and headaches.   Psychiatric/Behavioral:  Negative for sleep disturbance.        Objective:      Physical Exam  Vitals and nursing note reviewed.   Constitutional:       General: He is not in acute distress.     Appearance: Normal appearance. He is well-developed. He is obese. He is not ill-appearing, toxic-appearing or diaphoretic.      Comments: Initial blood pressure mildly elevated, repeat was more satisfactory.  Reviewed his log from digital hypertension clinic with most systolics in the 120s and low 130s  Normal pulse with a regular rhythm   Obese with a BMI of 31.6 he is up 19.7 lb since his May 1, 2023 visit   HENT:      Head: Normocephalic and atraumatic.      Right Ear: Tympanic membrane, ear canal and external ear normal. There is no impacted cerumen.      Left Ear: Tympanic membrane, ear canal and external ear normal. There is no impacted cerumen.      Ears:      Comments: Hearing aids     Nose: Nose normal. No congestion or rhinorrhea.      Mouth/Throat:      Mouth: Mucous membranes are moist.      Pharynx: Oropharynx is clear. No oropharyngeal exudate or posterior oropharyngeal erythema.   Eyes:      General: No scleral icterus.        Right eye: No discharge.         Left eye: No discharge.      Conjunctiva/sclera: Conjunctivae normal.      Pupils: Pupils are  equal, round, and reactive to light.   Neck:      Thyroid: No thyromegaly.      Vascular: No carotid bruit or JVD.      Trachea: No tracheal deviation.   Cardiovascular:      Rate and Rhythm: Normal rate and regular rhythm.      Heart sounds: Normal heart sounds. No murmur heard.     No friction rub. No gallop.   Pulmonary:      Effort: Pulmonary effort is normal. No respiratory distress.      Breath sounds: Normal breath sounds. No stridor. No wheezing, rhonchi or rales.   Chest:      Chest wall: No tenderness.   Abdominal:      General: Bowel sounds are normal. There is no distension.      Palpations: Abdomen is soft. There is no mass.      Tenderness: There is no abdominal tenderness. There is no guarding or rebound.      Hernia: No hernia is present.   Musculoskeletal:         General: No swelling, tenderness, deformity or signs of injury. Normal range of motion.      Cervical back: Normal range of motion and neck supple. No rigidity or tenderness.      Right lower leg: Edema (1+) present.      Left lower leg: Edema (1+) present.   Lymphadenopathy:      Cervical: No cervical adenopathy.   Skin:     General: Skin is warm and dry.      Coloration: Skin is not jaundiced or pale.      Findings: No bruising, erythema, lesion or rash.   Neurological:      General: No focal deficit present.      Mental Status: He is alert and oriented to person, place, and time. Mental status is at baseline.      Cranial Nerves: No cranial nerve deficit.      Sensory: No sensory deficit.      Motor: No weakness.      Coordination: Coordination normal.      Gait: Gait normal.      Deep Tendon Reflexes: Reflexes are normal and symmetric.   Psychiatric:         Mood and Affect: Mood normal.         Behavior: Behavior normal.         Thought Content: Thought content normal.         Judgment: Judgment normal.         Assessment:       1. Preventative health care    2. Primary hypertension    3. Hyperkalemia    4. Aortic atherosclerosis     5. Personal history of malignant melanoma of skin    6. History of bariatric surgery    7. Fatty liver    8. Adenomatous polyp of colon, unspecified part of colon    9. Colon cancer screening    10. Prostate cancer screening    11. BMI 31.0-31.9,adult        Plan:       1. Preventative health care  - Basic Metabolic Panel; Future  - Lipid Panel; Future  - PSA, Screening; Future    2. Primary hypertension  Adequate control, no changes needed  - Basic Metabolic Panel; Future  - Lipid Panel; Future  - lisinopriL (PRINIVIL,ZESTRIL) 40 MG tablet; Take 1 tablet (40 mg total) by mouth once daily.  Dispense: 90 tablet; Refill: 3    3. Hyperkalemia  Lab Results   Component Value Date    K 5.2 (H) 04/26/2024     Possible lab error, recheck level and if confirmed he will need to stop his potassiums supplement and potentially change his lisinopril  - Basic Metabolic Panel; Future    4. Aortic atherosclerosis  Asymptomatic, monitor blood pressure, blood cholesterol levels, and keep an eye on blood sugar    5. Personal history of malignant melanoma of skin  No sign of recurrence    6. History of bariatric surgery  Regaining weight    7. Fatty liver  Lab Results   Component Value Date    ALT 12 04/26/2024    AST 22 04/26/2024    ALKPHOS 63 04/26/2024    BILITOT 0.5 04/26/2024     Improved    8. Adenomatous polyp of colon, unspecified part of colon  Colonoscopy due in January 2025  - Case Request Endoscopy: COLONOSCOPY    9. Colon cancer screening  See above  - Case Request Endoscopy: COLONOSCOPY    10. Prostate cancer screening  Await PSA result  - PSA, Screening; Future    11. BMI 31.0-31.9,adult

## 2024-05-21 ENCOUNTER — LAB VISIT (OUTPATIENT)
Dept: LAB | Facility: HOSPITAL | Age: 69
End: 2024-05-21
Attending: FAMILY MEDICINE
Payer: MEDICARE

## 2024-05-21 DIAGNOSIS — E87.5 HYPERKALEMIA: ICD-10-CM

## 2024-05-21 DIAGNOSIS — Z00.00 PREVENTATIVE HEALTH CARE: ICD-10-CM

## 2024-05-21 DIAGNOSIS — I10 PRIMARY HYPERTENSION: ICD-10-CM

## 2024-05-21 DIAGNOSIS — Z12.5 PROSTATE CANCER SCREENING: ICD-10-CM

## 2024-05-21 LAB
ANION GAP SERPL CALC-SCNC: 9 MMOL/L (ref 8–16)
BUN SERPL-MCNC: 9 MG/DL (ref 8–23)
CALCIUM SERPL-MCNC: 9.3 MG/DL (ref 8.7–10.5)
CHLORIDE SERPL-SCNC: 102 MMOL/L (ref 95–110)
CHOLEST SERPL-MCNC: 211 MG/DL (ref 120–199)
CHOLEST/HDLC SERPL: 1.9 {RATIO} (ref 2–5)
CO2 SERPL-SCNC: 25 MMOL/L (ref 23–29)
COMPLEXED PSA SERPL-MCNC: 0.46 NG/ML (ref 0–4)
CREAT SERPL-MCNC: 0.7 MG/DL (ref 0.5–1.4)
EST. GFR  (NO RACE VARIABLE): >60 ML/MIN/1.73 M^2
GLUCOSE SERPL-MCNC: 93 MG/DL (ref 70–110)
HDLC SERPL-MCNC: 114 MG/DL (ref 40–75)
HDLC SERPL: 54 % (ref 20–50)
LDLC SERPL CALC-MCNC: 88.4 MG/DL (ref 63–159)
NONHDLC SERPL-MCNC: 97 MG/DL
POTASSIUM SERPL-SCNC: 4.2 MMOL/L (ref 3.5–5.1)
SODIUM SERPL-SCNC: 136 MMOL/L (ref 136–145)
TRIGL SERPL-MCNC: 43 MG/DL (ref 30–150)

## 2024-05-21 PROCEDURE — 36415 COLL VENOUS BLD VENIPUNCTURE: CPT | Mod: HCNC,PO | Performed by: FAMILY MEDICINE

## 2024-05-21 PROCEDURE — 84153 ASSAY OF PSA TOTAL: CPT | Mod: HCNC | Performed by: FAMILY MEDICINE

## 2024-05-21 PROCEDURE — 80061 LIPID PANEL: CPT | Mod: HCNC | Performed by: FAMILY MEDICINE

## 2024-05-21 PROCEDURE — 80048 BASIC METABOLIC PNL TOTAL CA: CPT | Mod: HCNC | Performed by: FAMILY MEDICINE

## 2024-05-23 ENCOUNTER — TELEPHONE (OUTPATIENT)
Dept: GASTROENTEROLOGY | Facility: CLINIC | Age: 69
End: 2024-05-23
Payer: MEDICARE

## 2024-05-23 NOTE — TELEPHONE ENCOUNTER
Returned call to patient to advise he is not due for a colonoscopy until 1/2025. A recall has been set.

## 2024-05-23 NOTE — TELEPHONE ENCOUNTER
----- Message from Tania Pugh sent at 5/23/2024  1:18 PM CDT -----  Contact: Patient  Type:  Appointment Request    Caller is requesting a sooner appointment.  Caller declined first available appointment listed below.  Caller will not accept being placed on the waitlist and is requesting a message be sent to doctor.    Name of Caller:  Patient  When is the first available appointment?  N/A    Would the patient rather a call back or a response via [facility]?  Call back  Best Call Back Number:  [phone]    Additional Information:   States he would like to speak with someone to schedule his colonoscopy - states he is on a five-year schedule with his colonoscopies - please call to schedule - thank you

## 2024-07-02 ENCOUNTER — OFFICE VISIT (OUTPATIENT)
Dept: DERMATOLOGY | Facility: CLINIC | Age: 69
End: 2024-07-02
Payer: MEDICARE

## 2024-07-02 VITALS — BODY MASS INDEX: 31.54 KG/M2 | WEIGHT: 208.13 LBS | HEIGHT: 68 IN

## 2024-07-02 DIAGNOSIS — L82.1 SEBORRHEIC KERATOSES: ICD-10-CM

## 2024-07-02 DIAGNOSIS — Z85.820 HISTORY OF MALIGNANT MELANOMA OF SKIN: ICD-10-CM

## 2024-07-02 DIAGNOSIS — D22.9 MULTIPLE BENIGN NEVI: ICD-10-CM

## 2024-07-02 DIAGNOSIS — L57.0 ACTINIC KERATOSES: ICD-10-CM

## 2024-07-02 DIAGNOSIS — D18.01 CHERRY ANGIOMA: ICD-10-CM

## 2024-07-02 DIAGNOSIS — D48.5 NEOPLASM OF UNCERTAIN BEHAVIOR OF SKIN: Primary | ICD-10-CM

## 2024-07-02 DIAGNOSIS — L81.4 SOLAR LENTIGO: ICD-10-CM

## 2024-07-02 DIAGNOSIS — L57.8 OTHER SKIN CHANGES DUE TO CHRONIC EXPOSURE TO NONIONIZING RADIATION: ICD-10-CM

## 2024-07-02 PROCEDURE — 3288F FALL RISK ASSESSMENT DOCD: CPT | Mod: CPTII,S$GLB,, | Performed by: DERMATOLOGY

## 2024-07-02 PROCEDURE — 17000 DESTRUCT PREMALG LESION: CPT | Mod: XS,S$GLB,, | Performed by: DERMATOLOGY

## 2024-07-02 PROCEDURE — 11102 TANGNTL BX SKIN SINGLE LES: CPT | Mod: S$GLB,,, | Performed by: DERMATOLOGY

## 2024-07-02 PROCEDURE — 1157F ADVNC CARE PLAN IN RCRD: CPT | Mod: CPTII,S$GLB,, | Performed by: DERMATOLOGY

## 2024-07-02 PROCEDURE — 99213 OFFICE O/P EST LOW 20 MIN: CPT | Mod: 25,S$GLB,, | Performed by: DERMATOLOGY

## 2024-07-02 PROCEDURE — 1101F PT FALLS ASSESS-DOCD LE1/YR: CPT | Mod: CPTII,S$GLB,, | Performed by: DERMATOLOGY

## 2024-07-02 PROCEDURE — 1126F AMNT PAIN NOTED NONE PRSNT: CPT | Mod: CPTII,S$GLB,, | Performed by: DERMATOLOGY

## 2024-07-02 PROCEDURE — 17003 DESTRUCT PREMALG LES 2-14: CPT | Mod: S$GLB,,, | Performed by: DERMATOLOGY

## 2024-07-02 PROCEDURE — 3008F BODY MASS INDEX DOCD: CPT | Mod: CPTII,S$GLB,, | Performed by: DERMATOLOGY

## 2024-07-02 PROCEDURE — 4010F ACE/ARB THERAPY RXD/TAKEN: CPT | Mod: CPTII,S$GLB,, | Performed by: DERMATOLOGY

## 2024-07-02 PROCEDURE — 88305 TISSUE EXAM BY PATHOLOGIST: CPT | Mod: HCNC | Performed by: DERMATOLOGY

## 2024-07-02 PROCEDURE — 1160F RVW MEDS BY RX/DR IN RCRD: CPT | Mod: CPTII,S$GLB,, | Performed by: DERMATOLOGY

## 2024-07-02 PROCEDURE — 1159F MED LIST DOCD IN RCRD: CPT | Mod: CPTII,S$GLB,, | Performed by: DERMATOLOGY

## 2024-07-02 NOTE — PROGRESS NOTES
Subjective:      Patient ID:  Mal Larkin is a 68 y.o. male who presents for   Chief Complaint   Patient presents with    Skin Check     TBSE    Spot     Right shoulder    Skin Tags     neck     LOV: 6/29/23 - ISK, SK, lentigo, nevi, angioma, h/o MM    Skin Check - TBSE    C/o spot on right shoulder  Darker per wife    C/o skin tag  on neck  Cuts when shaving    Derm hx  H/o AKs treated with cryo  H/o MIS upper back 2011  + FH melanoma in father and both sisters     Rides bike 15 miles per day, uses sun protection. Plays a lot of golf.     Current Outpatient Medications:   ·  ascorbic acid, vitamin C, (VITAMIN C) 500 MG tablet, Take 500 mg by mouth once daily., Disp: , Rfl:   ·  calcium-vitamin D3 (OS-MAGGIE 500 + D3) 500 mg-5 mcg (200 unit) per tablet, Take 1 tablet by mouth Daily., Disp: , Rfl:   ·  COQ10, LIPOSOMAL UBIQUINOL, ORAL, Take 1 capsule by mouth once daily., Disp: , Rfl:   ·  cyanocobalamin (VITAMIN B-12) 1000 MCG tablet, Take 100 mcg by mouth once daily., Disp: , Rfl:   ·  glucosamine-chondroitin 500-400 mg tablet, Take 1 tablet by mouth 3 (three) times daily., Disp: , Rfl:   ·  KRILL OIL ORAL, Take 1 capsule by mouth once daily., Disp: , Rfl:   ·  lisinopriL (PRINIVIL,ZESTRIL) 40 MG tablet, Take 1 tablet (40 mg total) by mouth once daily., Disp: 90 tablet, Rfl: 3  ·  multivitamin (THERAGRAN) per tablet, Take 1 tablet by mouth once daily., Disp: , Rfl:   ·  VITAMIN K2 ORAL, Take by mouth., Disp: , Rfl:         Review of Systems   Constitutional:  Negative for fever, chills, weight gain, fatigue, night sweats and malaise. Weight loss: bariatric surgery 5/2018 lost 135lbs.  HENT:  Negative for headaches.    Respiratory:  Negative for cough and shortness of breath.    Gastrointestinal:  Negative for indigestion.   Skin:  Positive for activity-related sunscreen use and wears hat. Negative for daily sunscreen use and recent sunburn.   Neurological:  Negative for headaches.   Hematologic/Lymphatic:  Date of Service: 03/26/2019    SUBJECTIVE:  The patient is a 70-year-old male who presents complaining of a dry cough for more than a year.  Upon careful questioning, patient admits the cough has been going on for several years.  The patient did in fact try Flonase nasal spray back in 2015 and he states that did not help.  The patient admits he has a little runny nose in the morning.  He admits that he has some slight intermittent postnasal drainage.  He admits this is likely contributing to the cough.  The patient states some days he will cough a couple times, some days he coughs more often and some days very little.  The patient states he is mostly very good at night.  He will have a little cough every now and then at night, but it is mainly during the day when he has symptoms and the symptoms are sporadic during the day.  He does use a humidifier at night.    The patient denies any purulent drainage.  He denies any sinus pain or pressure.  Denies any fever or chills.  He denies other associated symptoms.    History were reviewed and are as charted in the EMR.    REVIEW OF SYSTEMS:  The patient states he has an ulcer on his left calf.  He has been treated by Dr. Pineda.  The patient did go to wound care at [facility] but they did not have any advice other than what Dr. Pineda told him and so he just follows up with Dr. Pineda.  The patient states he did have ankle brachial indices testing.  He is wearing compression stockings 25-30 mmHg by his report.    The patient has known very significant coronary artery disease.  He did have cardiac catheterization done on 07/18/2018 by his cardiologist, Dr. Crystal.  He had angioplasty and 4 stents done at that time.  He has had previous bypass surgery and that was done in 2013.    The patient had echocardiogram done on 02/27/2019.  Ejection fraction was 58%.  There was grade 1 diastolic dysfunction noted.    The patient denies any sputum out of his chest.  He has some chronic  Negative for adenopathy. Bruises/bleeds easily.       Objective:   Physical Exam   Constitutional: He appears well-developed and well-nourished. No distress.   Neurological: He is alert and oriented to person, place, and time. He is not disoriented.   Psychiatric: He has a normal mood and affect.   Skin:   Areas Examined (abnormalities noted in diagram):   Scalp / Hair Palpated and Inspected  Head / Face Inspection Performed  Neck Inspection Performed  Chest / Axilla Inspection Performed  Abdomen Inspection Performed  Genitals / Buttocks / Groin Inspection Performed  Back Inspection Performed  RUE Inspected  LUE Inspection Performed  RLE Inspected  LLE Inspection Performed  Nails and Digits Inspection Performed                     Diagram Legend     Erythematous scaling macule/papule c/w actinic keratosis       Vascular papule c/w angioma      Pigmented verrucoid papule/plaque c/w seborrheic keratosis      Yellow umbilicated papule c/w sebaceous hyperplasia      Irregularly shaped tan macule c/w lentigo     1-2 mm smooth white papules consistent with Milia      Movable subcutaneous cyst with punctum c/w epidermal inclusion cyst      Subcutaneous movable cyst c/w pilar cyst      Firm pink to brown papule c/w dermatofibroma      Pedunculated fleshy papule(s) c/w skin tag(s)      Evenly pigmented macule c/w junctional nevus     Mildly variegated pigmented, slightly irregular-bordered macule c/w mildly atypical nevus      Flesh colored to evenly pigmented papule c/w intradermal nevus       Pink pearly papule/plaque c/w basal cell carcinoma      Erythematous hyperkeratotic cursted plaque c/w SCC      Surgical scar with no sign of skin cancer recurrence      Open and closed comedones      Inflammatory papules and pustules      Verrucoid papule consistent consistent with wart     Erythematous eczematous patches and plaques     Dystrophic onycholytic nail with subungual debris c/w onychomycosis     Umbilicated papule     shortness of breath with exertion, which is unchanged.  He denies wheezing.  He admits to occasional mild reflux symptoms.  He states sometimes he will feel a little dizzy on top of his head.  He denies chest pain or palpitations.  He denies orthopnea or paroxysmal nocturnal dyspnea.  He denies sore throat.  He denies neck pain or masses.    OBJECTIVE:  GENERAL:  The patient is alert and in no apparent distress.  HEENT:  Eyes:  Conjunctivae clear.  Ear canals have cerumen bilaterally so TMs are not visualized secondary to cerumen.  Nose patent.  Throat clear.  Mucous membranes are moist.  NECK:  Supple without adenopathy, thyromegaly, or jugular venous distention.  No supraclavicular adenopathy noted.  LUNGS:  Clear.  Normal respiratory effort.  HEART:  Regular rate and rhythm without murmur or gallop.  ABDOMEN:  Soft, positive bowel sounds, nontender.  No masses or organomegaly.    ASSESSMENT:  1.  Persistent dry cough.  By history, it sounds like this is most likely from postnasal drainage.  We discussed other possible etiologies.  2.  Coronary artery disease, which is clinically stable.  He did have angioplasty and 4 stents placed in July of last year.  3.  Long-term medication management.    PLAN:  I told the patient to have chest x-rays and they showed no acute cardiopulmonary disease.  I told the patient to see ENT in consultation.  If they do not find a cause for his cough,  he should let me know and then I would recommend that we do pulmonary function tests and then have him see pulmonology.  I did also review reflux precautions and the importance of that and I told him that acid reflux could cause cough.  If he is getting reflux symptoms, he can let me know.  He is already on Pantoprazole 20 mg daily and he will continue that.  Follow up with me will be based on what is found by ENT and as needed.      Dictated By: Carlos Manuel New MD  Signing Provider: Carlos Manuel New MD    /[facility] ([id number])  DD:  03/31/2019 16:18:51 TD: 04/02/2019 02:35:10    Copy Sent To:    Erythematous-base heme-crusted tan verrucoid plaque consistent with inflamed seborrheic keratosis     Erythematous Silvery Scaling Plaque c/w Psoriasis     See annotation      Assessment / Plan:      Pathology Orders:       Normal Orders This Visit    Specimen to Pathology, Dermatology     Questions:    Procedure Type: Dermatology and skin neoplasms    Number of Specimens: 1    ------------------------: -------------------------    Spec 1 Procedure: Biopsy    Spec 1 Clinical Impression: BCC vs other    Spec 1 Source: left temple    Release to patient:           Neoplasm of uncertain behavior of skin  -     Specimen to Pathology, Dermatology  Shave biopsy procedure note:    Shave biopsy performed after verbal consent including risk of infection, scar, recurrence, need for additional treatment of site. Area prepped with alcohol, anesthetized with approximately 1.0cc of 1% lidocaine with epinephrine. Lesional tissue shaved with razor blade. Hemostasis achieved with application of aluminum chloride followed by hyfrecation. No complications. Dressing applied. Wound care explained.    Actinic keratoses  Cryosurgery Procedure Note    Verbal consent from the patient is obtained and the patient is aware of the precancerous quality and need for treatment of these lesions. Liquid nitrogen cryosurgery is applied to the 2 actinic keratoses, as detailed in the physical exam, to produce a freeze injury. The patient is aware that blisters may form and is instructed on wound care with gentle cleansing and use of vaseline ointment to keep moist until healed. The patient is supplied a handout on cryosurgery and is instructed to call if lesions do not completely resolve.    History of malignant melanoma of skin  Area of previous melanoma examined. Site well healed with no signs of recurrence.    Total body skin examination performed today including at least 12 points as noted in physical examination. No lesions suspicious for  malignancy noted.    Solar lentigo  This is a benign hyperpigmented sun induced lesion. Daily sun protection will reduce the number of new lesions. Treatment of these benign lesions are considered cosmetic.    Multiple benign nevi  Careful dermoscopy evaluation of nevi performed with none identified as needing biopsy today  Monitor for new mole or moles that are becoming bigger, darker, irritated, or developing irregular borders.     Seborrheic keratoses  These are benign inherited growths without a malignant potential. Reassurance given to patient. No treatment is necessary.     Cherry angioma  This is a benign vascular lesion. Reassurance given. No treatment required.     Other skin changes due to chronic exposure to nonionizing radiation  Patient instructed in importance in daily broad spectrum sun protection of at least spf 30. Mineral sunscreen ingredients preferred (Zinc +/- Titanium) and can be found OTC.   Recommend Elta MD for daily use on face and neck.  Patient encouraged to wear hat for all outdoor exposure.   Also discussed sun avoidance and use of protective clothing.             Follow up in about 1 year (around 7/2/2025).

## 2024-07-02 NOTE — PATIENT INSTRUCTIONS
Shave Biopsy Wound Care    Your doctor has performed a shave biopsy today.  A band aid and vaseline ointment has been placed over the site.  This should remain in place for 24 hours.  It is recommended that you keep the area dry for the first 24 hours.  After 24 hours, you may remove the band aid and wash the area with warm soap and water and apply Vaseline jelly.  Many patients prefer to use Neosporin or Bacitracin ointment.  This is acceptable; however, know that you can develop an allergy to this medication even if you have used it safely for years.  It is important to keep the area moist.  Letting it dry out and get air slows healing time, and will worsen the scar.  Band aid is optional after first 24 hours.      If you notice increasing redness, tenderness, pain, or yellow drainage at the biopsy site, please notify your doctor.  These are signs of an infection.    If your biopsy site is bleeding, apply firm pressure for 15 minutes straight.  Repeat for another 15 minutes, if it is still bleeding.   If the surgical site continues to bleed, then please contact your doctor.       [facility] - DERMATOLOGY  [address], [address]  [place] [address]  Dept: [phone]  Dept Fax: [phone]      CRYOSURGERY      Your doctor has used a method called cryosurgery to treat your skin condition. Cryosurgery refers to the use of very cold substances to treat a variety of skin conditions such as warts, pre-skin cancers, molluscum contagiosum, sun spots, and several benign growths. The substance we use in cryosurgery is liquid nitrogen and is so cold (-195 degrees Celsius) that is burns when administered.     Following treatment in the office, the skin may immediately burn and become red. You may find the area around the lesion is affected as well. It is sometimes necessary to treat not only the lesion, but a small area of the surrounding normal skin to achieve a good response.     A  blister, and even a blood filled blister, may form after treatment.   This is a normal response. If the blister is painful, it is acceptable to sterilize a needle and with rubbing alcohol and gently pop the blister. It is important that you gently wash the area with soap and warm water as the blister fluid may contain wart virus if a wart was treated. Do no remove the roof of the blister.     The area treated can take anywhere from 1-3 weeks to heal. Healing time depends on the kind skin lesion treated, the location, and how aggressively the lesion was treated. It is recommended that the areas treated are covered with Vaseline or bacitracin ointment and a band-aid. If a band-aid is not practical, just ointment applied several times per day will do. Keeping these areas moist will speed the healing time.    Treatment with liquid nitrogen can leave a scar. In dark skin, it may be a light or dark scar, in light skin it may be a white or pink scar. These will generally fade with time, but may never go away completely.     If you have any concerns after your treatment, please feel free to call the office.         [facility] - DERMATOLOGY  [address], [address]  [place] [address]  Dept: [phone]  Dept Fax: [phone]

## 2024-07-05 LAB
FINAL PATHOLOGIC DIAGNOSIS: NORMAL
GROSS: NORMAL
Lab: NORMAL
MICROSCOPIC EXAM: NORMAL

## 2024-07-16 ENCOUNTER — OFFICE VISIT (OUTPATIENT)
Dept: URGENT CARE | Facility: CLINIC | Age: 69
End: 2024-07-16
Payer: MEDICARE

## 2024-07-16 VITALS
OXYGEN SATURATION: 97 % | WEIGHT: 208 LBS | SYSTOLIC BLOOD PRESSURE: 179 MMHG | BODY MASS INDEX: 31.52 KG/M2 | HEIGHT: 68 IN | TEMPERATURE: 98 F | RESPIRATION RATE: 16 BRPM | HEART RATE: 80 BPM | DIASTOLIC BLOOD PRESSURE: 93 MMHG

## 2024-07-16 DIAGNOSIS — M79.605 LEG PAIN, BILATERAL: ICD-10-CM

## 2024-07-16 DIAGNOSIS — S80.811A ABRASION OF ANTERIOR LOWER LEG, RIGHT, INITIAL ENCOUNTER: ICD-10-CM

## 2024-07-16 DIAGNOSIS — S60.512A ABRASION, HAND, LEFT, INITIAL ENCOUNTER: ICD-10-CM

## 2024-07-16 DIAGNOSIS — M79.604 LEG PAIN, BILATERAL: ICD-10-CM

## 2024-07-16 DIAGNOSIS — S80.812A ABRASION OF ANTERIOR LOWER LEG, LEFT, INITIAL ENCOUNTER: ICD-10-CM

## 2024-07-16 DIAGNOSIS — S60.511A ABRASION, HAND, RIGHT, INITIAL ENCOUNTER: ICD-10-CM
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
## 2024-07-16 PROCEDURE — 99204 OFFICE O/P NEW MOD 45 MIN: CPT | Mod: S$GLB,,,

## 2024-07-16 PROCEDURE — 73590 X-RAY EXAM OF LOWER LEG: CPT | Mod: RT,S$GLB,, | Performed by: RADIOLOGY

## 2024-07-16 PROCEDURE — 73590 X-RAY EXAM OF LOWER LEG: CPT | Mod: LT,S$GLB,, | Performed by: RADIOLOGY
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
## 2024-07-16 NOTE — PATIENT INSTRUCTIONS
Wound Care to include wash and rinse with antibacterial soap and warm water, pat dry.     Monitor for signs of infection including warmth, redness, and drainage from the site.     Follow up with primary care    Rest  ICE  Elevate    NSAIDs for relief of pain and inflammation    Avoid aggravating affected area.

## 2024-08-28 ENCOUNTER — TELEPHONE (OUTPATIENT)
Dept: DERMATOLOGY | Facility: CLINIC | Age: 69
End: 2024-08-28
Payer: MEDICARE

## 2024-08-28 NOTE — TELEPHONE ENCOUNTER
----- Message from Rosalva Perdue sent at 8/28/2024  8:23 AM CDT -----  Type:  Sooner Apoointment Request    Caller is requesting a sooner appointment.  Caller declined first available appointment listed below.  Caller will not accept being placed on the waitlist and is requesting a message be sent to doctor.  Name of [facility] PT  When is the first available appointment?   Symptoms: cysts  right fore arm   Would the patient rather a call back or a response via [facility]?  Call back  Best Call Back Number: [phone]  Additional Information:  only wnt to see  Dr Paz

## 2024-08-29 ENCOUNTER — PATIENT MESSAGE (OUTPATIENT)
Dept: ADMINISTRATIVE | Facility: OTHER | Age: 69
End: 2024-08-29
Payer: MEDICARE

## 2024-09-10 ENCOUNTER — OFFICE VISIT (OUTPATIENT)
Dept: FAMILY MEDICINE | Facility: CLINIC | Age: 69
End: 2024-09-10
Attending: FAMILY MEDICINE
Payer: MEDICARE

## 2024-09-10 VITALS
HEIGHT: 68 IN | TEMPERATURE: 98 F | BODY MASS INDEX: 31.39 KG/M2 | SYSTOLIC BLOOD PRESSURE: 132 MMHG | DIASTOLIC BLOOD PRESSURE: 72 MMHG | OXYGEN SATURATION: 99 % | WEIGHT: 207.13 LBS | HEART RATE: 67 BPM

## 2024-09-10 DIAGNOSIS — L98.9 SKIN LESION OF RIGHT ARM: Primary | ICD-10-CM

## 2024-09-10 PROCEDURE — 3075F SYST BP GE 130 - 139MM HG: CPT | Mod: HCNC,CPTII,S$GLB, | Performed by: FAMILY MEDICINE

## 2024-09-10 PROCEDURE — 1160F RVW MEDS BY RX/DR IN RCRD: CPT | Mod: HCNC,CPTII,S$GLB, | Performed by: FAMILY MEDICINE

## 2024-09-10 PROCEDURE — 3078F DIAST BP <80 MM HG: CPT | Mod: HCNC,CPTII,S$GLB, | Performed by: FAMILY MEDICINE

## 2024-09-10 PROCEDURE — 99999 PR PBB SHADOW E&M-EST. PATIENT-LVL IV: CPT | Mod: PBBFAC,HCNC,, | Performed by: FAMILY MEDICINE

## 2024-09-10 PROCEDURE — 1159F MED LIST DOCD IN RCRD: CPT | Mod: HCNC,CPTII,S$GLB, | Performed by: FAMILY MEDICINE

## 2024-09-10 PROCEDURE — 4010F ACE/ARB THERAPY RXD/TAKEN: CPT | Mod: HCNC,CPTII,S$GLB, | Performed by: FAMILY MEDICINE

## 2024-09-10 PROCEDURE — 1126F AMNT PAIN NOTED NONE PRSNT: CPT | Mod: HCNC,CPTII,S$GLB, | Performed by: FAMILY MEDICINE

## 2024-09-10 PROCEDURE — 99212 OFFICE O/P EST SF 10 MIN: CPT | Mod: HCNC,S$GLB,, | Performed by: FAMILY MEDICINE

## 2024-09-10 PROCEDURE — 3288F FALL RISK ASSESSMENT DOCD: CPT | Mod: HCNC,CPTII,S$GLB, | Performed by: FAMILY MEDICINE

## 2024-09-10 PROCEDURE — 3008F BODY MASS INDEX DOCD: CPT | Mod: HCNC,CPTII,S$GLB, | Performed by: FAMILY MEDICINE

## 2024-09-10 PROCEDURE — 1157F ADVNC CARE PLAN IN RCRD: CPT | Mod: HCNC,CPTII,S$GLB, | Performed by: FAMILY MEDICINE

## 2024-09-10 PROCEDURE — 1101F PT FALLS ASSESS-DOCD LE1/YR: CPT | Mod: HCNC,CPTII,S$GLB, | Performed by: FAMILY MEDICINE

## 2024-09-10 NOTE — PROGRESS NOTES
Subjective:       Patient ID: Mal Larkin is a 69 y.o. male.    Chief Complaint: Follow-up (Spot on arm)    69-year-old male coming in with a skin lesion on his right arm that started a few months ago in initially looked like an insect bite.  It has been growing rapidly in his very tender.  He has not seen any drainage from the lesion.  He does have a history of skin cancers including melanoma.    Past Medical History:  No date: Adenomatous colon polyp      Comment:  + dysplasia  No date: Colon polyp  No date: Fatty liver  2011: Melanoma      Comment:  in situ - upper back  6/19/2012: Primary hypertension    Past Surgical History:  No date: APPENDECTOMY  No date: BACK SURGERY      Comment:  transverse process fracture  11/11/2013: COLONOSCOPY      Comment:  Dr. Hayes, 3 year recheck  12/20/2016: COLONOSCOPY; N/A      Comment:  Procedure: COLONOSCOPY;  Surgeon: Atilio Hayes MD;                Location: [facility];  Service: Endoscopy;  Laterality:                N/A;  1/20/2020: COLONOSCOPY; N/A      Comment:  Procedure: COLONOSCOPY;  Surgeon: Atilio Hayes MD;                Location: [facility];  Service: Endoscopy;  Laterality:                N/A;  8/1/2023: FINGER MASS EXCISION; Left      Comment:  Procedure: Left middle finger mucoid cyst excision;                 Surgeon: Atilio Alaniz MD;  Location: [facility] OR;                 Service: Orthopedics;  Laterality: Left;  1/2/2024: FINGER MASS EXCISION; Left      Comment:  Procedure: Left middle finger mucoid cyst excision;                 Surgeon: Atilio Alaniz MD;  Location: [facility] OR;                 Service: Orthopedics;  Laterality: Left;  12/11/2018: FUSION OF LUMBAR SPINE USING POSTERIOR INTERBODY   TECHNIQUE; N/A      Comment:  Procedure: FUSION, SPINE, LUMBAR, PLIF L1-2, L2-3, L3-4,               L4-5, L5-S1;  Surgeon: Vik Cox MD;  Location:                [facility];  Service: Orthopedics;  Laterality: N/A;  No date: GASTRIC  SLEEVE  No date: HAND SURGERY      Comment:  right dupuytrens release on 8/5/13 2011: LUMBAR LAMINECTOMY  12/11/2018: LUMBAR LAMINECTOMY; N/A      Comment:  Procedure: LAMINECTOMY, SPINE, LUMBAR L1, L2, L3 with                 L4-L5 Re-exploration;  Surgeon: Vik Cox MD;                 Location: [facility];  Service: Orthopedics;  Laterality:                N/A;  2011: melanoma removal  12/11/2018: SPINE SURGERY      Comment:  Dr Cox L1 - S1. lamanectomy/fusion  No date: WISDOM TOOTH EXTRACTION    Current Outpatient Medications on File Prior to Visit:  ascorbic acid, vitamin C, (VITAMIN C) 500 MG tablet, Take 500 mg by mouth once daily., Disp: , Rfl:   calcium-vitamin D3 (OS-MAGGIE 500 + D3) 500 mg-5 mcg (200 unit) per tablet, Take 1 tablet by mouth Daily., Disp: , Rfl:   COQ10, LIPOSOMAL UBIQUINOL, ORAL, Take 1 capsule by mouth once daily., Disp: , Rfl:   cyanocobalamin (VITAMIN B-12) 1000 MCG tablet, Take 100 mcg by mouth once daily., Disp: , Rfl:   glucosamine-chondroitin 500-400 mg tablet, Take 1 tablet by mouth 3 (three) times daily., Disp: , Rfl:   KRILL OIL ORAL, Take 1 capsule by mouth once daily., Disp: , Rfl:   lisinopriL (PRINIVIL,ZESTRIL) 40 MG tablet, Take 1 tablet (40 mg total) by mouth once daily., Disp: 90 tablet, Rfl: 3  multivitamin (THERAGRAN) per tablet, Take 1 tablet by mouth once daily., Disp: , Rfl:   VITAMIN K2 ORAL, Take by mouth., Disp: , Rfl:     No current facility-administered medications on file prior to visit.          Review of Systems   Skin:  Positive for color change and wound.       Objective:      Physical Exam  Skin:               Assessment:       1. Skin lesion of right arm        Plan:       1. Skin lesion of right arm  Can not get into Dermatology for over a month.  We will consult surgery for removal and biopsy if needed  - Ambulatory referral/consult to General Surgery; Future

## 2024-09-17 ENCOUNTER — OFFICE VISIT (OUTPATIENT)
Dept: SURGERY | Facility: CLINIC | Age: 69
End: 2024-09-17
Attending: FAMILY MEDICINE
Payer: MEDICARE

## 2024-09-17 VITALS — SYSTOLIC BLOOD PRESSURE: 167 MMHG | HEART RATE: 56 BPM | TEMPERATURE: 97 F | DIASTOLIC BLOOD PRESSURE: 86 MMHG

## 2024-09-17 DIAGNOSIS — L98.9 SKIN LESION OF RIGHT ARM: Primary | ICD-10-CM

## 2024-09-17 PROCEDURE — 99999 PR PBB SHADOW E&M-EST. PATIENT-LVL III: CPT | Mod: PBBFAC,HCNC,, | Performed by: SURGERY

## 2024-09-17 NOTE — PROGRESS NOTES
Subjective:       Patient ID: Mal Larkin is a 69 y.o. male.    Chief Complaint: Lesion      HPI 69-year-old male presents with a skin lesion of the right posterior forearm.  This has the appearance of a keratoacanthoma.  It has been enlarging rapidly.  He was referred by his primary care physician for excision.  He would like to have this excised.    Past Medical History:   Diagnosis Date    Adenomatous colon polyp     + dysplasia    Colon polyp     Fatty liver     Melanoma 2011    in situ - upper back    Primary hypertension 6/19/2012     Past Surgical History:   Procedure Laterality Date    APPENDECTOMY      BACK SURGERY      transverse process fracture    COLONOSCOPY  11/11/2013    Dr. Hayes, 3 year recheck    COLONOSCOPY N/A 12/20/2016    Procedure: COLONOSCOPY;  Surgeon: Atilio Hayes MD;  Location: [facility];  Service: Endoscopy;  Laterality: N/A;    COLONOSCOPY N/A 1/20/2020    Procedure: COLONOSCOPY;  Surgeon: Atilio Hayes MD;  Location: [facility];  Service: Endoscopy;  Laterality: N/A;    FINGER MASS EXCISION Left 8/1/2023    Procedure: Left middle finger mucoid cyst excision;  Surgeon: Atilio Alaniz MD;  Location: [facility] OR;  Service: Orthopedics;  Laterality: Left;    FINGER MASS EXCISION Left 1/2/2024    Procedure: Left middle finger mucoid cyst excision;  Surgeon: Atilio Alaniz MD;  Location: [facility] OR;  Service: Orthopedics;  Laterality: Left;    FUSION OF LUMBAR SPINE USING POSTERIOR INTERBODY TECHNIQUE N/A 12/11/2018    Procedure: FUSION, SPINE, LUMBAR, PLIF L1-2, L2-3, L3-4, L4-5, L5-S1;  Surgeon: Vik Cox MD;  Location: [facility];  Service: Orthopedics;  Laterality: N/A;    GASTRIC SLEEVE      HAND SURGERY      right dupuytrens release on 8/5/13    LUMBAR LAMINECTOMY  2011    LUMBAR LAMINECTOMY N/A 12/11/2018    Procedure: LAMINECTOMY, SPINE, LUMBAR L1, L2, L3 with  L4-L5 Re-exploration;  Surgeon: Vik Cox MD;  Location: [facility] OR;  Service: Orthopedics;   Laterality: N/A;    melanoma removal  2011    SPINE SURGERY  12/11/2018    Dr Cox L1 - S1. lamanectomy/fusion    WISDOM TOOTH EXTRACTION           Current Outpatient Medications:     ascorbic acid, vitamin C, (VITAMIN C) 500 MG tablet, Take 500 mg by mouth once daily., Disp: , Rfl:     calcium-vitamin D3 (OS-MAGGIE 500 + D3) 500 mg-5 mcg (200 unit) per tablet, Take 1 tablet by mouth Daily., Disp: , Rfl:     COQ10, LIPOSOMAL UBIQUINOL, ORAL, Take 1 capsule by mouth once daily., Disp: , Rfl:     cyanocobalamin (VITAMIN B-12) 1000 MCG tablet, Take 100 mcg by mouth once daily., Disp: , Rfl:     glucosamine-chondroitin 500-400 mg tablet, Take 1 tablet by mouth 3 (three) times daily., Disp: , Rfl:     KRILL OIL ORAL, Take 1 capsule by mouth once daily., Disp: , Rfl:     lisinopriL (PRINIVIL,ZESTRIL) 40 MG tablet, Take 1 tablet (40 mg total) by mouth once daily., Disp: 90 tablet, Rfl: 3    multivitamin (THERAGRAN) per tablet, Take 1 tablet by mouth once daily., Disp: , Rfl:     VITAMIN K2 ORAL, Take by mouth., Disp: , Rfl:     Review of patient's allergies indicates:  No Known Allergies    Family History   Problem Relation Name Age of Onset    Skin cancer Mother      Ovarian cancer Mother      Heart disease Mother      Alzheimer's disease Mother      Prostate cancer Father      Colon cancer Father      Cancer Father          glands    Melanoma Father      Alzheimer's disease Father      Melanoma Sister      Cancer Sister          cervix, abd     Melanoma Sister      Skin cancer Sister      Vaginal cancer Sister      Asthma Daughter      Fibromyalgia Daughter      Heart disease Maternal Uncle      Alcohol abuse Maternal Uncle      Cancer Paternal Aunt      Cerebral aneurysm Maternal Grandmother      Early death Maternal Grandmother          fall hit head    Heart disease Maternal Grandfather      Cancer Paternal Grandmother      Pancreatic cancer Paternal Grandmother      Macular degeneration Neg Hx      Retinal detachment  Neg Hx      Glaucoma Neg Hx      Psoriasis Neg Hx      Lupus Neg Hx      Eczema Neg Hx       Social History     Socioeconomic History    Marital status:    Occupational History    Occupation:      Employer: [employer]   Tobacco Use    Smoking status: Former     Current packs/day: 0.00     Average packs/day: 1 pack/day for 30.0 years (30.0 ttl pk-yrs)     Types: Cigarettes     Start date: 5/8/1984     Quit date: 5/8/2014     Years since quitting: 10.3    Smokeless tobacco: Former     Types: Chew     Quit date: 9/22/1979   Substance and Sexual Activity    Alcohol use: Yes     Alcohol/week: 10.0 standard drinks of alcohol     Types: 12 Standard drinks or equivalent per week     Comment: Drinks beer only and mostly during football season    Drug use: No     Social Determinants of Health     Financial Resource Strain: Patient Declined (6/29/2024)    Overall Financial Resource Strain (CARDIA)     Difficulty of Paying Living Expenses: Patient declined   Food Insecurity: Patient Declined (6/29/2024)    Hunger Vital Sign     Worried About Running Out of Food in the Last Year: Patient declined     Ran Out of Food in the Last Year: Patient declined   Transportation Needs: No Transportation Needs (4/25/2024)    PRAPARE - Transportation     Lack of Transportation (Medical): No     Lack of Transportation (Non-Medical): No   Physical Activity: Patient Declined (6/29/2024)    Exercise Vital Sign     Days of Exercise per Week: Patient declined     Minutes of Exercise per Session: Patient declined   Stress: Patient Declined (6/29/2024)    [employer] [employer] - Occupational Stress Questionnaire     Feeling of Stress : Patient declined   Housing Stability: Unknown (6/29/2024)    Housing Stability Vital Sign     Unable to Pay for Housing in the Last Year: Patient declined       Review of Systems   Respiratory: Negative.     Cardiovascular: Negative.    Gastrointestinal: Negative.       Objective:     Physical Exam  Skin:     Comments: 3 cm papular skin lesion.  No evidence of acute infection.  There is no drainage.  It is slightly tender.  It is mobile.       Assessment:     Encounter Diagnosis   Name Primary?    Skin lesion of right arm Yes             Plan:      Right upper extremity skin lesion.  Plan excision in the office.        Procedure note:    Procedure: Excision of 3 cm skin lesion of the right upper extremity.  Procedure detail:  Area was prepped and draped in the usual sterile fashion.  Verbal consent was obtained and risks and benefits discussed.  1% lidocaine was injected for local anesthetic.  Elliptical incision was made around the lesion and it was removed in its entirety and sent to pathology.  Skin edges were then reapproximated with interrupted 4-0 nylon suture.  There was some moderate tension on the incision but it came together well.    Patient tolerated the procedure without complication.    Blood loss was minimal.    Patient will follow up in 2 weeks for pathology results and suture removal.
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
## 2024-10-01 ENCOUNTER — OFFICE VISIT (OUTPATIENT)
Dept: SURGERY | Facility: CLINIC | Age: 69
End: 2024-10-01
Payer: MEDICARE

## 2024-10-01 VITALS — SYSTOLIC BLOOD PRESSURE: 172 MMHG | TEMPERATURE: 98 F | DIASTOLIC BLOOD PRESSURE: 89 MMHG | HEART RATE: 61 BPM

## 2024-10-01 DIAGNOSIS — C44.622 SQUAMOUS CELL CANCER OF SKIN OF FOREARM, RIGHT: Primary | ICD-10-CM

## 2024-10-01 DIAGNOSIS — Z98.890 POST-OPERATIVE STATE: ICD-10-CM

## 2024-10-01 PROCEDURE — 4010F ACE/ARB THERAPY RXD/TAKEN: CPT | Mod: HCNC,CPTII,S$GLB, | Performed by: SURGERY

## 2024-10-01 PROCEDURE — 3077F SYST BP >= 140 MM HG: CPT | Mod: HCNC,CPTII,S$GLB, | Performed by: SURGERY

## 2024-10-01 PROCEDURE — 99999 PR PBB SHADOW E&M-EST. PATIENT-LVL III: CPT | Mod: PBBFAC,HCNC,, | Performed by: SURGERY

## 2024-10-01 PROCEDURE — 99024 POSTOP FOLLOW-UP VISIT: CPT | Mod: HCNC,S$GLB,, | Performed by: SURGERY

## 2024-10-01 PROCEDURE — 1160F RVW MEDS BY RX/DR IN RCRD: CPT | Mod: HCNC,CPTII,S$GLB, | Performed by: SURGERY

## 2024-10-01 PROCEDURE — 1159F MED LIST DOCD IN RCRD: CPT | Mod: HCNC,CPTII,S$GLB, | Performed by: SURGERY

## 2024-10-01 PROCEDURE — 1126F AMNT PAIN NOTED NONE PRSNT: CPT | Mod: HCNC,CPTII,S$GLB, | Performed by: SURGERY

## 2024-10-01 PROCEDURE — 3079F DIAST BP 80-89 MM HG: CPT | Mod: HCNC,CPTII,S$GLB, | Performed by: SURGERY

## 2024-10-01 PROCEDURE — 1157F ADVNC CARE PLAN IN RCRD: CPT | Mod: HCNC,CPTII,S$GLB, | Performed by: SURGERY

## 2024-10-02 NOTE — PROGRESS NOTES
POST-OP NOTE    PROCEDURE:  Patient had excision of right forearm mass.    COMPLAINTS: None.    EXAM: Incision well approximated. No drainage. No infection.          IMPRESSION:  Pathology revealed squamous cell carcinoma with a positive peripheral margin.    PLAN:  We will re-excise in the office.

## 2024-10-07 ENCOUNTER — TELEPHONE (OUTPATIENT)
Dept: GASTROENTEROLOGY | Facility: CLINIC | Age: 69
End: 2024-10-07
Payer: MEDICARE

## 2024-10-07 DIAGNOSIS — Z86.0100 HISTORY OF COLON POLYPS: Primary | ICD-10-CM

## 2024-10-07 NOTE — TELEPHONE ENCOUNTER
----- Message from Pharmacist Emerald sent at 10/7/2024  2:50 PM CDT -----  Regarding: Patient requesting appointment  Good afternoon Dr. Hayes,    Patient is requesting that he be contacted to schedule a follow up appointment. He can be contacted at [phone] or his wife's cell [phone].    Thank you,  Emerald Yan, PharmD  Digital Medicine Clinical Pharmacist

## 2024-10-15 ENCOUNTER — OFFICE VISIT (OUTPATIENT)
Dept: SURGERY | Facility: CLINIC | Age: 69
End: 2024-10-15
Payer: MEDICARE

## 2024-10-15 VITALS — BODY MASS INDEX: 31.37 KG/M2 | HEIGHT: 68 IN | WEIGHT: 207 LBS | HEART RATE: 61 BPM | RESPIRATION RATE: 16 BRPM

## 2024-10-15 DIAGNOSIS — Z98.890 POST-OPERATIVE STATE: Primary | ICD-10-CM

## 2024-10-15 PROCEDURE — 1160F RVW MEDS BY RX/DR IN RCRD: CPT | Mod: HCNC,CPTII,S$GLB, | Performed by: SURGERY

## 2024-10-15 PROCEDURE — 99999 PR PBB SHADOW E&M-EST. PATIENT-LVL III: CPT | Mod: PBBFAC,HCNC,, | Performed by: SURGERY

## 2024-10-15 PROCEDURE — 99024 POSTOP FOLLOW-UP VISIT: CPT | Mod: HCNC,S$GLB,, | Performed by: SURGERY

## 2024-10-15 PROCEDURE — 1157F ADVNC CARE PLAN IN RCRD: CPT | Mod: HCNC,CPTII,S$GLB, | Performed by: SURGERY

## 2024-10-15 PROCEDURE — 1159F MED LIST DOCD IN RCRD: CPT | Mod: HCNC,CPTII,S$GLB, | Performed by: SURGERY

## 2024-10-15 PROCEDURE — 4010F ACE/ARB THERAPY RXD/TAKEN: CPT | Mod: HCNC,CPTII,S$GLB, | Performed by: SURGERY

## 2024-10-15 PROCEDURE — 1126F AMNT PAIN NOTED NONE PRSNT: CPT | Mod: HCNC,CPTII,S$GLB, | Performed by: SURGERY

## 2024-10-15 NOTE — PROGRESS NOTES
POST-OP NOTE    PROCEDURE:  Excision of right arm lesion    COMPLAINTS: None.    EXAM: Incision well approximated. No drainage. No infection.      IMPRESSION:  Doing well.  Pathology reveals small residual squamous cell carcinoma with clear margins.    PLAN: FU as needed.  Sutures removed.

## 2024-11-12 ENCOUNTER — OFFICE VISIT (OUTPATIENT)
Dept: DERMATOLOGY | Facility: CLINIC | Age: 69
End: 2024-11-12
Payer: MEDICARE

## 2024-11-12 VITALS — WEIGHT: 207 LBS | BODY MASS INDEX: 31.37 KG/M2 | HEIGHT: 68 IN

## 2024-11-12 DIAGNOSIS — L82.0 INFLAMED SEBORRHEIC KERATOSIS: ICD-10-CM

## 2024-11-12 DIAGNOSIS — L82.1 SEBORRHEIC KERATOSES: ICD-10-CM

## 2024-11-12 DIAGNOSIS — D18.01 CHERRY ANGIOMA: ICD-10-CM

## 2024-11-12 DIAGNOSIS — Z08 ENCOUNTER FOR FOLLOW-UP SURVEILLANCE OF SKIN CANCER: ICD-10-CM

## 2024-11-12 DIAGNOSIS — D22.9 MULTIPLE BENIGN NEVI: ICD-10-CM

## 2024-11-12 DIAGNOSIS — Z85.828 ENCOUNTER FOR FOLLOW-UP SURVEILLANCE OF SKIN CANCER: ICD-10-CM

## 2024-11-12 DIAGNOSIS — Z85.820 HISTORY OF MALIGNANT MELANOMA OF SKIN: ICD-10-CM

## 2024-11-12 DIAGNOSIS — D48.5 NEOPLASM OF UNCERTAIN BEHAVIOR OF SKIN: Primary | ICD-10-CM

## 2024-11-12 PROCEDURE — 1101F PT FALLS ASSESS-DOCD LE1/YR: CPT | Mod: CPTII,S$GLB,, | Performed by: DERMATOLOGY

## 2024-11-12 PROCEDURE — 4010F ACE/ARB THERAPY RXD/TAKEN: CPT | Mod: CPTII,S$GLB,, | Performed by: DERMATOLOGY

## 2024-11-12 PROCEDURE — 1157F ADVNC CARE PLAN IN RCRD: CPT | Mod: CPTII,S$GLB,, | Performed by: DERMATOLOGY

## 2024-11-12 PROCEDURE — 1159F MED LIST DOCD IN RCRD: CPT | Mod: CPTII,S$GLB,, | Performed by: DERMATOLOGY

## 2024-11-12 PROCEDURE — 1160F RVW MEDS BY RX/DR IN RCRD: CPT | Mod: CPTII,S$GLB,, | Performed by: DERMATOLOGY

## 2024-11-12 PROCEDURE — 3008F BODY MASS INDEX DOCD: CPT | Mod: CPTII,S$GLB,, | Performed by: DERMATOLOGY

## 2024-11-12 PROCEDURE — 88305 TISSUE EXAM BY PATHOLOGIST: CPT | Mod: HCNC | Performed by: DERMATOLOGY

## 2024-11-12 PROCEDURE — 3288F FALL RISK ASSESSMENT DOCD: CPT | Mod: CPTII,S$GLB,, | Performed by: DERMATOLOGY

## 2024-11-12 PROCEDURE — 17110 DESTRUCTION B9 LES UP TO 14: CPT | Mod: S$GLB,,, | Performed by: DERMATOLOGY

## 2024-11-12 PROCEDURE — 11102 TANGNTL BX SKIN SINGLE LES: CPT | Mod: XS,S$GLB,, | Performed by: DERMATOLOGY

## 2024-11-12 PROCEDURE — 99213 OFFICE O/P EST LOW 20 MIN: CPT | Mod: 25,S$GLB,, | Performed by: DERMATOLOGY

## 2024-11-12 NOTE — PROGRESS NOTES
Mal Larkin is a 69 y.o. male patient.   No diagnosis found.  Past Medical History:   Diagnosis Date    Adenomatous colon polyp     + dysplasia    Colon polyp     Fatty liver     Melanoma 2011    in situ - upper back    Primary hypertension 6/19/2012     Past Surgical History Pertinent Negatives:   Procedure Date Noted    GASTRIC BYPASS 11/30/2018     Scheduled Meds:  Continuous Infusions:  PRN Meds:    Review of patient's allergies indicates:  No Known Allergies  There are no hospital problems to display for this patient.    There were no vitals taken for this visit.    Subjective  Objective  Assessment & Plan    Salina Mancera MA  11/12/2024

## 2024-11-12 NOTE — PROGRESS NOTES
Subjective:      Patient ID:  Mal Larkin is a 69 y.o. male who presents for   Chief Complaint   Patient presents with    Skin Check     TBSC      LOV 7/2/24 NUB, AK, Hx of MM of skin, SK       1. Skin, left temple,  shave biopsy:   - BASAL CELL CARCINOMA, NODULAR TYPE   - The deep and peripheral tissue edges are uninvolved by tumor.      Patient here today for TBSC  Recheck bx site left temple.  C/O spot to right posterior scalp x couple of weeks.     Derm hx  SCC (KA type per picture) right forearm- 10/1/24 biopsied and excised by    BCC left temple- 7/2/2024 , monitoring  H/o AKs treated with cryo  H/o MIS upper back 2011  + FH melanoma in father and both sisters     Rides bike 15 miles per day, uses sun protection. Plays a lot of golf.     Current Outpatient Medications:   ·  ascorbic acid, vitamin C, (VITAMIN C) 500 MG tablet, Take 500 mg by mouth once daily., Disp: , Rfl:   ·  calcium-vitamin D3 (OS-MAGGIE 500 + D3) 500 mg-5 mcg (200 unit) per tablet, Take 1 tablet by mouth Daily., Disp: , Rfl:   ·  COQ10, LIPOSOMAL UBIQUINOL, ORAL, Take 1 capsule by mouth once daily., Disp: , Rfl:   ·  cyanocobalamin (VITAMIN B-12) 1000 MCG tablet, Take 100 mcg by mouth once daily., Disp: , Rfl:   ·  glucosamine-chondroitin 500-400 mg tablet, Take 1 tablet by mouth 3 (three) times daily., Disp: , Rfl:   ·  KRILL OIL ORAL, Take 1 capsule by mouth once daily., Disp: , Rfl:   ·  lisinopriL (PRINIVIL,ZESTRIL) 40 MG tablet, Take 1 tablet (40 mg total) by mouth once daily., Disp: 90 tablet, Rfl: 3  ·  multivitamin (THERAGRAN) per tablet, Take 1 tablet by mouth once daily., Disp: , Rfl:   ·  VITAMIN K2 ORAL, Take by mouth., Disp: , Rfl:           Review of Systems   Constitutional:  Negative for fever, chills, weight gain, fatigue, night sweats and malaise. Weight loss: bariatric surgery 5/2018 lost 135lbs.  HENT:  Negative for headaches.    Respiratory:  Negative for cough and shortness of breath.     Gastrointestinal:  Negative for indigestion.   Skin:  Positive for activity-related sunscreen use and wears hat. Negative for daily sunscreen use and recent sunburn.   Neurological:  Negative for headaches.   Hematologic/Lymphatic: Negative for adenopathy. Bruises/bleeds easily.       Objective:   Physical Exam   Constitutional: He appears well-developed and well-nourished. No distress.   Neurological: He is alert and oriented to person, place, and time. He is not disoriented.   Psychiatric: He has a normal mood and affect.   Skin:   Areas Examined (abnormalities noted in diagram):   Scalp / Hair Palpated and Inspected  Head / Face Inspection Performed  Neck Inspection Performed  Chest / Axilla Inspection Performed  Abdomen Inspection Performed  Genitals / Buttocks / Groin Inspection Performed  Back Inspection Performed  RUE Inspected  LUE Inspection Performed  RLE Inspected  LLE Inspection Performed  Nails and Digits Inspection Performed                     Diagram Legend     Erythematous scaling macule/papule c/w actinic keratosis       Vascular papule c/w angioma      Pigmented verrucoid papule/plaque c/w seborrheic keratosis      Yellow umbilicated papule c/w sebaceous hyperplasia      Irregularly shaped tan macule c/w lentigo     1-2 mm smooth white papules consistent with Milia      Movable subcutaneous cyst with punctum c/w epidermal inclusion cyst      Subcutaneous movable cyst c/w pilar cyst      Firm pink to brown papule c/w dermatofibroma      Pedunculated fleshy papule(s) c/w skin tag(s)      Evenly pigmented macule c/w junctional nevus     Mildly variegated pigmented, slightly irregular-bordered macule c/w mildly atypical nevus      Flesh colored to evenly pigmented papule c/w intradermal nevus       Pink pearly papule/plaque c/w basal cell carcinoma      Erythematous hyperkeratotic cursted plaque c/w SCC      Surgical scar with no sign of skin cancer recurrence      Open and closed comedones       Inflammatory papules and pustules      Verrucoid papule consistent consistent with wart     Erythematous eczematous patches and plaques     Dystrophic onycholytic nail with subungual debris c/w onychomycosis     Umbilicated papule    Erythematous-base heme-crusted tan verrucoid plaque consistent with inflamed seborrheic keratosis     Erythematous Silvery Scaling Plaque c/w Psoriasis     See annotation      Assessment / Plan:      Pathology Orders:       Normal Orders This Visit    Specimen to Pathology, Dermatology     Questions:    Procedure Type: Dermatology and skin neoplasms    Number of Specimens: 1    ------------------------: -------------------------    Spec 1 Procedure: Biopsy    Spec 1 Clinical Impression: SCC/KA vs other    Spec 1 Source: R ppost auricular scalp    Release to patient:           Neoplasm of uncertain behavior of skin  -     Specimen to Pathology, Dermatology  Shave biopsy procedure note:    Shave biopsy performed after verbal consent including risk of infection, scar, recurrence, need for additional treatment of site. Area prepped with alcohol, anesthetized with approximately 1.0cc of 1% lidocaine with epinephrine. Lesional tissue shaved with razor blade. Hemostasis achieved with application of aluminum chloride followed by hyfrecation. No complications. Dressing applied. Wound care explained.    ISK/warty keratoses  Verbal consent obtained. 2 lesion(s) destroyed with electrodesiccation after anesthesia with 1% lidocaine with epinephrine. No complications.     Seborrheic keratoses  These are benign inherited growths without a malignant potential. Reassurance given to patient. No treatment is necessary.     Multiple benign nevi  Careful dermoscopy evaluation of nevi performed with none identified as needing biopsy today  Monitor for new mole or moles that are becoming bigger, darker, irritated, or developing irregular borders.     Encounter for follow-up surveillance of skin cancer  History  of malignant melanoma of skin  Area of previous melanoma and SCC examined. Site well healed with no signs of recurrence.    Total body skin examination performed today including at least 12 points as noted in physical examination. No lesions suspicious for malignancy noted.    Cherry angioma  This is a benign vascular lesion. Reassurance given. No treatment required.     Patient instructed in importance in daily broad spectrum sun protection of at least spf 30. Mineral sunscreen ingredients preferred (Zinc +/- Titanium) and can be found OTC.   Patient encouraged to wear hat for all outdoor exposure.   Also discussed sun avoidance and use of protective clothing.               No follow-ups on file.

## 2024-11-15 ENCOUNTER — TELEPHONE (OUTPATIENT)
Facility: CLINIC | Age: 69
End: 2024-11-15
Payer: MEDICARE

## 2024-11-15 ENCOUNTER — TELEPHONE (OUTPATIENT)
Dept: DERMATOLOGY | Facility: CLINIC | Age: 69
End: 2024-11-15
Payer: MEDICARE

## 2024-11-15 DIAGNOSIS — C44.42 SCC (SQUAMOUS CELL CARCINOMA), SCALP/NECK: Primary | ICD-10-CM

## 2024-11-15 LAB
FINAL PATHOLOGIC DIAGNOSIS: NORMAL
GROSS: NORMAL
Lab: NORMAL
MICROSCOPIC EXAM: NORMAL

## 2024-11-15 NOTE — TELEPHONE ENCOUNTER
----- Message from ALLA Butler sent at 11/15/2024  2:13 PM CST -----  Spoke to pt. Informed of biopsy result. Verbalized understanding. Referral sent to Dr Junior's clinic for further tx of the SCC of the right posterior auricular scalp per pt agreement.  Please follow up with the pt.  Thank you

## 2024-11-19 ENCOUNTER — PATIENT MESSAGE (OUTPATIENT)
Dept: ADMINISTRATIVE | Facility: HOSPITAL | Age: 69
End: 2024-11-19
Payer: MEDICARE

## 2024-11-26 ENCOUNTER — PROCEDURE VISIT (OUTPATIENT)
Dept: DERMATOLOGY | Facility: CLINIC | Age: 69
End: 2024-11-26
Payer: MEDICARE

## 2024-11-26 VITALS
HEART RATE: 66 BPM | SYSTOLIC BLOOD PRESSURE: 172 MMHG | BODY MASS INDEX: 31.71 KG/M2 | DIASTOLIC BLOOD PRESSURE: 87 MMHG | WEIGHT: 202 LBS | HEIGHT: 67 IN

## 2024-11-26 DIAGNOSIS — C44.42 SCC (SQUAMOUS CELL CARCINOMA), SCALP/NECK: ICD-10-CM

## 2024-11-26 PROCEDURE — 13132 CMPLX RPR F/C/C/M/N/AX/G/H/F: CPT | Mod: HCNC,S$GLB,, | Performed by: DERMATOLOGY

## 2024-11-26 PROCEDURE — 17312 MOHS ADDL STAGE: CPT | Mod: HCNC,S$GLB,, | Performed by: DERMATOLOGY

## 2024-11-26 PROCEDURE — 17311 MOHS 1 STAGE H/N/HF/G: CPT | Mod: HCNC,51,S$GLB, | Performed by: DERMATOLOGY

## 2024-11-26 NOTE — PROGRESS NOTES
MOHS MICROGRAPHIC SURGERY OPERATIVE NOTE  Name:  Mal Larkin  Date: 2024  Patient : 1955  Attending Surgeon: Arik Junior MD  Assistants: Vilma Thomas - Surgical Technician  Anesthetic Agent: 1% lidocaine with 1:100,000 epinephrine  Clinical Diagnosis: squamous cell carcinoma - well differentiated  Operation: Mohs Micrographic Surgery  Location: right posterior auricular scalp  Indications: Biopsy-proven skin cancer of cosmetically and functionally important areas, including head, neck, genital, hand, foot, or areas known for having difficulty in healing, such as the lower anterior legs.  Surgical Preparation: povidone-iodine  Pre-op anbitioics: no     Description of Operation:  The nature and purpose of the procedure, associated risks and alternative treatments were explained to the patient in detail. All patient questions were answered completely. An informed operative consent and photography permit were obtained. The tumor location was then identified and marked with agreement by the patient of the correct location. The patient was positioned, prepped, and draped in the usual sterile manner. Local anesthesia was obtained with 4 cc(s) of 1% lidocaine with 1:100,000 epinephrine. The lesion pre-operatively measures 1.0 by 1.0 cm.     1ST STAGE:  A 2+ mm rim of normal appearing skin was marked circumferentially around the lesion after scraping with a curette to define the margin. The area thus outlined was excised at a 45 degree angle. Hemostasis was obtained with electrodesiccation. The specimen was oriented, mapped, and subdivided into at least two sections. Sections were then chromacoded and submitted for horizontal frozen sections. The patient tolerated the procedure well and there were no complications. Upon microscopic examination of the processed horizontal frozen sections of this stage:  Tumor was present at the margin of the specimen; these areas of residual tumor were  marked on the reference map with red pencil, pinpointing the location in which further tissue excision was necessary.    Tumor type noted on stage 1: Squamous cell carcinoma in situ: Epidermis with full-thickness atypia and variable epidermal maturation.     SUBSEQUENT STAGES: The patient returned to the operating room for additional stages of tumor removal, and prepped in the manner described above. Surgery was directed to the areas having residual tumor, with thin layers of tissue being excised from these regions. A new reference map was prepared during the surgery to maintain precise orientation as described above. Hemostasis was achieved and the excised tissue was processed for microscopic analysis. These sections were then examined by the Mohs surgeon, and areas of persistent tumor were indicated on the reference map. This process was repeated until the frozen horizontal sections of STAGE 2 revealed no further tumor cells and tumor eradication was considered to be complete.  Tumor type noted on subsequent stages: No tumor seen.       SUMMARY:  The tumor was extirpated in 2 Mohs Stages resulting in a final defect measuring 1.9 by 1.9 cm².   The final defect extended deep to subcutaneous fat  The patient tolerated the procedure well and no complications were noted.     DEFECT PHOTO:      MD BEAN Castellanos    Complex Linear Closure Operative Note  Name: Mal Larkin  YOB: 1955  Date: 11/26/2024  Attending Surgeon: MD BEAN Castellanos  Assistants:  Vilma Thomas - Surgical Technician  Clinical Diagnosis: A 1.9 by 1.9 cm defect secondary to Mohs Micrographic Surgery  Location: right auricular scalp  Operation: Complex linear closure  Surgical Preparation: broad scrub with povidone-iodine    Description of Operation:  The nature and purpose of the procedure, associated risks and alternative treatments were explained to the patient in detail. All patient questions were answered  completely. In order to minimize tension on the closure and avoid compromising the anatomic contour of the cosmetic region and maximize functional capacity, a complex linear closure was performed. An informed operative consent and photography permit were obtained. The patient was positioned, prepped, and draped in the usual sterile manner. Local anesthesia was obtained with 6 cc(s) of 1% lidocaine with 1:100,000 epinephrine.    The defect edges were debeveled with a scalpel blade. The circular defect was widely undermined in the deep subcutaneous plane at least 100% of the defect diameter to allow for maximum tissue movement. Hemostasis was achieved with spot electrodessication. The defect was closed first utilizing multiple 3-0 Vicryl interrupted buried subcutaneous sutures. This resulted in excellent apposition of the dermis and epidermis, however two redundant areas of tissue were created on opposite sides of the defect. Utilizing a #15 scalpel blade, two Burows triangles were excised to ensure a flat scar. Hemostasis was obtained with spot electrodessication. The skin edges throughout further fastened superficially with 4-0 Nylon. The final length of the repair was 4.6 cm. The patient tolerated the procedure well and there were no complications.    Polysporin, non-adherent gauze and a pressure dressing were applied to wound after gentle cleansing with saline.    Patient instructed in and provided with instructions for post-op care, will RTC in 10 days for suture removal    Post op meds: None    Photos:        Arik Junior MD

## 2024-12-06 ENCOUNTER — CLINICAL SUPPORT (OUTPATIENT)
Dept: DERMATOLOGY | Facility: CLINIC | Age: 69
End: 2024-12-06
Payer: MEDICARE

## 2024-12-06 DIAGNOSIS — Z48.02 VISIT FOR SUTURE REMOVAL: Primary | ICD-10-CM

## 2024-12-06 PROCEDURE — 99999 PR PBB SHADOW E&M-EST. PATIENT-LVL I: CPT | Mod: PBBFAC,HCNC,,

## 2024-12-06 NOTE — PROGRESS NOTES
Mohs Surgery Suture Removal Note   Patient Name: Mal Larkin  Patient : 1955  Date of Service: 2024    CC: Pt. Presents for removal of sutures on the scalp today  Subjective: patient reports wound healing as expected     Objective: Wound well healed, sutures clean/dry/intact. No evidence of any complications noted.     Visit For Suture Removal:  - Suture removal today  - Steri strips/mastisol were not applied  - Patient counseled on silicone gel/silicone sheeting and their use in the management of post-operative scars  - Handout on silicone gel/silicone gel sheeting was provided  - Patient to follow up with their referring provider in 3 months for further skin evaluation    Rere Stearns

## 2024-12-11 ENCOUNTER — OFFICE VISIT (OUTPATIENT)
Dept: ORTHOPEDICS | Facility: CLINIC | Age: 69
End: 2024-12-11
Payer: MEDICARE

## 2024-12-11 ENCOUNTER — HOSPITAL ENCOUNTER (OUTPATIENT)
Dept: RADIOLOGY | Facility: HOSPITAL | Age: 69
Discharge: HOME | End: 2024-12-11
Attending: ORTHOPAEDIC SURGERY
Payer: MEDICARE

## 2024-12-11 VITALS — HEIGHT: 67 IN | BODY MASS INDEX: 31.7 KG/M2 | WEIGHT: 201.94 LBS

## 2024-12-11 DIAGNOSIS — M53.3 PAIN OF LEFT SACROILIAC JOINT: ICD-10-CM
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
## 2024-12-11 DIAGNOSIS — Z98.1 HISTORY OF LUMBAR SPINAL FUSION: Primary | ICD-10-CM

## 2024-12-11 PROCEDURE — 3288F FALL RISK ASSESSMENT DOCD: CPT | Mod: HCNC,CPTII,S$GLB, | Performed by: ORTHOPAEDIC SURGERY

## 2024-12-11 PROCEDURE — 72170 X-RAY EXAM OF PELVIS: CPT | Mod: 26,HCNC,, | Performed by: RADIOLOGY

## 2024-12-11 PROCEDURE — 72100 X-RAY EXAM L-S SPINE 2/3 VWS: CPT | Mod: 26,HCNC,, | Performed by: RADIOLOGY

## 2024-12-11 PROCEDURE — 72170 X-RAY EXAM OF PELVIS: CPT | Mod: TC,HCNC,PN

## 2024-12-11 PROCEDURE — 99203 OFFICE O/P NEW LOW 30 MIN: CPT | Mod: HCNC,S$GLB,, | Performed by: ORTHOPAEDIC SURGERY

## 2024-12-11 PROCEDURE — 3008F BODY MASS INDEX DOCD: CPT | Mod: HCNC,CPTII,S$GLB, | Performed by: ORTHOPAEDIC SURGERY

## 2024-12-11 PROCEDURE — 1100F PTFALLS ASSESS-DOCD GE2>/YR: CPT | Mod: HCNC,CPTII,S$GLB, | Performed by: ORTHOPAEDIC SURGERY

## 2024-12-11 PROCEDURE — 4010F ACE/ARB THERAPY RXD/TAKEN: CPT | Mod: HCNC,CPTII,S$GLB, | Performed by: ORTHOPAEDIC SURGERY

## 2024-12-11 PROCEDURE — 1125F AMNT PAIN NOTED PAIN PRSNT: CPT | Mod: HCNC,CPTII,S$GLB, | Performed by: ORTHOPAEDIC SURGERY

## 2024-12-11 PROCEDURE — 99999 PR PBB SHADOW E&M-EST. PATIENT-LVL IV: CPT | Mod: PBBFAC,HCNC,, | Performed by: ORTHOPAEDIC SURGERY

## 2024-12-11 PROCEDURE — 72100 X-RAY EXAM L-S SPINE 2/3 VWS: CPT | Mod: TC,HCNC,PN

## 2024-12-11 PROCEDURE — 1160F RVW MEDS BY RX/DR IN RCRD: CPT | Mod: HCNC,CPTII,S$GLB, | Performed by: ORTHOPAEDIC SURGERY

## 2024-12-11 PROCEDURE — 1159F MED LIST DOCD IN RCRD: CPT | Mod: HCNC,CPTII,S$GLB, | Performed by: ORTHOPAEDIC SURGERY

## 2024-12-11 PROCEDURE — 1157F ADVNC CARE PLAN IN RCRD: CPT | Mod: HCNC,CPTII,S$GLB, | Performed by: ORTHOPAEDIC SURGERY

## 2024-12-11 RX ORDER — MELOXICAM 7.5 MG/1
7.5 TABLET ORAL DAILY
Qty: 60 TABLET | Refills: 2 | Status: SHIPPED | OUTPATIENT
Start: 2024-12-11
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
## 2025-01-13 ENCOUNTER — PATIENT MESSAGE (OUTPATIENT)
Dept: ADMINISTRATIVE | Facility: OTHER | Age: 70
End: 2025-01-13
Payer: MEDICARE

## 2025-01-24 ENCOUNTER — ANESTHESIA EVENT (OUTPATIENT)
Dept: ENDOSCOPY | Facility: HOSPITAL | Age: 70
End: 2025-01-24
Payer: MEDICARE

## 2025-01-24 ENCOUNTER — ANESTHESIA (OUTPATIENT)
Dept: ENDOSCOPY | Facility: HOSPITAL | Age: 70
End: 2025-01-24
Payer: MEDICARE

## 2025-01-24 ENCOUNTER — HOSPITAL ENCOUNTER (OUTPATIENT)
Facility: HOSPITAL | Age: 70
Discharge: HOME | End: 2025-01-24
Attending: INTERNAL MEDICINE | Admitting: INTERNAL MEDICINE
Payer: MEDICARE

## 2025-01-24 DIAGNOSIS — Z86.0100 HISTORY OF COLON POLYPS: ICD-10-CM

## 2025-01-24 PROCEDURE — 88305 TISSUE EXAM BY PATHOLOGIST: CPT | Mod: TC | Performed by: PATHOLOGY

## 2025-01-24 PROCEDURE — 25000003 PHARM REV CODE 250: Performed by: INTERNAL MEDICINE

## 2025-01-24 PROCEDURE — 37000009 HC ANESTHESIA EA ADD 15 MINS: Performed by: INTERNAL MEDICINE

## 2025-01-24 PROCEDURE — D9220A PRA ANESTHESIA: Mod: PT,CRNA,, | Performed by: NURSE ANESTHETIST, CERTIFIED REGISTERED

## 2025-01-24 PROCEDURE — D9220A PRA ANESTHESIA: Mod: PT,ANES,, | Performed by: ANESTHESIOLOGY

## 2025-01-24 PROCEDURE — 63600175 PHARM REV CODE 636 W HCPCS: Performed by: NURSE ANESTHETIST, CERTIFIED REGISTERED

## 2025-01-24 PROCEDURE — 45380 COLONOSCOPY AND BIOPSY: CPT | Mod: PT | Performed by: INTERNAL MEDICINE

## 2025-01-24 PROCEDURE — 37000008 HC ANESTHESIA 1ST 15 MINUTES: Performed by: INTERNAL MEDICINE

## 2025-01-24 PROCEDURE — 27201012 HC FORCEPS, HOT/COLD, DISP: Performed by: INTERNAL MEDICINE

## 2025-01-24 PROCEDURE — 45380 COLONOSCOPY AND BIOPSY: CPT | Mod: PT,,, | Performed by: INTERNAL MEDICINE

## 2025-01-24 RX ORDER — PROPOFOL 10 MG/ML
VIAL (ML) INTRAVENOUS
Status: DISCONTINUED | OUTPATIENT
Start: 2025-01-24 | End: 2025-01-24

## 2025-01-24 RX ORDER — LIDOCAINE HYDROCHLORIDE 20 MG/ML
INJECTION INTRAVENOUS
Status: DISCONTINUED | OUTPATIENT
Start: 2025-01-24 | End: 2025-01-24

## 2025-01-24 RX ORDER — SODIUM CHLORIDE 9 MG/ML
INJECTION, SOLUTION INTRAVENOUS CONTINUOUS
Status: DISCONTINUED | OUTPATIENT
Start: 2025-01-24 | End: 2025-01-24 | Stop reason: HOSPADM

## 2025-01-24 RX ORDER — DEXTROMETHORPHAN/PSEUDOEPHED 2.5-7.5/.8
DROPS ORAL
Status: COMPLETED | OUTPATIENT
Start: 2025-01-24 | End: 2025-01-24

## 2025-01-24 RX ADMIN — SODIUM CHLORIDE: 9 INJECTION, SOLUTION INTRAVENOUS at 07:01

## 2025-01-24 RX ADMIN — PROPOFOL 100 MG: 10 INJECTION, EMULSION INTRAVENOUS at 07:01

## 2025-01-24 RX ADMIN — PROPOFOL 50 MG: 10 INJECTION, EMULSION INTRAVENOUS at 07:01

## 2025-01-24 RX ADMIN — LIDOCAINE HYDROCHLORIDE 50 MG: 20 INJECTION, SOLUTION INTRAVENOUS at 07:01

## 2025-01-24 NOTE — ANESTHESIA PREPROCEDURE EVALUATION
01/24/2025  Mal Larkin is a 69 y.o., male.    Anesthesia Evaluation    I have reviewed the Patient Summary Reports.     I have reviewed the Nursing Notes. I have reviewed the NPO Status.   I have reviewed the Medications.     Review of Systems  Anesthesia Hx:  No problems with previous Anesthesia                Social:  Former Smoker, Social Alcohol Use       Cardiovascular:     Hypertension                                          Pulmonary:  Pulmonary Normal                       Hepatic/GI:  Bowel Prep.    Liver Disease,               Musculoskeletal:  Arthritis          Spine Disorders: lumbar            Neurological:  Neurology Normal                                      Endocrine:  Endocrine Normal                Physical Exam  General:  Well nourished       Airway/Jaw/Neck:  Airway Findings:             Mallampati: II                  Dental:  Dental Findings: In tact      Chest/Lungs:  Chest/Lungs Findings:  Clear to auscultation, Normal Respiratory Rate       Heart/Vascular:  Heart Findings: Rate: Normal  Rhythm: Regular Rhythm                             Anesthesia Plan  Type of Anesthesia, risks & benefits discussed:  Anesthesia Type:  general    Patient's Preference:   Plan Factors:          Intra-op Monitoring Plan: standard ASA monitors  Intra-op Monitoring Plan Comments:   Post Op Pain Control Plan: multimodal analgesia and IV/PO Opioids PRN  Post Op Pain Control Plan Comments:     Induction:   IV and Inhalation  Beta Blocker:  Patient is not currently on a Beta-Blocker (No further documentation required).       Informed Consent: Informed consent signed with the Patient and all parties understand the risks and agree with anesthesia plan.  All questions answered.  Anesthesia consent signed with patient.  ASA Score: 2     Day of Surgery Review of History & Physical: I have interviewed and  examined the patient. I have reviewed the patient's H&P dated:              Ready For Surgery From Anesthesia Perspective.             Physical Exam  General: Well nourished    Airway:  Mallampati: II     Dental:  In tact    Chest/Lungs:  Clear to auscultation, Normal Respiratory Rate    Heart:  Rate: Normal  Rhythm: Regular Rhythm        Anesthesia Plan  Type of Anesthesia, risks & benefits discussed:    Anesthesia Type: general  Intra-op Monitoring Plan: standard ASA monitors  Post Op Pain Control Plan: multimodal analgesia and IV/PO Opioids PRN  Induction:  IV and Inhalation  Informed Consent: Informed consent signed with the Patient and all parties understand the risks and agree with anesthesia plan.  All questions answered.   ASA Score: 2  Day of Surgery Review of History & Physical: I have interviewed and examined the patient. I have reviewed the patient's H&P dated:     Ready For Surgery From Anesthesia Perspective.     .

## 2025-01-24 NOTE — TRANSFER OF CARE
Anesthesia Transfer of Care Note    Patient: Mal Larkin    Procedure(s) Performed: Procedure(s) (LRB):  COLONOSCOPY, SCREENING, HIGH RISK PATIENT (N/A)    Patient location: GI    Anesthesia Type: general    Transport from OR: Transported from OR on room air with adequate spontaneous ventilation    Post pain: adequate analgesia    Post assessment: no apparent anesthetic complications    Post vital signs: stable    Level of consciousness: awake    Nausea/Vomiting: no nausea/vomiting    Complications: none    Transfer of care protocol was followed      Last vitals: Visit Vitals  BP (!) 176/86 (BP Location: Left arm, Patient Position: Lying)   Pulse (!) 57   Temp 36.7 °C (98.1 °F) (Skin)   Resp 16   SpO2 97%

## 2025-01-24 NOTE — H&P
CC: History of colon polyps    69 year old male with above. States that symptoms are absent, no alleviating/exacerbating factors. Positive family history of colorectal CA. Positive personal history of polyps. No bleeding or weight loss.     ROS:  No headache, no fever/chills, no chest pain/SOB, no nausea/vomiting/diarrhea/constipation/GI bleeding/abdominal pain, no dysuria/hematuria.    VSSAF   Exam:   Alert and oriented x 3; no apparent distress   PERRLA, sclera anicteric  CV: Regular rate/rhythm, normal PMI   Lungs: Clear bilaterally with no wheeze/rales   Abdomen: Soft, NT/ND, normal bowel sounds   Ext: No cyanosis, clubbing     Impression:   As above    Plan:   Proceed with endoscopy. Further recs to follow.

## 2025-01-24 NOTE — [employer] PATIENT INSTRUCTIONS
Discharge Summary/Instructions after an Endoscopic Procedure  Patient Name: Mal Larkin  Patient MRN: [id number]  Patient YOB: 1955 Friday, January 24, 2025  Atilio Rosenberg MD  Dear patient,  As a result of recent federal legislation (The Federal Cures Act), you may   receive lab or pathology results from your procedure in your [facility]   account before your physician is able to contact you. Your physician or   their representative will relay the results to you with their   recommendations at their soonest availability.  Thank you,  RESTRICTIONS:  During your procedure today, you received medications for sedation.  These   medications may affect your judgment, balance and coordination.  Therefore,   for 24 hours, you have the following restrictions:   - DO NOT drive a car, operate machinery, make legal/financial decisions,   sign important papers or drink alcohol.    ACTIVITY:  Today: no heavy lifting, straining or running due to procedural   sedation/anesthesia.  The following day: return to full activity including work.  DIET:  Eat and drink normally unless instructed otherwise.     TREATMENT FOR COMMON SIDE EFFECTS:  - Mild abdominal pain, nausea, belching, bloating or excessive gas:  rest,   eat lightly and use a heating pad.  - Sore Throat: treat with throat lozenges and/or gargle with warm salt   water.  - Because air was used during the procedure, expelling large amounts of air   from your rectum or belching is normal.  - If a bowel prep was taken, you may not have a bowel movement for 1-3 days.    This is normal.  SYMPTOMS TO WATCH FOR AND REPORT TO YOUR PHYSICIAN:  1. Abdominal pain or bloating, other than gas cramps.  2. Chest pain.  3. Back pain.  4. Signs of infection such as: chills or fever occurring within 24 hours   after the procedure.  5. Rectal bleeding, which would show as bright red, maroon, or black stools.   (A tablespoon of blood from the rectum is not serious, especially if    hemorrhoids are present.)  6. Vomiting.  7. Weakness or dizziness.  GO DIRECTLY TO THE NEAREST EMERGENCY ROOM IF YOU HAVE ANY OF THE FOLLOWING:      Difficulty breathing              Chills and/or fever over 101 F   Persistent vomiting and/or vomiting blood   Severe abdominal pain   Severe chest pain   Black, tarry stools   Bleeding- more than one tablespoon   Any other symptom or condition that you feel may need urgent attention  Your doctor recommends these additional instructions:  If any biopsies were taken, your doctors clinic will contact you in 1 to 2   weeks with any results.  - Patient has a contact number available for emergencies.  The signs and   symptoms of potential delayed complications were discussed with the   patient.  Return to normal activities tomorrow.  Written discharge   instructions were provided to the patient.   - High fiber diet.   - Continue present medications.   - Await pathology results.   - Repeat colonoscopy in 5 years for surveillance.   - Discharge patient to home (ambulatory).   - Return to GI office PRN.  For questions, problems or results please call your physician - Atilio Rosenberg MD at Work:  [phone].  [facility], EMERGENCY ROOM PHONE NUMBER: [phone]  IF A COMPLICATION OR EMERGENCY SITUATION ARISES AND YOU ARE UNABLE TO REACH   YOUR PHYSICIAN - GO DIRECTLY TO THE EMERGENCY ROOM.  Atilio Rosenberg MD  1/24/2025 8:13:28 AM  This report has been verified and signed electronically.  Dear patient,  As a result of recent federal legislation (The Federal Cures Act), you may   receive lab or pathology results from your procedure in your [facility]   account before your physician is able to contact you. Your physician or   their representative will relay the results to you with their   recommendations at their soonest availability.  Thank you,  [employer]ATION

## 2025-01-24 NOTE — PLAN OF CARE
Vss, denise po fluids, denies pain, ambulates easily. IV removed, catheter intact. Discharge instructions provided and states understanding. States ready to go home.  Discharged from facility with family per wheelchair.

## 2025-01-24 NOTE — ANESTHESIA POSTPROCEDURE EVALUATION
Anesthesia Post Evaluation    Patient: Mal Larkin    Procedure(s) Performed: Procedure(s) (LRB):  COLONOSCOPY, SCREENING, HIGH RISK PATIENT (N/A)    Final Anesthesia Type: general      Patient location during evaluation: PACU  Patient participation: Yes- Able to Participate  Level of consciousness: sedated and awake  Post-procedure vital signs: reviewed and stable  Pain management: adequate  Airway patency: patent    PONV status at discharge: No PONV  Anesthetic complications: no      Cardiovascular status: blood pressure returned to baseline and hemodynamically stable  Respiratory status: spontaneous ventilation  Hydration status: euvolemic  Follow-up not needed.              Vitals Value Taken Time   /68 01/24/25 0825   Temp 36.7 °C (98.1 °F) 01/24/25 0825   Pulse 54 01/24/25 0829   Resp 23 01/24/25 0828   SpO2 97 % 01/24/25 0829   Vitals shown include unfiled device data.      Event Time   Out of Recovery 08:30:51         Pain/Taylor Score: Taylor Score: 10 (1/24/2025  8:25 AM)

## 2025-01-27 VITALS
DIASTOLIC BLOOD PRESSURE: 70 MMHG | RESPIRATION RATE: 27 BRPM | SYSTOLIC BLOOD PRESSURE: 134 MMHG | TEMPERATURE: 98 F | HEART RATE: 54 BPM | OXYGEN SATURATION: 96 %

## 2025-01-29 NOTE — PROGRESS NOTES
Please advise patient that polyp pathology was reviewed and is benign and is the nonadenomatous type which is precancerous/risk factor for cancer.  Repeat colonoscopy recommended in 5 years.  Place reminder in system for repeat colonoscopy.

## 2025-02-10 ENCOUNTER — PATIENT MESSAGE (OUTPATIENT)
Dept: OTHER | Facility: OTHER | Age: 70
End: 2025-02-10
Payer: MEDICARE

## 2025-02-25 ENCOUNTER — PATIENT MESSAGE (OUTPATIENT)
Dept: ADMINISTRATIVE | Facility: OTHER | Age: 70
End: 2025-02-25
Payer: MEDICARE

## 2025-03-13 ENCOUNTER — OFFICE VISIT (OUTPATIENT)
Dept: DERMATOLOGY | Facility: CLINIC | Age: 70
End: 2025-03-13
Payer: MEDICARE

## 2025-03-13 VITALS — WEIGHT: 201.94 LBS | HEIGHT: 67 IN | BODY MASS INDEX: 31.7 KG/M2

## 2025-03-13 DIAGNOSIS — D22.9 MULTIPLE BENIGN NEVI: ICD-10-CM

## 2025-03-13 DIAGNOSIS — L57.8 ACTINIC SKIN DAMAGE: ICD-10-CM

## 2025-03-13 DIAGNOSIS — Z08 ENCOUNTER FOR FOLLOW-UP SURVEILLANCE OF SKIN CANCER: ICD-10-CM

## 2025-03-13 DIAGNOSIS — Z86.006 HISTORY OF MELANOMA IN SITU: ICD-10-CM

## 2025-03-13 DIAGNOSIS — D18.01 CHERRY ANGIOMA: ICD-10-CM

## 2025-03-13 DIAGNOSIS — Z85.828 ENCOUNTER FOR FOLLOW-UP SURVEILLANCE OF SKIN CANCER: ICD-10-CM

## 2025-03-13 DIAGNOSIS — L57.0 ACTINIC KERATOSES: Primary | ICD-10-CM

## 2025-03-13 DIAGNOSIS — L82.1 SEBORRHEIC KERATOSES: ICD-10-CM

## 2025-03-13 NOTE — PROGRESS NOTES
Subjective:      Patient ID:  Mal Larkin is a 69 y.o. male who presents for   Chief Complaint   Patient presents with    Follow-up     Bx site      LOV 11/26/24 () SCC of scalp, neck    I recommend treatment by a Mohs surgeon who has the ability to ensure negative surgical margins before repairing the defect. Our [facility] Mohs surgeon Dr Junior is located in [place].Please notify patient and place referral request once patient has been given diagnosis and opportunity to discuss treatment plan  1. Skin,  Right posterior auricular scalp,  shave biopsy: - WELL-DIFFERENTIATED SQUAMOUS CELL CARCINOMA - The deep and peripheral tissue edges are uninvolved by tumor.    Patient here today for recheck of bx site, healed well no further concerns.   C/O spot to right temple, wont heal.     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 , MOHS   11/2024  SCC ([facility] type per picture) right forearm- 10/1/24 biopsied and excised by    BCC left temple- 7/2/2024 , monitoring  H/o AKs treated with cryo  H/o MIS upper back 2011  + FH melanoma in father and both sisters    Current Outpatient Medications:   ·  ascorbic acid, vitamin C, (VITAMIN C) 500 MG tablet, Take 500 mg by mouth once daily., Disp: , Rfl:   ·  calcium-vitamin D3 (OS-MAGGIE 500 + D3) 500 mg-5 mcg (200 unit) per tablet, Take 1 tablet by mouth Daily., Disp: , Rfl:   ·  COQ10, LIPOSOMAL UBIQUINOL, ORAL, Take 1 capsule by mouth once daily., Disp: , Rfl:   ·  cyanocobalamin (VITAMIN B-12) 1000 MCG tablet, Take 100 mcg by mouth once daily., Disp: , Rfl:   ·  glucosamine-chondroitin 500-400 mg tablet, Take 1 tablet by mouth 3 (three) times daily., Disp: , Rfl:   ·  KRILL OIL ORAL, Take 1 capsule by mouth once daily., Disp: , Rfl:   ·  lisinopriL (PRINIVIL,ZESTRIL) 40 MG tablet, Take 1 tablet (40 mg total) by mouth once daily., Disp: 90 tablet, Rfl: 3  ·  meloxicam (MOBIC) 7.5 MG tablet, Take 1 tablet (7.5 mg total) by  mouth once daily., Disp: 60 tablet, Rfl: 2  ·  multivitamin (THERAGRAN) per tablet, Take 1 tablet by mouth once daily., Disp: , Rfl:   ·  VITAMIN K2 ORAL, Take by mouth., Disp: , Rfl:               Review of Systems   Constitutional:  Negative for fever, chills, weight gain, fatigue, night sweats and malaise. Weight loss: bariatric surgery 5/2018 lost 135lbs.  HENT:  Negative for headaches.    Respiratory:  Negative for cough and shortness of breath.    Gastrointestinal:  Negative for indigestion.   Skin:  Positive for activity-related sunscreen use and wears hat. Negative for daily sunscreen use and recent sunburn.   Neurological:  Negative for headaches.   Hematologic/Lymphatic: Negative for adenopathy. Bruises/bleeds easily.       Objective:   Physical Exam   Constitutional: He appears well-developed and well-nourished. No distress.   Neurological: He is alert and oriented to person, place, and time. He is not disoriented.   Psychiatric: He has a normal mood and affect.   Skin:   Areas Examined (abnormalities noted in diagram):   Scalp / Hair Palpated and Inspected  Head / Face Inspection Performed  Neck Inspection Performed  Chest / Axilla Inspection Performed  Abdomen Inspection Performed  Back Inspection Performed  RUE Inspected  LUE Inspection Performed  Nails and Digits Inspection Performed                     Diagram Legend     Erythematous scaling macule/papule c/w actinic keratosis       Vascular papule c/w angioma      Pigmented verrucoid papule/plaque c/w seborrheic keratosis      Yellow umbilicated papule c/w sebaceous hyperplasia      Irregularly shaped tan macule c/w lentigo     1-2 mm smooth white papules consistent with Milia      Movable subcutaneous cyst with punctum c/w epidermal inclusion cyst      Subcutaneous movable cyst c/w pilar cyst      Firm pink to brown papule c/w dermatofibroma      Pedunculated fleshy papule(s) c/w skin tag(s)      Evenly pigmented macule c/w junctional nevus      Mildly variegated pigmented, slightly irregular-bordered macule c/w mildly atypical nevus      Flesh colored to evenly pigmented papule c/w intradermal nevus       Pink pearly papule/plaque c/w basal cell carcinoma      Erythematous hyperkeratotic cursted plaque c/w SCC      Surgical scar with no sign of skin cancer recurrence      Open and closed comedones      Inflammatory papules and pustules      Verrucoid papule consistent consistent with wart     Erythematous eczematous patches and plaques     Dystrophic onycholytic nail with subungual debris c/w onychomycosis     Umbilicated papule    Erythematous-base heme-crusted tan verrucoid plaque consistent with inflamed seborrheic keratosis     Erythematous Silvery Scaling Plaque c/w Psoriasis     See annotation      Assessment / Plan:        Actinic keratoses  Cryosurgery Procedure Note    Verbal consent from the patient is obtained and the patient is aware of the precancerous quality and need for treatment of these lesions. Liquid nitrogen cryosurgery is applied to the 3 actinic keratoses, as detailed in the physical exam, to produce a freeze injury. The patient is aware that blisters may form and is instructed on wound care with gentle cleansing and use of vaseline ointment to keep moist until healed. The patient is supplied a handout on cryosurgery and is instructed to call if lesions do not completely resolve.    Encounter for follow-up surveillance of skin cancer  History of melanoma in situ  Area of previous NMSCs (multiple)  and MIS examined. Site well healed with no signs of recurrence.  Upper body skin examination performed today including at least 9 points as noted in physical examination. No lesions suspicious for malignancy noted.    Seborrheic keratoses  These are benign inherited growths without a malignant potential. Reassurance given to patient. No treatment is necessary.     Multiple benign nevi  Careful dermoscopy evaluation of nevi performed with  none identified as needing biopsy today  Monitor for new mole or moles that are becoming bigger, darker, irritated, or developing irregular borders.     Cherry angioma  This is a benign vascular lesion. Reassurance given. No treatment required.     Actinic skin damage  Patient instructed in importance in daily broad spectrum sun protection of at least spf 30. Mineral sunscreen ingredients preferred (Zinc +/- Titanium) and can be found OTC.   Patient encouraged to wear hat for all outdoor exposure.   Also discussed sun avoidance and use of protective clothing.             Follow up in about 6 months (around 9/13/2025).

## 2025-07-21 ENCOUNTER — OFFICE VISIT (OUTPATIENT)
Dept: DERMATOLOGY | Facility: CLINIC | Age: 70
End: 2025-07-21
Payer: MEDICARE

## 2025-07-21 VITALS — WEIGHT: 201.94 LBS | BODY MASS INDEX: 31.63 KG/M2

## 2025-07-21 DIAGNOSIS — D48.5 NEOPLASM OF UNCERTAIN BEHAVIOR OF SKIN: Primary | ICD-10-CM

## 2025-07-21 PROCEDURE — 99499 UNLISTED E&M SERVICE: CPT | Mod: S$GLB,,, | Performed by: DERMATOLOGY

## 2025-07-21 PROCEDURE — 88305 TISSUE EXAM BY PATHOLOGIST: CPT | Mod: TC | Performed by: DERMATOLOGY

## 2025-07-21 PROCEDURE — 11102 TANGNTL BX SKIN SINGLE LES: CPT | Mod: S$GLB,,, | Performed by: DERMATOLOGY

## 2025-07-21 RX ORDER — AMLODIPINE BESYLATE 10 MG/1
10 TABLET ORAL DAILY
COMMUNITY
Start: 2025-04-30

## 2025-07-21 NOTE — PROGRESS NOTES
Subjective:      Patient ID:  Mal Larkin is a 69 y.o. male who presents for   Chief Complaint   Patient presents with    Spot     Left forearm x2 weeks     LOV 3/13/25 AK, Encounter for surveillance of skin cancer, hx of MM in [facility]    Patient here for spot on left forearm for 2 weeks.  He states it looks like his other skin cancers started.    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 , MOHS   11/2024  SCC (KA type per picture) right forearm- 10/1/24 biopsied and excised by    BCC left temple- 7/2/2024 , monitoring  H/o AKs treated with cryo  H/o MIS upper back 2011  + FH melanoma in father and both sisters    Current Outpatient Medications:   ·  amLODIPine (NORVASC) 10 MG tablet, Take 10 mg by mouth once daily., Disp: , Rfl:   ·  ascorbic acid, vitamin C, (VITAMIN C) 500 MG tablet, Take 500 mg by mouth once daily., Disp: , Rfl:   ·  calcium-vitamin D3 (OS-MAGGIE 500 + D3) 500 mg-5 mcg (200 unit) per tablet, Take 1 tablet by mouth Daily., Disp: , Rfl:   ·  COQ10, LIPOSOMAL UBIQUINOL, ORAL, Take 1 capsule by mouth once daily., Disp: , Rfl:   ·  cyanocobalamin (VITAMIN B-12) 1000 MCG tablet, Take 100 mcg by mouth once daily., Disp: , Rfl:   ·  glucosamine-chondroitin 500-400 mg tablet, Take 1 tablet by mouth 3 (three) times daily., Disp: , Rfl:   ·  KRILL OIL ORAL, Take 1 capsule by mouth once daily., Disp: , Rfl:   ·  lisinopriL (PRINIVIL,ZESTRIL) 40 MG tablet, Take 1 tablet (40 mg total) by mouth once daily., Disp: 90 tablet, Rfl: 3  ·  multivitamin (THERAGRAN) per tablet, Take 1 tablet by mouth once daily., Disp: , Rfl:   ·  VITAMIN K2 ORAL, Take by mouth., Disp: , Rfl:   ·  meloxicam (MOBIC) 7.5 MG tablet, Take 1 tablet (7.5 mg total) by mouth once daily., Disp: 60 tablet, Rfl: 2         Review of Systems   Constitutional:  Negative for fever, chills, weight gain, fatigue, night sweats and malaise. Weight loss: bariatric surgery 5/2018 lost 135lbs.  HENT:   Negative for headaches.    Respiratory:  Negative for cough and shortness of breath.    Gastrointestinal:  Negative for indigestion.   Skin:  Positive for activity-related sunscreen use and wears hat. Negative for daily sunscreen use and recent sunburn.   Neurological:  Negative for headaches.   Hematologic/Lymphatic: Negative for adenopathy. Bruises/bleeds easily.       Objective:   Physical Exam   Skin:               Diagram Legend     Erythematous scaling macule/papule c/w actinic keratosis       Vascular papule c/w angioma      Pigmented verrucoid papule/plaque c/w seborrheic keratosis      Yellow umbilicated papule c/w sebaceous hyperplasia      Irregularly shaped tan macule c/w lentigo     1-2 mm smooth white papules consistent with Milia      Movable subcutaneous cyst with punctum c/w epidermal inclusion cyst      Subcutaneous movable cyst c/w pilar cyst      Firm pink to brown papule c/w dermatofibroma      Pedunculated fleshy papule(s) c/w skin tag(s)      Evenly pigmented macule c/w junctional nevus     Mildly variegated pigmented, slightly irregular-bordered macule c/w mildly atypical nevus      Flesh colored to evenly pigmented papule c/w intradermal nevus       Pink pearly papule/plaque c/w basal cell carcinoma      Erythematous hyperkeratotic cursted plaque c/w SCC      Surgical scar with no sign of skin cancer recurrence      Open and closed comedones      Inflammatory papules and pustules      Verrucoid papule consistent consistent with wart     Erythematous eczematous patches and plaques     Dystrophic onycholytic nail with subungual debris c/w onychomycosis     Umbilicated papule    Erythematous-base heme-crusted tan verrucoid plaque consistent with inflamed seborrheic keratosis     Erythematous Silvery Scaling Plaque c/w Psoriasis     See annotation      Assessment / Plan:      Pathology Orders:       Normal Orders This Visit    Specimen to Pathology, Dermatology     Questions:    Procedure Type:  Dermatology and skin neoplasms    Number of Specimens: 1    ------------------------: -------------------------    Spec 1 Procedure: Excision    Spec 1 Clinical Impression: Purpuric patch with central hyperkeratotic papule, source of anxiety to patient with h/o multiple skin cancers, favor traumatic    Spec 1 Source: left forearm    Clinical Information: see EPIC    Clinical History: see EPIC    Specimen Source: Skin    Release to patient: Immediate    Send normal result to authorizing provider's In Basket if patient is active on MyChart: Yes          Neoplasm of uncertain behavior of skin  -     Specimen to Pathology, Dermatology    Shave biopsy procedure note:    Shave biopsy performed after verbal consent including risk of infection, scar, recurrence, need for additional treatment of site. Area prepped with alcohol, anesthetized with approximately 1.0cc of 1% lidocaine with epinephrine. Lesional tissue shaved with razor blade. Hemostasis achieved with application of aluminum chloride followed by hyfrecation. No complications. Dressing applied. Wound care explained.           No follow-ups on file.

## 2025-07-21 NOTE — PATIENT INSTRUCTIONS
Shave Biopsy Wound Care    Your doctor has performed a shave biopsy today.  A band aid and vaseline ointment has been placed over the site.  This should remain in place for 24 hours.  It is recommended that you keep the area dry for the first 24 hours.  After 24 hours, you may remove the band aid and wash the area with warm soap and water and apply Vaseline jelly.  Many patients prefer to use Neosporin or Bacitracin ointment.  This is acceptable; however, know that you can develop an allergy to this medication even if you have used it safely for years.  It is important to keep the area moist.  Letting it dry out and get air slows healing time, and will worsen the scar.  Band aid is optional after first 24 hours.      If you notice increasing redness, tenderness, pain, or yellow drainage at the biopsy site, please notify your doctor.  These are signs of an infection.    If your biopsy site is bleeding, apply firm pressure for 15 minutes straight.  Repeat for another 15 minutes, if it is still bleeding.   If the surgical site continues to bleed, then please contact your doctor.       [facility] - DERMATOLOGY  [address], [address]  [place] [address]  Dept: [phone]  Dept Fax: [phone]        
none

## 2025-07-23 LAB
ESTROGEN SERPL-MCNC: NORMAL PG/ML
INSULIN SERPL-ACNC: NORMAL U[IU]/ML
LAB AP CLINICAL INFORMATION: NORMAL
LAB AP GROSS DESCRIPTION: NORMAL
LAB AP REPORT FOOTNOTES: NORMAL
T3RU NFR SERPL: NORMAL %

## 2025-07-26 NOTE — TELEPHONE ENCOUNTER
----- Message from Charlie Mahoney sent at 6/28/2017  5:21 PM CDT -----  Contact: pt  Pt request call back has a schedule appointment 8/3/17 request earlier visit time having issues with his back added to the wait list ( did not feel the need to be seen today) insitied on message being sent [phone]  
Patient was called, booked with Dr Ferguson today 10:40.  
Vaccine status unknown

## 2025-08-14 ENCOUNTER — OFFICE VISIT (OUTPATIENT)
Dept: FAMILY MEDICINE | Facility: CLINIC | Age: 70
End: 2025-08-14
Payer: MEDICARE

## 2025-08-14 ENCOUNTER — LAB VISIT (OUTPATIENT)
Dept: LAB | Facility: HOSPITAL | Age: 70
End: 2025-08-14
Payer: MEDICARE

## 2025-08-14 VITALS
BODY MASS INDEX: 32.76 KG/M2 | TEMPERATURE: 98 F | HEART RATE: 69 BPM | DIASTOLIC BLOOD PRESSURE: 72 MMHG | SYSTOLIC BLOOD PRESSURE: 132 MMHG | OXYGEN SATURATION: 97 % | WEIGHT: 208.75 LBS | HEIGHT: 67 IN

## 2025-08-14 DIAGNOSIS — Z13.1 SCREENING FOR DIABETES MELLITUS: ICD-10-CM

## 2025-08-14 DIAGNOSIS — M79.89 LEG SWELLING: ICD-10-CM

## 2025-08-14 DIAGNOSIS — I10 PRIMARY HYPERTENSION: ICD-10-CM

## 2025-08-14 DIAGNOSIS — I10 PRIMARY HYPERTENSION: Primary | ICD-10-CM

## 2025-08-14 DIAGNOSIS — Z12.5 PROSTATE CANCER SCREENING: ICD-10-CM

## 2025-08-14 LAB
ALBUMIN SERPL BCP-MCNC: 4.3 G/DL (ref 3.5–5.2)
ALP SERPL-CCNC: 71 UNIT/L (ref 40–150)
ALT SERPL W/O P-5'-P-CCNC: 20 UNIT/L (ref 0–55)
ANION GAP (OHS): 10 MMOL/L (ref 8–16)
AST SERPL-CCNC: 24 UNIT/L (ref 0–50)
BILIRUB SERPL-MCNC: 0.3 MG/DL (ref 0.1–1)
BUN SERPL-MCNC: 15 MG/DL (ref 8–23)
CALCIUM SERPL-MCNC: 9.3 MG/DL (ref 8.7–10.5)
CHLORIDE SERPL-SCNC: 103 MMOL/L (ref 95–110)
CO2 SERPL-SCNC: 25 MMOL/L (ref 23–29)
CREAT SERPL-MCNC: 0.8 MG/DL (ref 0.5–1.4)
EAG (OHS): 105 MG/DL (ref 68–131)
GFR SERPLBLD CREATININE-BSD FMLA CKD-EPI: >60 ML/MIN/1.73/M2
GLUCOSE SERPL-MCNC: 85 MG/DL (ref 70–110)
HBA1C MFR BLD: 5.3 % (ref 4–5.6)
POTASSIUM SERPL-SCNC: 4.7 MMOL/L (ref 3.5–5.1)
PROT SERPL-MCNC: 6.9 GM/DL (ref 6–8.4)
PSA SERPL-MCNC: 0.72 NG/ML
SODIUM SERPL-SCNC: 138 MMOL/L (ref 136–145)

## 2025-08-14 PROCEDURE — 99999 PR PBB SHADOW E&M-EST. PATIENT-LVL IV: CPT | Mod: PBBFAC,,,

## 2025-08-14 PROCEDURE — 3075F SYST BP GE 130 - 139MM HG: CPT | Mod: CPTII,S$GLB,,

## 2025-08-14 PROCEDURE — 80053 COMPREHEN METABOLIC PANEL: CPT

## 2025-08-14 PROCEDURE — 1159F MED LIST DOCD IN RCRD: CPT | Mod: CPTII,S$GLB,,

## 2025-08-14 PROCEDURE — 84153 ASSAY OF PSA TOTAL: CPT

## 2025-08-14 PROCEDURE — 36415 COLL VENOUS BLD VENIPUNCTURE: CPT | Mod: PO

## 2025-08-14 PROCEDURE — 3288F FALL RISK ASSESSMENT DOCD: CPT | Mod: CPTII,S$GLB,,

## 2025-08-14 PROCEDURE — 3078F DIAST BP <80 MM HG: CPT | Mod: CPTII,S$GLB,,

## 2025-08-14 PROCEDURE — 4010F ACE/ARB THERAPY RXD/TAKEN: CPT | Mod: CPTII,S$GLB,,

## 2025-08-14 PROCEDURE — 83036 HEMOGLOBIN GLYCOSYLATED A1C: CPT

## 2025-08-14 PROCEDURE — 99214 OFFICE O/P EST MOD 30 MIN: CPT | Mod: S$GLB,,,

## 2025-08-14 PROCEDURE — 1101F PT FALLS ASSESS-DOCD LE1/YR: CPT | Mod: CPTII,S$GLB,,

## 2025-08-14 PROCEDURE — 1126F AMNT PAIN NOTED NONE PRSNT: CPT | Mod: CPTII,S$GLB,,

## 2025-08-14 PROCEDURE — 3008F BODY MASS INDEX DOCD: CPT | Mod: CPTII,S$GLB,,

## 2025-08-14 PROCEDURE — 3044F HG A1C LEVEL LT 7.0%: CPT | Mod: CPTII,S$GLB,,

## 2025-08-14 PROCEDURE — 1157F ADVNC CARE PLAN IN RCRD: CPT | Mod: CPTII,S$GLB,,

## 2025-08-14 PROCEDURE — 1160F RVW MEDS BY RX/DR IN RCRD: CPT | Mod: CPTII,S$GLB,,

## 2025-08-14 RX ORDER — AMLODIPINE BESYLATE 5 MG/1
5 TABLET ORAL DAILY
Qty: 30 TABLET | Refills: 3 | Status: SHIPPED | OUTPATIENT
Start: 2025-08-14

## 2025-08-14 RX ORDER — HYDROCHLOROTHIAZIDE 12.5 MG/1
12.5 TABLET ORAL DAILY
Qty: 30 TABLET | Refills: 11 | Status: SHIPPED | OUTPATIENT
Start: 2025-08-14 | End: 2026-08-14

## 2025-08-24 ENCOUNTER — PATIENT MESSAGE (OUTPATIENT)
Dept: ADMINISTRATIVE | Facility: OTHER | Age: 70
End: 2025-08-24
Payer: MEDICARE

## 2025-09-02 ENCOUNTER — LAB VISIT (OUTPATIENT)
Dept: LAB | Facility: HOSPITAL | Age: 70
End: 2025-09-02
Payer: MEDICARE

## 2025-09-02 DIAGNOSIS — I10 PRIMARY HYPERTENSION: ICD-10-CM

## 2025-09-02 LAB
ANION GAP (OHS): 8 MMOL/L (ref 8–16)
BUN SERPL-MCNC: 10 MG/DL (ref 8–23)
CALCIUM SERPL-MCNC: 9.9 MG/DL (ref 8.7–10.5)
CHLORIDE SERPL-SCNC: 101 MMOL/L (ref 95–110)
CO2 SERPL-SCNC: 28 MMOL/L (ref 23–29)
CREAT SERPL-MCNC: 0.7 MG/DL (ref 0.5–1.4)
GFR SERPLBLD CREATININE-BSD FMLA CKD-EPI: >60 ML/MIN/1.73/M2
GLUCOSE SERPL-MCNC: 97 MG/DL (ref 70–110)
POTASSIUM SERPL-SCNC: 4.9 MMOL/L (ref 3.5–5.1)
SODIUM SERPL-SCNC: 137 MMOL/L (ref 136–145)

## 2025-09-02 PROCEDURE — 82435 ASSAY OF BLOOD CHLORIDE: CPT

## 2025-09-02 PROCEDURE — 36415 COLL VENOUS BLD VENIPUNCTURE: CPT | Mod: PO

## (undated) DEVICE — DRAPE THREE-QTR REINF 53X77IN

## (undated) DEVICE — PENCIL ROCKER SWITCH 10FT CORD

## (undated) DEVICE — DRESSING XEROFORM FOIL PK 1X8

## (undated) DEVICE — TOURNIQUET SB QC DP 18X4IN

## (undated) DEVICE — SEE MEDLINE ITEM 146292

## (undated) DEVICE — TUBING SUC UNIV W/CONN 12FT

## (undated) DEVICE — ALCOHOL 70% ISOP RUBBING 4OZ

## (undated) DEVICE — SEE MEDLINE ITEM 152622

## (undated) DEVICE — BANDAGE ESMARK LATEX FREE 4INX

## (undated) DEVICE — STRAP OR TABLE 5IN X 72IN

## (undated) DEVICE — GOWN B1 X-LG X-LONG

## (undated) DEVICE — SEE L#120831

## (undated) DEVICE — PADDING CAST 4IN SPECIALIST

## (undated) DEVICE — Device

## (undated) DEVICE — NDL SPINAL SPINOCAN 22GX3.5

## (undated) DEVICE — BANDAGE ELAS SOFTWRAP ST 3X5YD

## (undated) DEVICE — DRAPE HAND STERILE

## (undated) DEVICE — FORCEP BAYO INSU SGL USE STRGT

## (undated) DEVICE — SYR 10CC LUER LOCK

## (undated) DEVICE — PACK BASIC

## (undated) DEVICE — PROBE 100MM PEDICLE SCREW

## (undated) DEVICE — BOWL STERILE LARGE 32OZ

## (undated) DEVICE — NDL SAFETY 25G X 1.5 ECLIPSE

## (undated) DEVICE — DRAPE C-ARMOR EQUIPMENT COVER

## (undated) DEVICE — BLADE SURG #15 CARBON STEEL

## (undated) DEVICE — SPONGE COTTON TRAY 4X4IN

## (undated) DEVICE — DRAPE STERI-DRAPE 1000 17X11IN

## (undated) DEVICE — KIT C.A.T.S. FAST START 4/CASE

## (undated) DEVICE — GLOVE SURG ULTRA TOUCH 8

## (undated) DEVICE — PAD CURAD NONADH 3X4IN

## (undated) DEVICE — APPLICATOR CHLORAPREP ORN 26ML

## (undated) DEVICE — ELECTRODE REM PLYHSV RETURN 9

## (undated) DEVICE — NDL BOX COUNTER

## (undated) DEVICE — COVER SURG LIGHT HANDLE

## (undated) DEVICE — DRAPE HALF SURGICAL 40X58IN

## (undated) DEVICE — SYS LABEL CORRECT MED

## (undated) DEVICE — STAPLER SKIN ROTATING HEAD

## (undated) DEVICE — TAPE MEDIPORE 3 X 10YD

## (undated) DEVICE — NDL HYPO 27G X 1 1/2

## (undated) DEVICE — UNDERGLOVES BIOGEL PI SIZE 8.5

## (undated) DEVICE — BANDAGE SOFFORM STER 2IN

## (undated) DEVICE — SPONGE DERMACEA 4X4IN 12PLY

## (undated) DEVICE — SOL 9P NACL IRR PIC IL

## (undated) DEVICE — GLOVE SURG ULTRA TOUCH 7.5

## (undated) DEVICE — SLEEVE SCD EXPRESS CALF MEDIUM

## (undated) DEVICE — PAD CAST SPECIALIST STRL 3

## (undated) DEVICE — DRAPE STERI INSTRUMENT 1018

## (undated) DEVICE — BLADE MILL COARSE DISPOSABLE

## (undated) DEVICE — UNDERGLOVES BIOGEL PI SIZE 8

## (undated) DEVICE — SEE MEDLINE ITEM 157117

## (undated) DEVICE — GAUZE FLUFF XXLG 36X36 2 PLY

## (undated) DEVICE — SYRINGE 0.9% NACL 10MIL PREFIL

## (undated) DEVICE — DRAPE U SPLIT SHEET 54X76IN

## (undated) DEVICE — SUT CTD VICRYL 2-0 UND BR O

## (undated) DEVICE — DRAPE INCISE IOBAN 2 23X17IN

## (undated) DEVICE — SUT ETHILON 4-0 PS2 18 BLK

## (undated) DEVICE — INSTRUMENT FRAZIER 10FR W/VENT

## (undated) DEVICE — GLOVE PROTEXIS LTX MICRO 8

## (undated) DEVICE — SEE MEDLINE ITEM 157131

## (undated) DEVICE — PACK CUSTOM LUMBAR LAM SLI

## (undated) DEVICE — BANDAGE COHES ASST CLR 2INX5YD

## (undated) DEVICE — TAPE MEDIPORE 4IN X 2YDS

## (undated) DEVICE — KIT SAHARA DRAPE DRAW/LIFT

## (undated) DEVICE — CORD BIPOLAR 12 FOOT

## (undated) DEVICE — DRESSING N ADH OIL EMUL 3X8 3S

## (undated) DEVICE — APPLICATOR CHLORAPREP CLR 10.5

## (undated) DEVICE — PAD ABD 8X10 STERILE

## (undated) DEVICE — DRAIN SINGLE ROUND 3/16 15F

## (undated) DEVICE — CONTAINER SPECIMEN STRL 4OZ

## (undated) DEVICE — NDL HYPODERMIC BLUNT 18G 1.5IN

## (undated) DEVICE — SUT 5-0 CHROMIC GUT / P-3

## (undated) DEVICE — SOL NS 1000CC

## (undated) DEVICE — DRAPE C-ARM FOR MOBILE XRAY

## (undated) DEVICE — SUT CTD VICRYL 1 UND OS-8

## (undated) DEVICE — GAUZE SPONGE BULKEE 6X6.75IN

## (undated) DEVICE — MARKERS SPHERZ PASSIVE

## (undated) DEVICE — FORCEP STRAIGHT DISP

## (undated) DEVICE — LINER SUCTION 3000CC

## (undated) DEVICE — GLOVE PROTEXIS LTX MICRO  7.5

## (undated) DEVICE — SPONGE LAP 4X18 PREWASHED

## (undated) DEVICE — SPONGE LAP 18X18 PREWASHED

## (undated) DEVICE — SEE MEDLINE ITEM 107746

## (undated) DEVICE — NDL SPINAL 18GX3.5 SPINOCAN

## (undated) DEVICE — SOL IRR NACL .9% 1000CC

## (undated) DEVICE — CANNULA CVD 100MM X 20G

## (undated) DEVICE — SHEET DRAPE MEDIUM

## (undated) DEVICE — DRESSING N ADH OIL EMUL 3X8

## (undated) DEVICE — KIT AUTO TRANSF 800ML RESVR

## (undated) DEVICE — BLADE SURG CARBON STEEL #10

## (undated) DEVICE — SEE MEDLINE ITEM 146409

## (undated) DEVICE — PAD ELECTROSURGICAL PAT PLATE

## (undated) DEVICE — SEE MEDLINE ITEM 153151